# Patient Record
Sex: FEMALE | Race: BLACK OR AFRICAN AMERICAN | Employment: UNEMPLOYED | URBAN - METROPOLITAN AREA
[De-identification: names, ages, dates, MRNs, and addresses within clinical notes are randomized per-mention and may not be internally consistent; named-entity substitution may affect disease eponyms.]

---

## 2017-01-01 ENCOUNTER — HOSPITAL ENCOUNTER (OUTPATIENT)
Dept: OTHER | Age: 62
Discharge: HOME | End: 2017-01-31
Attending: OTOLARYNGOLOGY | Admitting: OTOLARYNGOLOGY

## 2017-01-12 ENCOUNTER — HOSPITAL ENCOUNTER (OUTPATIENT)
Dept: PHYSICAL THERAPY | Age: 62
Discharge: HOME | End: 2017-01-12
Admitting: OTOLARYNGOLOGY

## 2017-01-30 ENCOUNTER — HOSPITAL ENCOUNTER (OUTPATIENT)
Dept: PHYSICAL THERAPY | Age: 62
Discharge: HOME | End: 2017-01-30
Admitting: OTOLARYNGOLOGY

## 2017-02-01 ENCOUNTER — HOSPITAL ENCOUNTER (OUTPATIENT)
Dept: OTHER | Age: 62
Discharge: HOME | End: 2017-02-28
Attending: OTOLARYNGOLOGY | Admitting: OTOLARYNGOLOGY

## 2017-03-01 ENCOUNTER — HOSPITAL ENCOUNTER (OUTPATIENT)
Dept: OTHER | Age: 62
Discharge: HOME | End: 2017-03-03
Attending: OTOLARYNGOLOGY | Admitting: OTOLARYNGOLOGY

## 2017-06-27 DIAGNOSIS — I25.10 CORONARY ARTERY DISEASE INVOLVING NATIVE CORONARY ARTERY OF NATIVE HEART WITHOUT ANGINA PECTORIS: ICD-10-CM

## 2017-07-07 ENCOUNTER — OFFICE VISIT (OUTPATIENT)
Dept: CARDIOLOGY CLINIC | Age: 62
End: 2017-07-07

## 2017-07-07 VITALS
BODY MASS INDEX: 31.01 KG/M2 | HEIGHT: 67 IN | HEART RATE: 60 BPM | WEIGHT: 197.6 LBS | SYSTOLIC BLOOD PRESSURE: 110 MMHG | DIASTOLIC BLOOD PRESSURE: 68 MMHG

## 2017-07-07 DIAGNOSIS — I20.9 ISCHEMIC CHEST PAIN (HCC): Primary | ICD-10-CM

## 2017-07-07 PROCEDURE — 99214 OFFICE O/P EST MOD 30 MIN: CPT | Performed by: INTERNAL MEDICINE

## 2017-07-07 RX ORDER — METOPROLOL SUCCINATE 25 MG/1
25 TABLET, EXTENDED RELEASE ORAL DAILY
COMMUNITY
End: 2019-02-11 | Stop reason: SDUPTHER

## 2017-07-07 RX ORDER — LISINOPRIL 20 MG/1
20 TABLET ORAL DAILY
COMMUNITY
End: 2017-11-10 | Stop reason: SDUPTHER

## 2017-07-07 RX ORDER — HYDROMORPHONE HYDROCHLORIDE 2 MG/1
2 TABLET ORAL EVERY 6 HOURS PRN
COMMUNITY
End: 2018-02-13 | Stop reason: ALTCHOICE

## 2017-07-10 ENCOUNTER — TELEPHONE (OUTPATIENT)
Dept: CARDIOLOGY CLINIC | Age: 62
End: 2017-07-10

## 2017-07-11 ENCOUNTER — TELEPHONE (OUTPATIENT)
Dept: CARDIOLOGY CLINIC | Age: 62
End: 2017-07-11

## 2017-07-17 ENCOUNTER — PROCEDURE VISIT (OUTPATIENT)
Dept: CARDIOLOGY CLINIC | Age: 62
End: 2017-07-17

## 2017-07-17 DIAGNOSIS — I20.9 ISCHEMIC CHEST PAIN (HCC): Primary | ICD-10-CM

## 2017-07-17 LAB
LV EF: 55 %
LVEF MODALITY: NORMAL

## 2017-07-17 PROCEDURE — 93306 TTE W/DOPPLER COMPLETE: CPT | Performed by: INTERNAL MEDICINE

## 2017-07-20 ENCOUNTER — TELEPHONE (OUTPATIENT)
Dept: CARDIOLOGY CLINIC | Age: 62
End: 2017-07-20

## 2017-07-25 ENCOUNTER — TELEPHONE (OUTPATIENT)
Dept: CARDIOLOGY CLINIC | Age: 62
End: 2017-07-25

## 2017-10-06 ENCOUNTER — HOSPITAL ENCOUNTER (OUTPATIENT)
Dept: PHYSICAL THERAPY | Age: 62
Discharge: HOME | End: 2017-10-31
Attending: GENERAL PRACTICE | Admitting: GENERAL PRACTICE

## 2017-10-06 ASSESSMENT — PAIN DESCRIPTION - ORIENTATION: ORIENTATION: MID

## 2017-10-06 ASSESSMENT — PAIN DESCRIPTION - PAIN TYPE: TYPE: SURGICAL PAIN;CHRONIC PAIN

## 2017-10-06 ASSESSMENT — PAIN DESCRIPTION - ONSET: ONSET: ON-GOING

## 2017-10-06 ASSESSMENT — PAIN SCALES - GENERAL: PAINLEVEL_OUTOF10: 1

## 2017-10-06 ASSESSMENT — PAIN DESCRIPTION - PROGRESSION: CLINICAL_PROGRESSION: GRADUALLY IMPROVING

## 2017-10-06 ASSESSMENT — PAIN DESCRIPTION - DESCRIPTORS: DESCRIPTORS: ACHING

## 2017-10-06 ASSESSMENT — PAIN DESCRIPTION - FREQUENCY: FREQUENCY: INTERMITTENT

## 2017-10-06 ASSESSMENT — PAIN DESCRIPTION - LOCATION: LOCATION: ABDOMEN

## 2017-10-06 NOTE — FLOWSHEET NOTE
Outpatient Physical Therapy           [place]           [x] Phone: [phone]   Fax: [phone]  Tanesha Saldana           [] Phone: [phone]   Fax: [phone]     To: Referring Practitioner: Lynn Ford    From: Cyrus Noble, PT     Patient: Pérez Coffey       : 1955  Diagnosis: Diagnosis: Weak abdominal muscles   Treatment Diagnosis: Treatment Diagnosis: Decreased core strength   Date: 10/6/2017    Physical Therapy Certification/Re-Certification Form  Dear Dr. Ariana Magana  The following patient has been evaluated for physical therapy services and for therapy to continue, insurance requires physician review of the treatment plan initially and every 90 days. Please review the attached evaluation and/or summary of the patient's plan of care, and verify that you agree therapy should continue by signing the attached document and sending it back to our office. Assessment: Hudson Madera is a 60 y.o. female reportignt to OP PT with c/c of abdominal weaknss and pain following a surgery in March. Pt is noted to have redcued LE and impared ability to active core muscles. Pt can beenfit from PT working on core strengtheing to help improve pain and functional mobilty. Patient agrees with established plan of care and assisted in the development of their short term and long term goals. Patient had no adverse reaction with initial treatment and there are no barriers to learning. Demonstrates no mental or cognitive disorder. Plan of Care/Treatment to date:  [x] Therapeutic Exercise  [] Modalities:  [x] Therapeutic Activity     [] Ultrasound  [] Electrical Stimulation  [] Gait Training      [] Cervical Traction [] Lumbar Traction  [x] Neuromuscular Re-education    [] Cold/hotpack [] Iontophoresis   [x] Instruction in HEP      [] Vasopneumatic     [x] Manual Therapy               [] Aquatic Therapy       ? Frequency/Duration:  # Days per week: [] 1 day # Weeks: [] 1 week [] 5 weeks     [x] 2 days?    [] 2 weeks [x] 6 weeks     [] 3 days   [] 3 weeks [] 7 weeks     [] 4 days   [] 4 weeks [] 8 weeks         [] 9 weeks [] 10 weeks         [] 11 weeks [] 12 weeks    Rehab Potential/Progress: [] Excellent [x] Good [] Fair  [] Poor     Goals:      Long term goals  Time Frame for Long term goals : 6 Weeks  Long term goal 1: Pt will improve Le strength to 4+/5 throughout  Long term goal 2: Pt will report 50% improvement with ability to perform housework. Long term goal 3: Pt will improve Oswestry score to < 20%    G-Code Selection: (On Eval and every 10th visit or Discharge)  MEASURE  [x] Mobility: Walking and Moving Around     [x] Current ()   [x] Goal ()   [] DC ()  [] Changing/Maintaining Body Position     [] Current (6309)      [] Goal ()   [] DC ()  [] Carrying / Moving / Handling Objects     [] Current ()   [] Goal ()   [] DC ()  [] Self-Care     [] Current ()   [] Goal ()   [] DC ()  [] Other PT/OT primary DX     [] Current ()   [] Goal ()   [] DC ()    SEVERITY  CURRENT  GOAL  DISCHARGE   [] CH (0% Impaired, Indep.)  [] CI (1-19% Impaired, SBA-CGA)  [x] CJ (20-39% Impaired, MIN A)  [] CK  (40-59% Impairment, Mod A)  [] CL  (60-79% Impairment, Max A)  [] CM  (80-99% Impairment, Dep.)   [] CN  (100% Impairment, Tot Dep.) [] CH (0% Impaired, Indep.)  [x] CI (1-19% Impaired, SBA-CGA)  [] CJ (20-39% Impaired, MIN A)  [] CK  (40-59% Impairment, Mod A)  [] CL  (60-79% Impairment, Max A)  [] CM  (80-99% Impairment, Dep.)   [] CN  (100% Impairment, Tot Dep.)  [] CH (0% Impaired, Indep.)  [] CI (1-19% Impaired, SBA-CGA)  [] CJ (20-39% Impaired, MIN A)  [] CK  (40-59% Impairment, Mod A)  [] CL  (60-79% Impairment, Max A)  [] CM  (80-99% Impairment, Dep.)   [] CN  (100% Impairment, Tot Dep.)          Electronically signed by:  George Canela, PT, 10/6/2017, 9:46 AM        If you have any questions or concerns, please don't hesitate to call.   Thank you for your referral.      Physician Signature:________________________________Date:_________ TIME: _____  By signing above, therapists plan is approved by physician

## 2017-10-06 NOTE — FLOWSHEET NOTE
Outpatient Physical Therapy           [place]           [] Phone: [phone]   Fax: [phone]  [place]           [] Phone: [phone]   Fax: [phone]    Physical Therapy Daily Treatment Note  Date:  10/6/2017    Patient Name:  Ariana Zaldivar    :  1955  MRN: [id number]  Restrictions/Precautions: none, some anxiety about getting sore  Diagnosis: Weak abdominal muscles  Date of Surgery: March  Treatment Diagnosis:  Decreased core strength    Insurance/Certification information:  [facility]  Referring Physician:   Jose Luis Shahid  Next Doctor Visit:    Plan of care signed (Y/N):  n  Visit# / total visits:     Pain level: 0/10   Goals:          Long term goals  Time Frame for Long term goals : 6 Weeks  Long term goal 1: Pt will improve Le strength to 4+/5 throughout  Long term goal 2: Pt will report 50% improvement with ability to perform housework. Long term goal 3: Pt will improve Oswestry score to < 20%         Subjective: Any changes in Ambulatory Summary Sheet? Objective:          Exercises:  Exercise/Equipment Date Date Date Date            Nu-step 8', Lv 5               TABLE         PPT   X20, 3\"      TA   X10, 3\"      Danika Crea, 3\"      PPT+kegals X10, 3\"      GS         Bridges         HL CS   x15 GTB      SLR                     STANDING                                                                          Other Therapeutic Activities/Education: Patient received education on their current pathology and how their condition effects them with their functional activities. Patient understood discussion and questions were answered. Patient understands their activity limitations and understands rational for treatment progression.     Home Exercise Program:  HL CS, abdominal draw in, PPT, kegals, Rotations    Modality/intervention used:    [x] Therapeutic Exercise  [] Modalities:  [x] Therapeutic Activity     [] Ultrasound  [] Elec  Stim  [] Gait Training      [] Cervical

## 2017-10-11 ENCOUNTER — HOSPITAL ENCOUNTER (OUTPATIENT)
Dept: PHYSICAL THERAPY | Age: 62
Discharge: HOME | End: 2017-10-11
Admitting: GENERAL PRACTICE

## 2017-10-17 ENCOUNTER — HOSPITAL ENCOUNTER (OUTPATIENT)
Dept: PHYSICAL THERAPY | Age: 62
Discharge: HOME | End: 2017-10-17
Admitting: GENERAL PRACTICE

## 2017-10-17 NOTE — FLOWSHEET NOTE
Outpatient Physical Therapy           [place]           [x] Phone: [phone]   Fax: [phone]  Vanessa Wallace           [] Phone: [phone]   Fax: [phone]    Physical Therapy Daily Treatment Note  Date:  10/17/2017    Patient Name:  Cherie Avila    :  1955  MRN: [id number]  Restrictions/Precautions: none, some anxiety about getting sore  Diagnosis: Weak abdominal muscles  Date of Surgery: March  Treatment Diagnosis:  Decreased core strength    [facility]  Referring Physician:   Seng Coleman  Next Doctor Visit:    Plan of care signed (Y/N):  n  Visit# / total visits:   3/12  Pain level: 3/10   Goals:       Long term goals  Time Frame for Long term goals : 6 Weeks  Long term goal 1: Pt will improve Le strength to 4+/5 throughout  Long term goal 2: Pt will report 50% improvement with ability to perform housework. NOT MET  Long term goal 3: Pt will improve Oswestry score to < 20%         Subjective:  Pt states HEP is painful, left side feel weaker.        Any changes in Ambulatory Summary Sheet? no      Objective:      End session pain    Exercises:  Exercise/Equipment 10/17/17 Date            Nu-step      Rec bike   5'     TABLE        PPT   X10, 3\"     TA   --     PPT+kegals X10, 3\"     Bridges  W/ abd TB x15 GTB     HL CS   X15, GTB     SLR     --     Supine physioball roll    x30     Lumbar roll w/ physioball x20 B     Seated russian twist x15 B, 1kg     Hand heel rock x10     STANDING        Step ups   X15 B, 4\"     Lateral step ups   x15 B, 4\"     GS   X15, 3\"     Pull downs   X20, BTT     Resisted rotations   x20 B GTT                       Other Therapeutic Activities/Education: none    Home Exercise Program:  HL CS, abdominal draw in, PPT, kegals, Rotations    Modality/intervention used:    [x] Therapeutic Exercise  [] Modalities:  [x] Therapeutic Activity     [] Ultrasound  [] Elec  Stim  [] Gait Training      [] Cervical Traction [] Lumbar Traction  [x] Neuromuscular Re-education    [] Cold/hotpack [] Iontophoresis   [x] Instruction in HEP      [] Vasopneumatic     [x] Manual Therapy               [] Aquatic Therapy     Manual Treatments:  none    Modalities:  none    Communication with other providers:  poc sent to referring provider    Education provided to patient/caregiver:  Pt educated on poc, hep, pathology, if worsening symptoms to d/c that exercise. Adverse reactions to treatment:  none    Equipment provided:  GTB    Assessment:  Pt did well with today's session. Progressed several exercises which pt rossana well. Added pull down and resisted rotations to HEP. Pt verbalized and demonstrated understanding.      Time In / Time Out:  1303/1352                  Timed Code/Total Treatment Minutes: 49/49; 49 TE (3)    Patients Report of Tolerance:    [] Patient limited by fatigue        [] Patient limited by pain   [] Patient limited by other medical complications   [x] Other: rossana tx well    Prognosis:   [x] Good [] Fair  [] Poor    Plan:   [x] Continue per plan of care [] Alter current plan (see comments)  [] Plan of care initiated [] Hold pending MD visit [] Discharge    Plan for Next Session:    Core strengthening low intensity      Electronically signed by:  Adelfo Brizuela, PT 10/17/2017, 7:09 AM

## 2017-10-20 ENCOUNTER — HOSPITAL ENCOUNTER (OUTPATIENT)
Dept: PHYSICAL THERAPY | Age: 62
Discharge: HOME | End: 2017-10-20
Admitting: GENERAL PRACTICE

## 2017-10-20 NOTE — FLOWSHEET NOTE
Outpatient Physical Therapy           [place]           [x] Phone: [phone]   Fax: [phone]  Jayla Ponces           [] Phone: [phone]   Fax: [phone]    Physical Therapy Daily Treatment Note  Date:  10/20/2017    Patient Name:  Nikolas Funez    :  1955  MRN: [id number]  Restrictions/Precautions: none, some anxiety about getting sore  Diagnosis: Weak abdominal muscles  Date of Surgery: March  Treatment Diagnosis:  Decreased core strength    Insurance/Certification information:  [facility]  Referring Physician:   Rachel Singh Doctor Visit:    Plan of care signed (Y/N):  n  Visit# / total visits:   3/12  Pain level: 3/10   Goals:       Long term goals  Time Frame for Long term goals : 6 Weeks  Long term goal 1: Pt will improve Le strength to 4+/5 throughout  Long term goal 2: Pt will report 50% improvement with ability to perform housework.  NOT MET  Long term goal 3: Pt will improve Oswestry score to < 20%         Subjective:  Pt states was really sore Tuesday and wednesday      Any changes in Ambulatory Summary Sheet? no      Objective:      End session pain 4/10    Exercises:  Exercise/Equipment 10/17/17 10/20/17            Nu-step  Lv5, 6'    Rec bike   5' --    TABLE        TA   -- --    PPT+kegals X10, 3\" X20, 3\" W/ OH next   Bridges  W/ abd TB x15 GTB x15 GTB    HL CS   X15, GTB x15 GTB    SLR     -- --    Supine physioball roll    x30 x30    Lumbar roll w/ physioball x20 B x20 B    Seated russian twist x15 B, 1kg 10x2 6#    Hand heel rock x10 x10    STANDING        Step ups   X15 B, 4\" x15 B, 4\"    Lateral step ups   x15 B, 4\" x15 B, 4\"    GS   X15, 3\" X20, 3\"    Pull downs   X20, BTT x20 BTT    Resisted rotations   x20 B GTT x20 B GTT    Diagonal pull down     next         BALANCE        Wobble board   next                 Other Therapeutic Activities/Education: none    Home Exercise Program:  HL CS, abdominal draw in, PPT, kegals, Rotations    Modality/intervention used: [x] Therapeutic Exercise  [] Modalities:  [x] Therapeutic Activity     [] Ultrasound  [] Elec  Stim  [] Gait Training      [] Cervical Traction [] Lumbar Traction  [x] Neuromuscular Re-education    [] Cold/hotpack [] Iontophoresis   [x] Instruction in HEP      [] Vasopneumatic     [x] Manual Therapy               [] Aquatic Therapy     Manual Treatments:  none    Modalities:  none    Communication with other providers:  None    Education provided to patient/caregiver:  Pt educated on poc, hep, pathology, if worsening symptoms to d/c that exercise. Adverse reactions to treatment:  none    Equipment provided:  GTB    Assessment: Pt rossana session well. Mild increase in end session pain. Did not progress any exercises today due to increase pain following last session. Will try and progress exercises next visit.      Time In / Time Out:   1255/1338               Timed Code/Total Treatment Minutes: 43/43; 43 TE (3)    Patients Report of Tolerance:    [] Patient limited by fatigue        [] Patient limited by pain   [] Patient limited by other medical complications   [x] Other: rossana tx well    Prognosis:   [x] Good [] Fair  [] Poor    Plan:   [x] Continue per plan of care [] Alter current plan (see comments)  [] Plan of care initiated [] Hold pending MD visit [] Discharge    Plan for Next Session:    Core strengthening low intensity      Electronically signed by:  Adrian Diop PT 10/20/2017, 6:51 AM

## 2017-10-27 ENCOUNTER — HOSPITAL ENCOUNTER (OUTPATIENT)
Dept: PHYSICAL THERAPY | Age: 62
Discharge: HOME | End: 2017-10-27
Admitting: GENERAL PRACTICE

## 2017-10-27 NOTE — FLOWSHEET NOTE
Outpatient Physical Therapy           [place]           [x] Phone: [phone]   Fax: [phone]  [place]           [] Phone: [phone]   Fax: [phone]    Physical Therapy Daily Treatment Note  Date:  10/27/2017    Patient Name:  Krysten Alfaro    :  1955  MRN: [id number]  Restrictions/Precautions: none, some anxiety about getting sore  Diagnosis: Weak abdominal muscles  Date of Surgery: March  Treatment Diagnosis:  Decreased core strength    Insurance/Certification information:  [facility]  Referring Physician:   Yohana Chicas  Next Doctor Visit:    Plan of care signed (Y/N):  y  Visit# / total visits:     Pain level: 2/10   Goals:       Long term goals  Time Frame for Long term goals : 6 Weeks  Long term goal 1: Pt will improve Le strength to 4+/5 throughout  Long term goal 2: Pt will report 50% improvement with ability to perform housework. NOT MET  Long term goal 3: Pt will improve Oswestry score to < 20%         Subjective:  Pt stated that her pain was about 2/10 today. Pt stated that she started taking 2 different meds and that she is feeling much better now. Any changes in Ambulatory Summary Sheet? Yes, taking Protonix and Zofram now. Pt is unsure of dosage and will let us know next visit.        Objective:  Needed cues to keep shoulders down during resisted rotation    End session pain 4/10    Exercises:  Exercise/Equipment 10/17/17 10/20/17 10/27/17           Nu-step  Lv5, 6' L-5 x 5'    Rec bike   5' --    TABLE        TA   -- --    PPT+kegals X10, 3\" X20, 3\" 1 KG ball x 15 w/ PPT/ kegals   Bridges  W/ abd TB x15 GTB x15 GTB GTB  X 15   HL CS   X15, GTB x15 GTB GTB x 20 B   SLR     -- --    Supine physioball roll    x30 x30 RSB x 20   Lumbar roll w/ physioball x20 B x20 B RSB 20x B   Seated russian twist x15 B, 1kg 10x2 6# 6# CB x 20 B   Hand heel rock x10 x10    STANDING        Step ups   X15 B, 4\" x15 B, 4\" 6\" x15 B   Lateral step ups   x15 B, 4\" x15 B, 4\" 6\" x 15 B   GS X15, 3\" X20, 3\" 20 x 3\"    Pull downs   X20, BTT x20 BTT BTT x 20   Resisted rotations   x20 B GTT x20 B GTT GTT x 20 B   Diagonal pull down     GTT x 15 B   (1 handle)         BALANCE        Wobble board   30\" x 2 ea dir                 Other Therapeutic Activities/Education: none    Home Exercise Program:  HL CS, abdominal draw in, PPT, kegals, Rotations    Modality/intervention used:    [x] Therapeutic Exercise  [] Modalities:  [x] Therapeutic Activity     [] Ultrasound  [] Elec  Stim  [] Gait Training      [] Cervical Traction [] Lumbar Traction  [x] Neuromuscular Re-education    [] Cold/hotpack [] Iontophoresis   [x] Instruction in HEP      [] Vasopneumatic     [x] Manual Therapy               [] Aquatic Therapy     Manual Treatments:  none    Modalities:  none    Communication with other providers:  None    Education provided to patient/caregiver:  Pt educated on poc, hep, pathology, if worsening symptoms to d/c that exercise. Adverse reactions to treatment:  Some nausea during RSB rolls    Equipment provided:      Assessment: Pt tolerated treatment fairly well. Pt was able to advance activity in gym today. Pt rated pain at 4/10 after treatment.      Time In / Time Out:  1009/   1052            Timed Code/Total Treatment Minutes:  43'/43'  TE (37')     Patients Report of Tolerance:    [] Patient limited by fatigue        [] Patient limited by pain   [] Patient limited by other medical complications   [x] Other: rossana tx well    Prognosis:   [x] Good [] Fair  [] Poor    Plan:   [x] Continue per plan of care [] Alter current plan (see comments)  [] Plan of care initiated [] Hold pending MD visit [] Discharge    Plan for Next Session:    Core strengthening low intensity      Electronically signed by:  Aman Jarrell PTA 10/27/2017, 9:45 AM     10/27/2017,4:46 PM

## 2017-10-31 ENCOUNTER — HOSPITAL ENCOUNTER (OUTPATIENT)
Dept: PHYSICAL THERAPY | Age: 62
Discharge: HOME | End: 2017-10-31
Admitting: GENERAL PRACTICE

## 2017-10-31 NOTE — FLOWSHEET NOTE
Outpatient Physical Therapy           [place]           [x] Phone: [phone]   Fax: [phone]  Cedric Beltran           [] Phone: [phone]   Fax: [phone]    Physical Therapy Daily Treatment Note  Date:  10/31/2017    Patient Name:  Emili Arrieta    :  [birth year]  MRN: [id number]  Restrictions/Precautions: none, some anxiety about getting sore  Diagnosis: Weak abdominal muscles  Date of Surgery: 2017  Treatment Diagnosis:  Decreased core strength    [facility]Cert[facility]  Referring Physician:   Dora Costa  Next Doctor Visit:  January  Plan of care signed (Y/N):  y  Visit# / total visits:     Pain level: 3-410   Goals:       Long term goals  Time Frame for Long term goals : 6 Weeks  Long term goal 1: Pt will improve Le strength to 4+/5 throughout NOT MET  Long term goal 2: Pt will report 50% improvement with ability to perform housework. NOT MET  Long term goal 3: Pt will improve Oswestry score to < 20%         Subjective:  Pt states still having abdominal pain Still feel like left leg is really weak. Any changes in Ambulatory Summary Sheet? Yes, taking Protonix and Zofram now. Pt is unsure of dosage and will let us know next visit.        Objective:   Some shaking  left quad with squatting activities   End session pain 3-4/10    Exercises:  Exercise/Equipment 10/27/17 10/31/17            Nu-step L-5 x 5'  6', Lv 6    Rec bike    --    TABLE        TA    -- Have pt shcedule 2x w/k   PPT+kegals 1 KG ball x 15 w/ PPT/ kegals --    Bridges  W/ abd TB GTB  X 15 x15 GTB    SL CS   GTB x 20 B x10 B GTB    SLR      --    Supine physioball roll    RSB x 20 x15    Lumbar roll w/ physioball RSB 20x B x10 B    Seated russian twist 6# CB x 20 B x20 B, 6#    Hand heel rock  --    STANDING        Step ups   6\" x15 B --    Lateral step ups   6\" x 15 B --    GS   20 x 3\"  --    Pull downs   BTT x 20 x20 BTT    Diagonal pull down    x15 B BTT    Resisted rotations  FT GTT x 20 B X20, 1 plt    Resisted side steps FT  x7 B, 1 plt    Shuttle Squats  DL  SL    10x2, 3C  5x3, L    HS curl machine  x15 B, 15#    Squat with ball push out  10x2, 2 kg    BALANCE        Wobble board 30\" x 2 ea dir --                  Other Therapeutic Activities/Education: none    Home Exercise Program:  SL CS, abdominal draw in, PPT, kegals, Rotations    Modality/intervention used:    [x] Therapeutic Exercise  [] Modalities:  [x] Therapeutic Activity     [] Ultrasound  [] Elec  Stim  [] Gait Training      [] Cervical Traction [] Lumbar Traction  [x] Neuromuscular Re-education    [] Cold/hotpack [] Iontophoresis   [x] Instruction in HEP      [] Vasopneumatic     [x] Manual Therapy               [] Aquatic Therapy     Manual Treatments:  none    Modalities:  none    Communication with other providers:  None    Education provided to patient/caregiver:  Pt educated on poc, hep, pathology, if worsening symptoms to d/c that exercise. Adverse reactions to treatment:  Some nausea during RSB rolls    Equipment provided:      Assessment: Pt did well with emily's session. Increased resistance exercises for lower extremities as pt still feels weaker on left side. Shaking due to weakness and decreased motor control. Will continue to benefit working on progressing on core strength and LE strengthening.      Time In / Time Out:       1100/1157      Timed Code/Total Treatment Minutes:  37/10; 57 TE (4)    Patients Report of Tolerance:    [] Patient limited by fatigue        [] Patient limited by pain   [] Patient limited by other medical complications   [x] Other: rossana tx well    Prognosis:   [x] Good [] Fair  [] Poor    Plan:   [x] Continue per plan of care [] Alter current plan (see comments)  [] Plan of care initiated [] Hold pending MD visit [] Discharge    Plan for Next Session:    Core and LE strengthening low intensity      Electronically signed by:  Teofilo Acevedo PT 10/31/2017, 7:20 AM

## 2017-11-01 ENCOUNTER — HOSPITAL ENCOUNTER (OUTPATIENT)
Dept: OTHER | Age: 62
Discharge: HOME | End: 2017-11-30
Attending: GENERAL PRACTICE | Admitting: GENERAL PRACTICE

## 2017-11-08 ENCOUNTER — HOSPITAL ENCOUNTER (OUTPATIENT)
Dept: PHYSICAL THERAPY | Age: 62
Discharge: HOME | End: 2017-11-08
Admitting: GENERAL PRACTICE

## 2017-11-08 NOTE — FLOWSHEET NOTE
Outpatient Physical Therapy           [place]           [x] Phone: [phone]   Fax: [phone]  [place]           [] Phone: [phone]   Fax: [phone]    Physical Therapy Daily Treatment Note  Date:  2017    Patient Name:  Holly Velasquez    :    MRN: [id number]  Restrictions/Precautions: none, some anxiety about getting sore  Diagnosis: Weak abdominal muscles  Date of Surgery: 2017  Treatment Diagnosis:  Decreased core strength    Insurance/Certification information:  [facility]  Referring Physician:   Asif James  Next Doctor Visit:  January  Plan of care signed (Y/N):  y  Visit# / total visits:     Pain level: 0/10   Goals:       Long term goals  Time Frame for Long term goals : 6 Weeks  Long term goal 1: Pt will improve Le strength to 4+/5 throughout NOT MET  Long term goal 2: Pt will report 50% improvement with ability to perform housework. NOT MET  Long term goal 3: Pt will improve Oswestry score to < 20%         Subjective:   Pt stated that her pain was 0/10. Pt stated that stated that she has been sleeping the last few days because she caught a stomach bug. Pt stated that she feels weak, though. Pt stated that she has not done any of her exercises since last Friday.      Any changes in Ambulatory Summary Sheet? no    Objective: LOB w/ step ups leading with L LE, but able to recover I.   End session pain   5/10    Exercises:  Exercise/Equipment 10/27/17 10/31/17 11/3/17 11/8/17            Nu-step L-5 x 5'  6', Lv 6 L-6 x 5'  X 5'   Rec bike    --     TABLE         TA    --     PPT+kegals 1 KG ball x 15 w/ PPT/ kegals --     Bridges  W/ abd TB GTB  X 15 x15 GTB GTB 10x2 GTB 10x2   SL CS   GTB x 20 B x10 B GTB GTB 15 B GTB x 15 ea    SLR      --     Supine physioball roll    RSB x 20 x15  GSB X 15 GSB x 15   Lumbar roll w/ physioball RSB 20x B x10 B X 10 B  GSB 10x2 ea side   Seated russian twist 6# CB x 20 B x20 B, 6# 6# CB x 20 B 8# x 20   Hand heel rock  -- STANDING         Step ups   6\" x15 B -- 6\" x 15 B 6\" 15 B   Lateral step ups   6\" x 15 B -- 6\"  X 15 B 6\"  X 15 B   GS   20 x 3\"  --     Pull downs   BTT x 20 x20 BTT BTT x 20 BTT  X 20   Diagonal pull down    x15 B BTT BTT 20 x ea side --   Resisted rotations  FT GTT x 20 B X20, 1 plt 1 plt x 20 ea side --   Resisted side steps FT  x7 B, 1 plt 2 plts( belt) x 10 ea side --   Shuttle Squats  DL  SL    10x2, 3C  5x3, L   3C 10x2  2C 5x3 L only   3C 10x2  2C L/R x 15   HS curl machine  x15 B, 15# 15# U x 15 ea LE 15# U x 15 R  10# U x 15 L   Squat with ball push out  10x2, 2 kg 2 kg 10x2 --   BALANCE         Wobble board 30\" x 2 ea dir --                     Other Therapeutic Activities/Education: none    Home Exercise Program:  SL CS, abdominal draw in, PPT, kegals, Rotations    Modality/intervention used:    [x] Therapeutic Exercise  [] Modalities:  [x] Therapeutic Activity     [] Ultrasound  [] Elec  Stim  [] Gait Training      [] Cervical Traction [] Lumbar Traction  [x] Neuromuscular Re-education    [] Cold/hotpack [] Iontophoresis   [x] Instruction in HEP      [] Vasopneumatic     [x] Manual Therapy               [] Aquatic Therapy     Manual Treatments:  none    Modalities:  none    Communication with other providers:  None    Education provided to patient/caregiver:  Pt educated on poc, hep, pathology, if worsening symptoms to d/c that exercise. Adverse reactions to treatment:     Equipment provided:      Assessment: Pt tolerated treatment fair today. Treatment was modified due to patient being sick earlier in the week and also because pt is scheduled to return to therapy in the morning. Pt rated pain at 5/10 after treatment. Pt's reported that her pain steadily increased after each exercise today. Pt will continue to benefit from more core strengthening and LE especially L strengthening.      Time In / Time Out:      1030/1115    Timed Code/Total Treatment Minutes: 45'/45' 3 TE (39')     Patients Report

## 2017-11-09 ENCOUNTER — HOSPITAL ENCOUNTER (OUTPATIENT)
Dept: PHYSICAL THERAPY | Age: 62
Discharge: HOME | End: 2017-11-09
Admitting: GENERAL PRACTICE

## 2017-11-09 NOTE — FLOWSHEET NOTE
downs   x20 BTT BTT x 20 BTT  X 20 BTT x 20   Diagonal pull down   x15 B BTT BTT 20 x ea side -- BTT 20 x ea side   Resisted rotations  FT X20, 1 plt 1 plt x 20 ea side -- 2 plts x 20 ea side   Resisted side steps FT x7 B, 1 plt 2 plts( belt) x 10 ea side -- 2 plts( belt) x 10 ea side   Shuttle Squats  DL  SL   10x2, 3C  5x3, L   3C 10x2  2C 5x3 L only   3C 10x2  2C L/R x 15    HS curl machine x15 B, 15# 15# U x 15 ea LE 15# U x 15 R  10# U x 15 L 15# U x 15 R  10# U x 15 L   Squat with ball push out 10x2, 2 kg 2 kg 10x2 -- 2KG 10x2   BALANCE         Wobble board --                      Other Therapeutic Activities/Education: none    Home Exercise Program:  SL CS, abdominal draw in, PPT, kegals, Rotations    Modality/intervention used:    [x] Therapeutic Exercise  [] Modalities:  [x] Therapeutic Activity     [] Ultrasound  [] Elec  Stim  [] Gait Training      [] Cervical Traction [] Lumbar Traction  [x] Neuromuscular Re-education    [] Cold/hotpack [] Iontophoresis   [x] Instruction in HEP      [] Vasopneumatic     [x] Manual Therapy               [] Aquatic Therapy     Manual Treatments:  none    Modalities:  none    Communication with other providers:  None    Education provided to patient/caregiver:  Pt educated on poc, hep, pathology, if worsening symptoms to d/c that exercise. Adverse reactions to treatment:     Equipment provided:      Assessment: Pt tolerated treatment fair today. Treatment was modified due to patient having two appointments on consecutive days. Pt rated pain at 6/10 after treatment. Pt's reported that her pain steadily increased after each exercise today. Pt will continue to benefit from more core strengthening and LE especially L strengthening.      Time In / Time Out:   1026/1114      Timed Code/Total Treatment Minutes: 48'/48' 2 TE (45')1 ( 13')      Patients Report of Tolerance:    [] Patient limited by fatigue        [] Patient limited by pain   [] Patient limited by other medical

## 2017-11-10 RX ORDER — LISINOPRIL 20 MG/1
20 TABLET ORAL DAILY
Qty: 30 TABLET | Refills: 6 | Status: SHIPPED | OUTPATIENT
Start: 2017-11-10 | End: 2019-06-28 | Stop reason: SDUPTHER

## 2017-11-10 RX ORDER — SIMVASTATIN 20 MG
20 TABLET ORAL NIGHTLY
Qty: 30 TABLET | Refills: 6 | Status: SHIPPED | OUTPATIENT
Start: 2017-11-10 | End: 2021-02-22 | Stop reason: SDUPTHER

## 2017-12-01 ENCOUNTER — HOSPITAL ENCOUNTER (OUTPATIENT)
Dept: OTHER | Age: 62
Discharge: HOME | End: 2017-12-31
Attending: GENERAL PRACTICE | Admitting: GENERAL PRACTICE

## 2018-01-01 ENCOUNTER — HOSPITAL ENCOUNTER (OUTPATIENT)
Dept: OTHER | Age: 63
Discharge: HOME | End: 2018-01-22
Attending: GENERAL PRACTICE | Admitting: GENERAL PRACTICE

## 2018-03-19 ENCOUNTER — OFFICE VISIT (OUTPATIENT)
Dept: CARDIOLOGY CLINIC | Age: 63
End: 2018-03-19

## 2018-03-19 VITALS
SYSTOLIC BLOOD PRESSURE: 132 MMHG | HEART RATE: 70 BPM | HEIGHT: 67 IN | WEIGHT: 186.2 LBS | BODY MASS INDEX: 29.22 KG/M2 | DIASTOLIC BLOOD PRESSURE: 62 MMHG

## 2018-03-19 DIAGNOSIS — R07.9 CHEST PAIN, UNSPECIFIED TYPE: ICD-10-CM

## 2018-03-19 DIAGNOSIS — I25.10 CORONARY ARTERY DISEASE INVOLVING NATIVE CORONARY ARTERY OF NATIVE HEART WITHOUT ANGINA PECTORIS: Primary | ICD-10-CM

## 2018-03-19 PROCEDURE — G8419 CALC BMI OUT NRM PARAM NOF/U: HCPCS | Performed by: INTERNAL MEDICINE

## 2018-03-19 PROCEDURE — 3017F COLORECTAL CA SCREEN DOC REV: CPT | Performed by: INTERNAL MEDICINE

## 2018-03-19 PROCEDURE — 3014F SCREEN MAMMO DOC REV: CPT | Performed by: INTERNAL MEDICINE

## 2018-03-19 PROCEDURE — 93000 ELECTROCARDIOGRAM COMPLETE: CPT | Performed by: INTERNAL MEDICINE

## 2018-03-19 PROCEDURE — G8427 DOCREV CUR MEDS BY ELIG CLIN: HCPCS | Performed by: INTERNAL MEDICINE

## 2018-03-19 PROCEDURE — G8599 NO ASA/ANTIPLAT THER USE RNG: HCPCS | Performed by: INTERNAL MEDICINE

## 2018-03-19 PROCEDURE — 4004F PT TOBACCO SCREEN RCVD TLK: CPT | Performed by: INTERNAL MEDICINE

## 2018-03-19 PROCEDURE — G8484 FLU IMMUNIZE NO ADMIN: HCPCS | Performed by: INTERNAL MEDICINE

## 2018-03-19 PROCEDURE — 99214 OFFICE O/P EST MOD 30 MIN: CPT | Performed by: INTERNAL MEDICINE

## 2018-03-19 RX ORDER — TRAMADOL HYDROCHLORIDE 50 MG/1
50 TABLET ORAL EVERY 6 HOURS PRN
COMMUNITY
End: 2019-06-28

## 2018-03-19 NOTE — PROGRESS NOTES
metoprolol succinate (TOPROL XL) 25 MG extended release tablet Take 25 mg by mouth daily      senna-docusate (SENOKOT S) 8.6-50 MG per tablet Take 2 tablets by mouth daily 30 tablet 2    Acetaminophen 650 MG TABS Take 650 mg by mouth every 4 hours as needed 120 tablet 3    dicyclomine (BENTYL) 20 MG tablet Take 20 mg by mouth every 6 hours       ranitidine (ZANTAC) 300 MG tablet 300 mg daily as needed   5    nitroGLYCERIN (NITROSTAT) 0.4 MG SL tablet Place 1 tablet under the tongue every 5 minutes as needed for Chest pain 25 tablet 3    loratadine (CLARITIN) 10 MG tablet Take 10 mg by mouth daily.  aspirin EC 81 MG EC tablet Take 1 tablet by mouth daily. 30 tablet 3    triamterene-hydrochlorothiazide (MAXZIDE) 75-50 MG per tablet Take 1 tablet by mouth as needed       clonazePAM (KLONOPIN) 1 MG tablet Take 1 mg by mouth 3 times daily as needed. No current facility-administered medications for this visit. Allergies: Pregabalin; Hydromorphone hcl; Codeine; Aspirin; Gabapentin; Ibuprofen; and Iodine  Past Medical History:   Diagnosis Date    Anxiety     CAD (coronary artery disease)     Depression     GERD (gastroesophageal reflux disease)     H/O cardiovascular stress test     7/26/12-No scintigraphic evidence of inducible myocardial ischemia. Global Left ventricular sytolic function normal. Rest EF 64%. No ECG changes. Unremarkable pharmacological stress test. Normal Myocardial Perfusion Study. 3/30/11- EF70% Normal Myocardial Perfusion study. 2/18/2010 EF =>55%;1/2009 EF 65%, 1/2008, 1/2007, 5/2006, 2/2006 EF70%    H/O Doppler ultrasound 3/3/2008     Carotid Report- No Significant atherosclerotic plaque noted in the right internal carotid artery. The Doppler flow velocities w/in FREDERIC are within normal limits. There is intimal thinckening but no significant atherosclerotic plaque noted in LICA. The Dopple flow velocities w/in LICA are Within Normal Limits.     H/O echocardiogram 7/26/12- EF=>55%. Left ventricular systolic function is [id number] EF =>55%, 12/2010 EF55%;  2/14/2008    H/O echocardiogram 07/17/2017    EF >55%    H/O percutaneous left heart catheterization 3/11/14    EF 55% Left anterior descending artery has patent stent, proximal LAD has eccentric lesion of 30-40% unchanged from last time, The right coronary artery is a dominant vessel with abberant take off, has 40-50% mid RCA stenosis, proximal stent is patent. No evidence of hemodynamically significant coronary artery disease, I have tried to do FFR of RCA, however, because of  aberrant take off, could no    Hx of cardiovascular stress test 5/14/2015    cardiolite-normal,EF63%    Hx of echocardiogram 3/15/16    EF 55%, normal LV, trivial MR & TR, mild aortic insufficiency.  Hyperlipidemia     Orthostatic hypotension     Other activity(E029.9) 9/2/2008    48 HR Holter Monitor- Predominat rhythm is sinus rhythm. No significant ectopy noted.  Post PTCA 08/23/2013    stent LAD    Post PTCA 08/13/2012    RCA 99% reduced to 0 w/ PTCA and stent w. 2.75 X 23 Xience. Left main coronary artery has no signif. dis. LAD artery has mild disease. CX artery has no signif. disease. EF 55%     Past Surgical History:   Procedure Laterality Date    CARDIAC CATHETERIZATION      4/24/2006-Left heart cath-    CARDIAC CATHETERIZATION  03/11/2014    EF 55% Left anterior descending artery has patent stent, proximal LAD has eccentric lesion of 30-40% unchanged from last time, The right coronary artery is a dominant vessel with abberant take off, has 40-50% mid RCA stenosis, proximal stent is patent.  No evidence of hemodynamically significant coronary artery disease, I have tried to do FFR of RCA, however, because of  aberrant take off, could no    CORONARY ANGIOPLASTY WITH STENT PLACEMENT  8/23/13 8/23/13 LAD 7/2012; 4/25/2006- PTCA w/ stent-> RCA    EYE SURGERY      HERNIA REPAIR  03/08/2017    HYSTERECTOMY      TONSILLECTOMY AND ADENOIDECTOMY      ULNAR TUNNEL RELEASE  11/2006    Ulnar nerve tranfer Left elbow    WRIST SURGERY  7/2008    Left wrist     Family History   Problem Relation Age of Onset    Diabetes Sister     Heart Disease Sister     High Blood Pressure Sister     Diabetes Brother     Heart Disease Brother     High Blood Pressure Brother     Cancer Maternal Aunt     Cancer Paternal Aunt     Diabetes Brother     High Blood Pressure Brother      Social History   Substance Use Topics    Smoking status: Current Some Day Smoker     Packs/day: 0.25     Types: Cigarettes     Start date: 6/1/2015    Smokeless tobacco: Never Used    Alcohol use No      [unfilled]  Review of Systems:   · Constitutional: No Fever or Weight Loss   · Eyes: No Decreased Vision  · ENT: No Headaches, Hearing Loss or Vertigo  · Cardiovascular: No chest pain, dyspnea on exertion, palpitations or loss of consciousness  · Respiratory: No cough or wheezing    · Gastrointestinal: No abdominal pain, appetite loss, blood in stools, constipation, diarrhea or heartburn  · Genitourinary: No dysuria, trouble voiding, or hematuria  · Musculoskeletal:  No gait disturbance, weakness or joint complaints  · Integumentary: No rash or pruritis  · Neurological: No TIA or stroke symptoms  · Psychiatric: No anxiety or depression  · Endocrine: No malaise, fatigue or temperature intolerance  · Hematologic/Lymphatic: No bleeding problems, blood clots or swollen lymph nodes  · Allergic/Immunologic: No nasal congestion or hives  All systems negative except as marked. Objective:  /62   Pulse 70   Ht 5' 7\" (1.702 m)   Wt 186 lb 3.2 oz (84.5 kg)   BMI 29.16 kg/m²   Wt Readings from Last 3 Encounters:   03/19/18 186 lb 3.2 oz (84.5 kg)   03/13/18 188 lb 3.2 oz (85.4 kg)   02/13/18 191 lb (86.6 kg)     Body mass index is 29.16 kg/m².   GENERAL - Alert, oriented, pleasant, in no apparent distress,normal grooming  HEENT  pupils are reactive to light has a past medical history of Anxiety; CAD (coronary artery disease); Depression; GERD (gastroesophageal reflux disease); H/O cardiovascular stress test; H/O Doppler ultrasound; H/O echocardiogram; H/O echocardiogram; H/O percutaneous left heart catheterization; Hx of cardiovascular stress test; Hx of echocardiogram; Hyperlipidemia; Orthostatic hypotension; Other activity(E029.9); Post PTCA; and Post PTCA. and presents with     Plan:  1. CAD: Continue aspirin and change brilinita to 60 mg po bid,continue lopressor and zocor. 2. Vague chest discomfort: stress test ordered  3. Dyslipidemia: continue statins  4. HTN: stable, continue lopressor and lisinopril medicatons  1. Health maintenance: exerise and dietHealth maintenance: exerise and diet  All labs, medications and tests reviewed, continue all other medications of all above medical condition listed as is.     [unfilled]

## 2018-04-02 ENCOUNTER — TELEPHONE (OUTPATIENT)
Dept: CARDIOLOGY CLINIC | Age: 63
End: 2018-04-02

## 2018-04-06 ENCOUNTER — TELEPHONE (OUTPATIENT)
Dept: CARDIOLOGY CLINIC | Age: 63
End: 2018-04-06

## 2018-06-07 ENCOUNTER — TELEPHONE (OUTPATIENT)
Dept: CARDIOLOGY CLINIC | Age: 63
End: 2018-06-07

## 2018-06-21 DIAGNOSIS — R07.9 CHEST PAIN, UNSPECIFIED TYPE: Primary | ICD-10-CM

## 2018-06-22 ENCOUNTER — PROCEDURE VISIT (OUTPATIENT)
Dept: CARDIOLOGY CLINIC | Age: 63
End: 2018-06-22

## 2018-06-22 DIAGNOSIS — R07.9 CHEST PAIN, UNSPECIFIED TYPE: Primary | ICD-10-CM

## 2018-06-22 PROCEDURE — 93015 CV STRESS TEST SUPVJ I&R: CPT | Performed by: INTERNAL MEDICINE

## 2018-07-09 ENCOUNTER — TELEPHONE (OUTPATIENT)
Dept: CARDIOLOGY CLINIC | Age: 63
End: 2018-07-09

## 2018-08-06 ENCOUNTER — OFFICE VISIT (OUTPATIENT)
Dept: CARDIOLOGY CLINIC | Age: 63
End: 2018-08-06

## 2018-08-06 VITALS
DIASTOLIC BLOOD PRESSURE: 64 MMHG | BODY MASS INDEX: 29.7 KG/M2 | WEIGHT: 189.2 LBS | SYSTOLIC BLOOD PRESSURE: 136 MMHG | HEIGHT: 67 IN | HEART RATE: 60 BPM

## 2018-08-06 DIAGNOSIS — I10 ESSENTIAL HYPERTENSION: Primary | ICD-10-CM

## 2018-08-06 PROCEDURE — G8599 NO ASA/ANTIPLAT THER USE RNG: HCPCS | Performed by: INTERNAL MEDICINE

## 2018-08-06 PROCEDURE — G8419 CALC BMI OUT NRM PARAM NOF/U: HCPCS | Performed by: INTERNAL MEDICINE

## 2018-08-06 PROCEDURE — 4004F PT TOBACCO SCREEN RCVD TLK: CPT | Performed by: INTERNAL MEDICINE

## 2018-08-06 PROCEDURE — G8427 DOCREV CUR MEDS BY ELIG CLIN: HCPCS | Performed by: INTERNAL MEDICINE

## 2018-08-06 PROCEDURE — 99214 OFFICE O/P EST MOD 30 MIN: CPT | Performed by: INTERNAL MEDICINE

## 2018-08-06 PROCEDURE — 3017F COLORECTAL CA SCREEN DOC REV: CPT | Performed by: INTERNAL MEDICINE

## 2018-08-06 RX ORDER — NITROGLYCERIN 0.4 MG/1
0.4 TABLET SUBLINGUAL EVERY 5 MIN PRN
Qty: 25 TABLET | Refills: 3 | Status: SHIPPED | OUTPATIENT
Start: 2018-08-06

## 2018-08-06 RX ORDER — ONDANSETRON 4 MG/1
TABLET, ORALLY DISINTEGRATING ORAL
Refills: 2 | COMMUNITY
Start: 2018-05-03 | End: 2021-02-22

## 2018-08-06 NOTE — PROGRESS NOTES
Denny Santa MD        OFFICE  FOLLOWUP NOTE    Chief complaints: patient is here for management of CAD, HTN,vertigo, chest pain  Subjective: patient feels better, no chest pain, no shortness of breath, no dizziness, no palpitations    HPI Earl Talley is a 60 y. o.year old who  has a past medical history of Anxiety; CAD (coronary artery disease); Chest pain; Depression; GERD (gastroesophageal reflux disease); H/O cardiovascular stress test; H/O Doppler ultrasound; H/O echocardiogram; H/O echocardiogram; H/O exercise stress test; H/O percutaneous left heart catheterization; Hx of cardiovascular stress test; Hx of echocardiogram; Hyperlipidemia; Orthostatic hypotension; Other activity(E029.9); Post PTCA; and Post PTCA. and presents for management of CAD, HTN,vertigo, chest pain, which are well controlled      Current Outpatient Prescriptions   Medication Sig Dispense Refill    ondansetron (ZOFRAN-ODT) 4 MG disintegrating tablet TAKE 1 TABLET SUBLINGUALLY EVERY 4 TO 6 HOURS AS NEEDED.  2    traMADol (ULTRAM) 50 MG tablet Take 50 mg by mouth every 6 hours as needed for Pain.       meclizine (ANTIVERT) 12.5 MG tablet TAKE 1 TABLET BY MOUTH EVERY 6 HOURS AS NEEDED FOR DIZZINESS  2    VENTOLIN  (90 Base) MCG/ACT inhaler INHALE 2 PUFFS INTO THE LUNGS DAILY AS NEEDED  3    pantoprazole (PROTONIX) 40 MG tablet TAKE 1 TABLET BY MOUTH EVERY DAY  2    simvastatin (ZOCOR) 20 MG tablet Take 1 tablet by mouth nightly 30 tablet 6    ticagrelor (BRILINTA) 60 MG TABS tablet Take 1 tablet by mouth 2 times daily 60 tablet 6    lisinopril (PRINIVIL;ZESTRIL) 20 MG tablet Take 1 tablet by mouth daily 30 tablet 6    citalopram (CELEXA) 40 MG tablet       metoprolol succinate (TOPROL XL) 25 MG extended release tablet Take 25 mg by mouth daily      senna-docusate (SENOKOT S) 8.6-50 MG per tablet Take 2 tablets by mouth daily 30 tablet 2    dicyclomine (BENTYL) 20 MG tablet Take 20 mg by mouth every 6 hours bruits, no apical -carotid delay  Respiratory:  Normal breath sounds, No respiratory distress, No wheezing, No chest tenderness. ,no use of accessory muscles, diaphragm movement is normal  Cardiovascular: (PMI) apex non displaced,no lifts no thrills, no s3,no s4, Normal heart rate, Normal rhythm, No murmurs, No rubs, No gallops. Carotid arteries pulse and amplitude are normal no bruit, no abdominal bruit noted ( normal abdominal aorta ausculation), femoral arteries pulse and amplitude are normal no bruit, pedal pulses are normal  Femoral pulses have normal amplitude, no bruits   Extremities - No cyanosis, clubbing, or significant edema, no varicose veins    Abdomen  No masses, tenderness, or organomegaly, no hepato-splenomegally, no bruits  Musculoskeletal  No significant edema, no kyphosis or scoliosis, no deformity in any extremity noted, muscle strength and tone are normal  Skin: no ulcer,no scar,no stasis dermatitis   Neurologic  alert oriented times 3,Cranial nerves II through XII are grossly intact. There were no gross focal neurologic abnormalities. All sensory and motor nerves examined and were normal  Psychiatric: normal mood and affect    Lab Results   Component Value Date    TROPONINI <0.006 03/10/2014     BNP:    Lab Results   Component Value Date    BNP 33 03/09/2014     PT/INR:  No results found for: PTINR  No results found for: LABA1C  Lab Results   Component Value Date    CHOL 187 04/17/2013    TRIG 79 04/17/2013    HDL 79 (A) 04/17/2013    LDLCALC 92 04/17/2013     Lab Results   Component Value Date    ALT 8 (L) 06/17/2016    AST 13 (L) 06/17/2016     TSH:  No results found for: TSH    Impression:  Paulino Kat is a 60 y. o.year old who  has a past medical history of Anxiety; CAD (coronary artery disease);  Chest pain; Depression; GERD (gastroesophageal reflux disease); H/O cardiovascular stress test; H/O Doppler ultrasound; H/O echocardiogram; H/O echocardiogram; H/O exercise stress test; H/O

## 2018-10-17 PROBLEM — R10.84 GENERALIZED ABDOMINAL PAIN: Status: ACTIVE | Noted: 2018-10-17

## 2018-11-09 ENCOUNTER — OFFICE VISIT (OUTPATIENT)
Dept: CARDIOLOGY CLINIC | Age: 63
End: 2018-11-09
Payer: COMMERCIAL

## 2018-11-09 VITALS
HEIGHT: 67 IN | SYSTOLIC BLOOD PRESSURE: 136 MMHG | DIASTOLIC BLOOD PRESSURE: 82 MMHG | BODY MASS INDEX: 29.79 KG/M2 | WEIGHT: 189.8 LBS | HEART RATE: 62 BPM

## 2018-11-09 DIAGNOSIS — I25.10 CORONARY ARTERY DISEASE INVOLVING NATIVE CORONARY ARTERY OF NATIVE HEART WITHOUT ANGINA PECTORIS: Primary | ICD-10-CM

## 2018-11-09 PROCEDURE — 4004F PT TOBACCO SCREEN RCVD TLK: CPT | Performed by: INTERNAL MEDICINE

## 2018-11-09 PROCEDURE — G8484 FLU IMMUNIZE NO ADMIN: HCPCS | Performed by: INTERNAL MEDICINE

## 2018-11-09 PROCEDURE — 3017F COLORECTAL CA SCREEN DOC REV: CPT | Performed by: INTERNAL MEDICINE

## 2018-11-09 PROCEDURE — G8419 CALC BMI OUT NRM PARAM NOF/U: HCPCS | Performed by: INTERNAL MEDICINE

## 2018-11-09 PROCEDURE — 99214 OFFICE O/P EST MOD 30 MIN: CPT | Performed by: INTERNAL MEDICINE

## 2018-11-09 PROCEDURE — G8599 NO ASA/ANTIPLAT THER USE RNG: HCPCS | Performed by: INTERNAL MEDICINE

## 2018-11-09 PROCEDURE — G8427 DOCREV CUR MEDS BY ELIG CLIN: HCPCS | Performed by: INTERNAL MEDICINE

## 2018-11-09 NOTE — PROGRESS NOTES
normal, teeth, gum and palate are normal, oral mucosa is normal without any notation of pallor or cyanosis  Neck - Supple. No jugular venous distention noted. No carotid bruits, no apical -carotid delay  Respiratory:  Normal breath sounds, No respiratory distress, No wheezing, No chest tenderness. ,no use of accessory muscles, diaphragm movement is normal  Cardiovascular: (PMI) apex non displaced,no lifts no thrills, no s3,no s4, Normal heart rate, Normal rhythm, No murmurs, No rubs, No gallops. Carotid arteries pulse and amplitude are normal no bruit, no abdominal bruit noted ( normal abdominal aorta ausculation), femoral arteries pulse and amplitude are normal no bruit, pedal pulses are normal  Femoral pulses have normal amplitude, no bruits   Extremities - No cyanosis, clubbing, or significant edema, no varicose veins    Abdomen - No masses, tenderness, or organomegaly, no hepato-splenomegally, no bruits  Musculoskeletal - No significant edema, no kyphosis or scoliosis, no deformity in any extremity noted, muscle strength and tone are normal  Skin: no ulcer,no scar,no stasis dermatitis   Neurologic - alert oriented times 3,Cranial nerves II through XII are grossly intact. There were no gross focal neurologic abnormalities. All sensory and motor nerves examined and were normal  Psychiatric: normal mood and affect    Lab Results   Component Value Date    TROPONINI <0.006 03/10/2014     BNP:    Lab Results   Component Value Date    BNP 33 03/09/2014     PT/INR:  No results found for: PTINR  No results found for: LABA1C  Lab Results   Component Value Date    CHOL 187 04/17/2013    TRIG 79 04/17/2013    HDL 79 (A) 04/17/2013    LDLCALC 92 04/17/2013     Lab Results   Component Value Date    ALT 8 (L) 06/17/2016    AST 13 (L) 06/17/2016     TSH:  No results found for: TSH    Impression:  Sukhdev Magana is a 58 y. o.year old who  has a past medical history of Anxiety; CAD (coronary artery disease);  Chest pain; Depression; GERD (gastroesophageal reflux disease); H/O cardiovascular stress test; H/O Doppler ultrasound; H/O echocardiogram; H/O echocardiogram; H/O exercise stress test; H/O percutaneous left heart catheterization; Hx of cardiovascular stress test; Hx of echocardiogram; Hyperlipidemia; Orthostatic hypotension; Other activity(E029.9); Post PTCA; and Post PTCA. and presents with     Plan:  1. CAD: Continue aspirin and change brilinita to 60 mg po bid,continue lopressor and zocor. 2. Vague chest discomfort: stress test is normal  3. Dyslipidemia: continue statins  4. HTN: stable, continue lopressor and lisinopril medications  5. Umbilical hernia s/p ? Infection, will recommend to see general SX.  6. Health maintenance: exerise and diet  All labs, medications and tests reviewed, continue all other medications of all above medical condition listed as is.

## 2018-11-12 PROBLEM — N28.89 LEFT KIDNEY MASS: Status: ACTIVE | Noted: 2018-11-12

## 2018-11-16 ENCOUNTER — HOSPITAL ENCOUNTER (OUTPATIENT)
Dept: CT IMAGING | Age: 63
Discharge: HOME | End: 2018-11-16
Payer: COMMERCIAL

## 2018-11-16 DIAGNOSIS — R10.84 GENERALIZED ABDOMINAL PAIN: ICD-10-CM

## 2018-11-16 DIAGNOSIS — N28.89 LEFT KIDNEY MASS: ICD-10-CM

## 2018-11-19 ENCOUNTER — HOSPITAL ENCOUNTER (OUTPATIENT)
Dept: CT IMAGING | Age: 63
Discharge: HOME | End: 2018-11-19
Payer: COMMERCIAL

## 2018-11-19 PROCEDURE — 74177 CT ABD & PELVIS W/CONTRAST: CPT

## 2018-11-19 PROCEDURE — 2580000003 HC RX 258: Performed by: INTERNAL MEDICINE

## 2018-11-19 PROCEDURE — 6360000004 HC RX CONTRAST MEDICATION: Performed by: INTERNAL MEDICINE

## 2018-11-19 RX ORDER — 0.9 % SODIUM CHLORIDE 0.9 %
10 VIAL (ML) INJECTION PRN
Status: DISCONTINUED | OUTPATIENT
Start: 2018-11-19 | End: 2018-11-20 | Stop reason: HOSPADM

## 2018-11-19 RX ADMIN — IOPAMIDOL 80 ML: 755 INJECTION, SOLUTION INTRAVENOUS at 10:44

## 2018-11-19 RX ADMIN — SODIUM CHLORIDE, PRESERVATIVE FREE 10 ML: 5 INJECTION INTRAVENOUS at 10:44

## 2019-02-06 ENCOUNTER — HOSPITAL ENCOUNTER (OUTPATIENT)
Dept: WOMENS IMAGING | Age: 64
Discharge: HOME | End: 2019-02-06
Payer: COMMERCIAL

## 2019-02-06 DIAGNOSIS — Z12.31 SCREENING MAMMOGRAM, ENCOUNTER FOR: ICD-10-CM

## 2019-02-06 PROCEDURE — 77067 SCR MAMMO BI INCL CAD: CPT

## 2019-02-11 ENCOUNTER — HOSPITAL ENCOUNTER (OUTPATIENT)
Dept: ULTRASOUND IMAGING | Age: 64
End: 2019-02-11
Payer: COMMERCIAL

## 2019-02-11 ENCOUNTER — HOSPITAL ENCOUNTER (OUTPATIENT)
Dept: WOMENS IMAGING | Age: 64
Discharge: HOME | End: 2019-02-11
Payer: COMMERCIAL

## 2019-02-11 DIAGNOSIS — N64.89 BREAST ASYMMETRY: ICD-10-CM

## 2019-02-11 PROCEDURE — 77065 DX MAMMO INCL CAD UNI: CPT

## 2019-02-11 RX ORDER — METOPROLOL SUCCINATE 25 MG/1
25 TABLET, EXTENDED RELEASE ORAL DAILY
Qty: 30 TABLET | Refills: 5 | Status: SHIPPED | OUTPATIENT
Start: 2019-02-11 | End: 2019-06-28 | Stop reason: SDUPTHER

## 2019-06-28 ENCOUNTER — OFFICE VISIT (OUTPATIENT)
Dept: CARDIOLOGY CLINIC | Age: 64
End: 2019-06-28
Payer: COMMERCIAL

## 2019-06-28 VITALS
SYSTOLIC BLOOD PRESSURE: 138 MMHG | BODY MASS INDEX: 30.38 KG/M2 | WEIGHT: 194 LBS | DIASTOLIC BLOOD PRESSURE: 82 MMHG | HEART RATE: 68 BPM | RESPIRATION RATE: 18 BRPM

## 2019-06-28 DIAGNOSIS — I25.10 CORONARY ARTERY DISEASE INVOLVING NATIVE CORONARY ARTERY OF NATIVE HEART WITHOUT ANGINA PECTORIS: Primary | ICD-10-CM

## 2019-06-28 PROCEDURE — G8599 NO ASA/ANTIPLAT THER USE RNG: HCPCS | Performed by: INTERNAL MEDICINE

## 2019-06-28 PROCEDURE — 99213 OFFICE O/P EST LOW 20 MIN: CPT | Performed by: INTERNAL MEDICINE

## 2019-06-28 PROCEDURE — G8417 CALC BMI ABV UP PARAM F/U: HCPCS | Performed by: INTERNAL MEDICINE

## 2019-06-28 PROCEDURE — 4004F PT TOBACCO SCREEN RCVD TLK: CPT | Performed by: INTERNAL MEDICINE

## 2019-06-28 PROCEDURE — 3017F COLORECTAL CA SCREEN DOC REV: CPT | Performed by: INTERNAL MEDICINE

## 2019-06-28 PROCEDURE — G8427 DOCREV CUR MEDS BY ELIG CLIN: HCPCS | Performed by: INTERNAL MEDICINE

## 2019-06-28 RX ORDER — MIRTAZAPINE 15 MG/1
TABLET, FILM COATED ORAL PRN
Refills: 5 | COMMUNITY
Start: 2019-06-06 | End: 2021-02-22

## 2019-06-28 RX ORDER — LISINOPRIL 20 MG/1
20 TABLET ORAL DAILY
Qty: 30 TABLET | Refills: 6 | Status: SHIPPED | OUTPATIENT
Start: 2019-06-28 | End: 2020-02-03

## 2019-06-28 RX ORDER — METOPROLOL SUCCINATE 25 MG/1
25 TABLET, EXTENDED RELEASE ORAL DAILY
Qty: 30 TABLET | Refills: 5 | Status: SHIPPED | OUTPATIENT
Start: 2019-06-28 | End: 2020-02-18 | Stop reason: ALTCHOICE

## 2019-07-15 ENCOUNTER — TELEPHONE (OUTPATIENT)
Dept: CARDIOLOGY CLINIC | Age: 64
End: 2019-07-15

## 2019-07-15 NOTE — TELEPHONE ENCOUNTER
Patient has had problems with her bp all weekend. She is feeling weak and not good.   bp 104/61 lowest   118/54 is highest  Please call patient

## 2019-08-28 ENCOUNTER — TELEPHONE (OUTPATIENT)
Dept: CARDIOLOGY CLINIC | Age: 64
End: 2019-08-28

## 2020-01-06 ENCOUNTER — OFFICE VISIT (OUTPATIENT)
Dept: CARDIOLOGY CLINIC | Age: 65
End: 2020-01-06
Payer: MEDICARE

## 2020-01-06 VITALS
HEIGHT: 67 IN | HEART RATE: 64 BPM | BODY MASS INDEX: 29.98 KG/M2 | SYSTOLIC BLOOD PRESSURE: 130 MMHG | WEIGHT: 191 LBS | DIASTOLIC BLOOD PRESSURE: 80 MMHG

## 2020-01-06 PROCEDURE — 99214 OFFICE O/P EST MOD 30 MIN: CPT | Performed by: INTERNAL MEDICINE

## 2020-01-06 RX ORDER — FAMOTIDINE 10 MG
10 TABLET ORAL DAILY
COMMUNITY

## 2020-01-06 NOTE — PROGRESS NOTES
Hermelinda Kat MD        OFFICE  FOLLOWUP NOTE    Chief complaints: patient is here for management of CAD, HTN,vertigo, chest pain    Subjective: patient feels better, no chest pain, no shortness of breath, no dizziness, no palpitations    HPI Martin Ceja is a 71 y. o.year old who  has a past medical history of Anxiety, CAD (coronary artery disease), Chest pain, Depression, GERD (gastroesophageal reflux disease), H/O cardiovascular stress test, H/O Doppler ultrasound, H/O echocardiogram, H/O echocardiogram, H/O exercise stress test, H/O percutaneous left heart catheterization, Hx of cardiovascular stress test, Hx of echocardiogram, Hyperlipidemia, Orthostatic hypotension, Other activity(E029.9), Post PTCA, and Post PTCA. and presents for management of CAD, HTN,vertigo, chest pain, which are well controlled      Current Outpatient Medications   Medication Sig Dispense Refill    famotidine (PEPCID) 10 MG tablet Take 10 mg by mouth 2 times daily      ticagrelor (BRILINTA) 60 MG TABS tablet Take 1 tablet by mouth 2 times daily 60 tablet 5    mirtazapine (REMERON) 15 MG tablet as needed  5    metoprolol succinate (TOPROL XL) 25 MG extended release tablet Take 1 tablet by mouth daily 30 tablet 5    lisinopril (PRINIVIL;ZESTRIL) 20 MG tablet Take 1 tablet by mouth daily 30 tablet 6    lidocaine-prilocaine (EMLA) 2.5-2.5 % cream Apply topically as needed. 1 Tube 3    diphenhydrAMINE (BENADRYL) 50 MG capsule 50 mg 1 hour prior to procedure (Patient taking differently: No sig reported) 2 capsule 0    acetaminophen (TYLENOL) 325 MG tablet Take 2 tablets by mouth every 6 hours as needed for Pain 120 tablet 3    ondansetron (ZOFRAN-ODT) 4 MG disintegrating tablet TAKE 1 TABLET SUBLINGUALLY EVERY 4 TO 6 HOURS AS NEEDED.   2    nitroGLYCERIN (NITROSTAT) 0.4 MG SL tablet Place 1 tablet under the tongue every 5 minutes as needed for Chest pain 25 tablet 3    meclizine (ANTIVERT) 12.5 MG tablet TAKE 1 TABLET BY MOUTH EVERY 6 HOURS AS NEEDED FOR DIZZINESS  2    VENTOLIN  (90 Base) MCG/ACT inhaler INHALE 2 PUFFS INTO THE LUNGS DAILY AS NEEDED  3    pantoprazole (PROTONIX) 40 MG tablet TAKE 1 TABLET BY MOUTH EVERY DAY  2    simvastatin (ZOCOR) 20 MG tablet Take 1 tablet by mouth nightly 30 tablet 6    citalopram (CELEXA) 20 MG tablet       dicyclomine (BENTYL) 20 MG tablet Take 20 mg by mouth every 6 hours       loratadine (CLARITIN) 10 MG tablet Take 10 mg by mouth daily.  aspirin EC 81 MG EC tablet Take 1 tablet by mouth daily. 30 tablet 3    triamterene-hydrochlorothiazide (MAXZIDE) 75-50 MG per tablet Take 1 tablet by mouth as needed       clonazePAM (KLONOPIN) 1 MG tablet Take 1 mg by mouth 3 times daily as needed. No current facility-administered medications for this visit. Allergies: Pregabalin; Hydromorphone hcl; Codeine; Aspirin; Diatrizoate; Gabapentin; Ibuprofen; and Iodine  Past Medical History:   Diagnosis Date    Anxiety     CAD (coronary artery disease)     Chest pain     Depression     GERD (gastroesophageal reflux disease)     H/O cardiovascular stress test     7/26/12-No scintigraphic evidence of inducible myocardial ischemia. Global Left ventricular sytolic function normal. Rest EF 64%. No ECG changes. Unremarkable pharmacological stress test. Normal Myocardial Perfusion Study. 3/30/11- EF70% Normal Myocardial Perfusion study. 2/18/2010 EF =>55%;1/2009 EF 65%, 1/2008, 1/2007, 5/2006, 2/2006 EF70%    H/O Doppler ultrasound 3/3/2008     Carotid Report- No Significant atherosclerotic plaque noted in the right internal carotid artery. The Doppler flow velocities w/in FREDERIC are within normal limits. There is intimal thinckening but no significant atherosclerotic plaque noted in LICA. The Dopple flow velocities w/in LICA are Within Normal Limits.  H/O echocardiogram     7/26/12- EF=>55%.  Left ventricular systolic function is [id number] EF =>55%, 12/2010 EF55%; RELEASE  11/2006    Ulnar nerve tranfer Left elbow    WRIST SURGERY  7/2008    Left wrist     Family History   Problem Relation Age of Onset    Diabetes Sister     Heart Disease Sister     High Blood Pressure Sister     Diabetes Brother     Heart Disease Brother     High Blood Pressure Brother     Cancer Maternal Aunt     Cancer Paternal Aunt     Diabetes Brother     High Blood Pressure Brother      Social History     Tobacco Use    Smoking status: Current Some Day Smoker     Packs/day: 0.25     Types: Cigarettes     Start date: 6/1/2015    Smokeless tobacco: Never Used    Tobacco comment: is qutting and says will be stopped within the next week   Substance Use Topics    Alcohol use: Yes     Comment: occ      [unfilled]  Review of Systems:   · Constitutional: No Fever or Weight Loss   · Eyes: No Decreased Vision  · ENT: No Headaches, Hearing Loss or Vertigo  · Cardiovascular: No chest pain, dyspnea on exertion,+ palpitations or loss of consciousness  · Respiratory: No cough or wheezing    · Gastrointestinal: No abdominal pain, appetite loss, blood in stools, constipation, diarrhea or heartburn  · Genitourinary: No dysuria, trouble voiding, or hematuria  · Musculoskeletal:  No gait disturbance, weakness or joint complaints  · Integumentary: No rash or pruritis  · Neurological: No TIA or stroke symptoms  · Psychiatric: No anxiety or depression  · Endocrine: No malaise, fatigue or temperature intolerance  · Hematologic/Lymphatic: No bleeding problems, blood clots or swollen lymph nodes  · Allergic/Immunologic: No nasal congestion or hives  All systems negative except as marked. Objective:  /80   Pulse 64   Ht 5' 7\" (1.702 m)   Wt 191 lb (86.6 kg)   BMI 29.91 kg/m²   Wt Readings from Last 3 Encounters:   01/06/20 191 lb (86.6 kg)   06/28/19 194 lb (88 kg)   12/17/18 197 lb 12.8 oz (89.7 kg)     Body mass index is 29.91 kg/m².   GENERAL - Alert, oriented, pleasant, in no apparent distress,normal grooming  HEENT - pupils are reactive to light and accomodation, cornea intact, conjunctive normal color, ears are normal in exam,throat exam in normal, teeth, gum and palate are normal, oral mucosa is normal without any notation of pallor or cyanosis  Neck - Supple. No jugular venous distention noted. No carotid bruits, no apical -carotid delay  Respiratory:  Normal breath sounds, No respiratory distress, No wheezing, No chest tenderness. ,no use of accessory muscles, diaphragm movement is normal  Cardiovascular: (PMI) apex non displaced,no lifts no thrills, no s3,no s4, Normal heart rate, Normal rhythm, No murmurs, No rubs, No gallops. Carotid arteries pulse and amplitude are normal no bruit, no abdominal bruit noted ( normal abdominal aorta ausculation), femoral arteries pulse and amplitude are normal no bruit, pedal pulses are normal  Femoral pulses have normal amplitude, no bruits   Extremities - No cyanosis, clubbing, or significant edema, no varicose veins    Abdomen - No masses, tenderness, or organomegaly, no hepato-splenomegally, no bruits  Musculoskeletal - No significant edema, no kyphosis or scoliosis, no deformity in any extremity noted, muscle strength and tone are normal  Skin: no ulcer,no scar,no stasis dermatitis   Neurologic - alert oriented times 3,Cranial nerves II through XII are grossly intact. There were no gross focal neurologic abnormalities.  All sensory and motor nerves examined and were normal  Psychiatric: normal mood and affect    Lab Results   Component Value Date    TROPONINI <0.006 03/10/2014     BNP:    Lab Results   Component Value Date    BNP 33 03/09/2014     PT/INR:  No results found for: PTINR  No results found for: LABA1C  Lab Results   Component Value Date    CHOL 187 04/17/2013    TRIG 79 04/17/2013    HDL 79 (A) 04/17/2013    LDLCALC 92 04/17/2013     Lab Results   Component Value Date    ALT 8 (L) 06/17/2016    AST 13 (L) 06/17/2016     TSH:  No results found for: TSH    Impression:  Kodak Holden is a 71 y. o.year old who  has a past medical history of Anxiety, CAD (coronary artery disease), Chest pain, Depression, GERD (gastroesophageal reflux disease), H/O cardiovascular stress test, H/O Doppler ultrasound, H/O echocardiogram, H/O echocardiogram, H/O exercise stress test, H/O percutaneous left heart catheterization, Hx of cardiovascular stress test, Hx of echocardiogram, Hyperlipidemia, Orthostatic hypotension, Other activity(E029.9), Post PTCA, and Post PTCA. and presents with     Plan:  1. CAD: Continue aspirin and ok to stop brilinita to 60 mg ,continue lopressor and zocor. 2. Palpitations: patient is relutctant to get 24 hrs holter monitor  3. Dyslipidemia: continue statins  4. HTN: stable, continue lopressor and lisinopril medications  5. Umbilical hernia s/p sx. Health maintenance: exerise and diet  All labs, medications and tests reviewed, continue all other medications of all above medical condition listed as is.

## 2020-02-03 ENCOUNTER — OFFICE VISIT (OUTPATIENT)
Dept: CARDIOLOGY CLINIC | Age: 65
End: 2020-02-03
Payer: MEDICARE

## 2020-02-03 VITALS
SYSTOLIC BLOOD PRESSURE: 138 MMHG | WEIGHT: 187.8 LBS | BODY MASS INDEX: 29.47 KG/M2 | DIASTOLIC BLOOD PRESSURE: 88 MMHG | HEART RATE: 62 BPM | HEIGHT: 67 IN

## 2020-02-03 PROCEDURE — 99214 OFFICE O/P EST MOD 30 MIN: CPT | Performed by: INTERNAL MEDICINE

## 2020-02-03 NOTE — PROGRESS NOTES
13 (L) 06/17/2016     TSH:  No results found for: TSH    Impression:  Ruby De La Rosa is a 71 y. o.year old who  has a past medical history of Anxiety, CAD (coronary artery disease), Chest pain, Depression, GERD (gastroesophageal reflux disease), H/O cardiovascular stress test, H/O Doppler ultrasound, H/O echocardiogram, H/O echocardiogram, H/O exercise stress test, H/O percutaneous left heart catheterization, Hx of cardiovascular stress test, Hx of echocardiogram, Hyperlipidemia, Orthostatic hypotension, Other activity(E029.9), Post PTCA, and Post PTCA. and presents with     Plan:  1. Skin rash; her rash is not typical of lisinopril, she can hold it for few weeks and see if her rash gets better or not, if lisinopril is not the cause, may stop lopressor in few months  2. CAD: Continue aspirin,continue lopressor and zocor. 3. Palpitations: stable  4. Dyslipidemia: continue statins  5. HTN: stable, continue lopressor and lisinopril medications  Umbilical hernia s/p sx. Health maintenance: exerise and dietAll labs, medications and tests reviewed, continue all other medications of all above medical condition listed as is.     [unfilled]

## 2020-02-03 NOTE — LETTER
CLINICAL STAFF DOCUMENTATION    Mert Elder  1955  [id number]    Have you had any Chest Pain - No      Have you had any Shortness of Breath - No    Have you had any dizziness - No      Have you had any palpitations - No      Do you have any edema - swelling in legs    If Yes - CHECK TO SEE IF THE EDEMA IS PITTING  How long have they been having edema - Day's  If Yes - Have they worn compression stockings No    Do you have a surgery or procedure scheduled in the near future - No    Ask patient if they want to [facility] if they are not already signed [facility]    Check to see if we have an E-MAIL on file for the patient    Check medication list thoroughly!!!  BE SURE TO ASK PATIENT IF THEY NEED MEDICATION REFILLS

## 2020-02-18 ENCOUNTER — TELEPHONE (OUTPATIENT)
Dept: CARDIOLOGY CLINIC | Age: 65
End: 2020-02-18

## 2020-02-18 RX ORDER — LISINOPRIL 20 MG/1
20 TABLET ORAL 2 TIMES DAILY
COMMUNITY
End: 2021-02-22 | Stop reason: SDUPTHER

## 2020-02-18 RX ORDER — AMLODIPINE BESYLATE 5 MG/1
5 TABLET ORAL DAILY
Qty: 30 TABLET | Refills: 5 | Status: SHIPPED | OUTPATIENT
Start: 2020-02-18 | End: 2020-07-28

## 2020-02-18 NOTE — TELEPHONE ENCOUNTER
Pt calling about the lisinipril. wayne took her off of lisinipril. Pt went back on lisinipril due  To BP @ 205-80 over a week ago. BP is back to normal... 160/70. Please call pt if she needs to keep the appt in March.

## 2020-02-18 NOTE — TELEPHONE ENCOUNTER
Patient is taking Lisinopril 0mg BID and Metroprolol 25 mg BID per PCP. Today  B/P 179/69, HR 60.  Per Dr Nora Reyes start Norvasc 5mg daily

## 2020-03-05 ENCOUNTER — TELEPHONE (OUTPATIENT)
Dept: CARDIOLOGY CLINIC | Age: 65
End: 2020-03-05

## 2020-07-28 RX ORDER — AMLODIPINE BESYLATE 5 MG/1
TABLET ORAL
Qty: 90 TABLET | Refills: 1 | Status: SHIPPED | OUTPATIENT
Start: 2020-07-28 | End: 2021-01-15

## 2021-01-15 RX ORDER — AMLODIPINE BESYLATE 5 MG/1
5 TABLET ORAL DAILY
Qty: 90 TABLET | Refills: 3 | Status: SHIPPED | OUTPATIENT
Start: 2021-01-15 | End: 2021-05-24

## 2021-02-22 ENCOUNTER — OFFICE VISIT (OUTPATIENT)
Dept: CARDIOLOGY CLINIC | Age: 66
End: 2021-02-22
Payer: COMMERCIAL

## 2021-02-22 VITALS
DIASTOLIC BLOOD PRESSURE: 60 MMHG | WEIGHT: 213 LBS | SYSTOLIC BLOOD PRESSURE: 130 MMHG | BODY MASS INDEX: 33.43 KG/M2 | HEIGHT: 67 IN

## 2021-02-22 DIAGNOSIS — I20.8 STABLE ANGINA PECTORIS (HCC): ICD-10-CM

## 2021-02-22 DIAGNOSIS — I49.9 IRREGULAR HEART BEAT: Primary | ICD-10-CM

## 2021-02-22 PROCEDURE — 99214 OFFICE O/P EST MOD 30 MIN: CPT | Performed by: INTERNAL MEDICINE

## 2021-02-22 PROCEDURE — 4040F PNEUMOC VAC/ADMIN/RCVD: CPT | Performed by: INTERNAL MEDICINE

## 2021-02-22 PROCEDURE — 1123F ACP DISCUSS/DSCN MKR DOCD: CPT | Performed by: INTERNAL MEDICINE

## 2021-02-22 PROCEDURE — 3017F COLORECTAL CA SCREEN DOC REV: CPT | Performed by: INTERNAL MEDICINE

## 2021-02-22 PROCEDURE — G8427 DOCREV CUR MEDS BY ELIG CLIN: HCPCS | Performed by: INTERNAL MEDICINE

## 2021-02-22 PROCEDURE — G8417 CALC BMI ABV UP PARAM F/U: HCPCS | Performed by: INTERNAL MEDICINE

## 2021-02-22 PROCEDURE — 1090F PRES/ABSN URINE INCON ASSESS: CPT | Performed by: INTERNAL MEDICINE

## 2021-02-22 PROCEDURE — 93000 ELECTROCARDIOGRAM COMPLETE: CPT | Performed by: INTERNAL MEDICINE

## 2021-02-22 PROCEDURE — 1036F TOBACCO NON-USER: CPT | Performed by: INTERNAL MEDICINE

## 2021-02-22 PROCEDURE — G8400 PT W/DXA NO RESULTS DOC: HCPCS | Performed by: INTERNAL MEDICINE

## 2021-02-22 PROCEDURE — G8484 FLU IMMUNIZE NO ADMIN: HCPCS | Performed by: INTERNAL MEDICINE

## 2021-02-22 RX ORDER — PROMETHAZINE HYDROCHLORIDE 25 MG/1
TABLET ORAL
COMMUNITY
End: 2021-11-11

## 2021-02-22 RX ORDER — SIMVASTATIN 20 MG
20 TABLET ORAL NIGHTLY
Qty: 90 TABLET | Refills: 3 | Status: SHIPPED | OUTPATIENT
Start: 2021-02-22 | End: 2022-08-29 | Stop reason: SDUPTHER

## 2021-02-22 RX ORDER — LISINOPRIL 20 MG/1
20 TABLET ORAL 2 TIMES DAILY
Qty: 90 TABLET | Refills: 3 | Status: SHIPPED | OUTPATIENT
Start: 2021-02-22 | End: 2021-05-24

## 2021-02-22 NOTE — PROGRESS NOTES
Luis Alberto Bowman MD        OFFICE  FOLLOWUP NOTE    Chief complaints: patient is here for management of CAD, HTN,vertigo, chest pain. ? lichen planus    Subjective: patient feels better, + chest pain, no shortness of breath, no dizziness, no palpitations    HPI Raoul Solomon is a 76 y. o.year old who  has a past medical history of Anxiety, CAD (coronary artery disease), Chest pain, Depression, GERD (gastroesophageal reflux disease), H/O cardiovascular stress test, H/O Doppler ultrasound, H/O echocardiogram, H/O echocardiogram, H/O exercise stress test, H/O percutaneous left heart catheterization, Hx of cardiovascular stress test, Hx of echocardiogram, Hyperlipidemia, Orthostatic hypotension, Other activity(E029.9), Post PTCA, and Post PTCA. and presents for management of CAD, HTN,vertigo, chest pain. ?  lichen planus which are well controlled    She has occasional chest pain   Current Outpatient Medications   Medication Sig Dispense Refill    promethazine (PHENERGAN) 25 MG tablet promethazine 25 mg tablet   TAKE 1 TABLET BY MOUTH TWICE A DAY AS NEEDED for dizziness      amLODIPine (NORVASC) 5 MG tablet Take 1 tablet by mouth daily 90 tablet 3    metoprolol tartrate (LOPRESSOR) 25 MG tablet Take 25 mg by mouth 2 times daily      lisinopril (PRINIVIL;ZESTRIL) 20 MG tablet Take 20 mg by mouth 2 times daily      famotidine (PEPCID) 10 MG tablet Take 10 mg by mouth 2 times daily      diphenhydrAMINE (BENADRYL) 50 MG capsule 50 mg 1 hour prior to procedure (Patient taking differently: No sig reported) 2 capsule 0    nitroGLYCERIN (NITROSTAT) 0.4 MG SL tablet Place 1 tablet under the tongue every 5 minutes as needed for Chest pain 25 tablet 3    VENTOLIN  (90 Base) MCG/ACT inhaler INHALE 2 PUFFS INTO THE LUNGS DAILY AS NEEDED  3    pantoprazole (PROTONIX) 40 MG tablet TAKE 1 TABLET BY MOUTH EVERY DAY  2    simvastatin (ZOCOR) 20 MG tablet Take 1 tablet by mouth nightly 30 tablet 6    citalopram (CELEXA) 20 MG tablet       dicyclomine (BENTYL) 20 MG tablet Take 20 mg by mouth every 6 hours       loratadine (CLARITIN) 10 MG tablet Take 10 mg by mouth daily.  aspirin EC 81 MG EC tablet Take 1 tablet by mouth daily. 30 tablet 3    triamterene-hydrochlorothiazide (MAXZIDE) 75-50 MG per tablet Take 1 tablet by mouth as needed       lidocaine-prilocaine (EMLA) 2.5-2.5 % cream Apply topically as needed. 1 Tube 3    meclizine (ANTIVERT) 12.5 MG tablet TAKE 1 TABLET BY MOUTH EVERY 6 HOURS AS NEEDED FOR DIZZINESS  2     No current facility-administered medications for this visit. Allergies: Pregabalin, Hydromorphone hcl, Codeine, Aspirin, Diatrizoate, Gabapentin, Ibuprofen, and Iodine  Past Medical History:   Diagnosis Date    Anxiety     CAD (coronary artery disease)     Chest pain     Depression     GERD (gastroesophageal reflux disease)     H/O cardiovascular stress test     7/26/12-No scintigraphic evidence of inducible myocardial ischemia. Global Left ventricular sytolic function normal. Rest EF 64%. No ECG changes. Unremarkable pharmacological stress test. Normal Myocardial Perfusion Study. 3/30/11- EF70% Normal Myocardial Perfusion study. 2/18/2010 EF =>55%;1/2009 EF 65%, 1/2008, 1/2007, 5/2006, 2/2006 EF70%    H/O Doppler ultrasound 3/3/2008     Carotid Report- No Significant atherosclerotic plaque noted in the right internal carotid artery. The Doppler flow velocities w/in FREDERIC are within normal limits. There is intimal thinckening but no significant atherosclerotic plaque noted in LICA. The Dopple flow velocities w/in LICA are Within Normal Limits.  H/O echocardiogram     7/26/12- EF=>55%.  Left ventricular systolic function is [id number] EF =>55%, 12/2010 EF55%;  2/14/2008    H/O echocardiogram 07/17/2017    EF >55%    H/O exercise stress test 06/22/2018    treadmill    H/O percutaneous left heart catheterization 3/11/14    EF 55% Left anterior descending artery has Pressure Sister     Diabetes Brother     Heart Disease Brother     High Blood Pressure Brother     Cancer Maternal Aunt     Cancer Paternal Aunt     Diabetes Brother     High Blood Pressure Brother      Social History     Tobacco Use    Smoking status: Former Smoker     Packs/day: 0.25     Types: Cigarettes     Start date: 6/1/2015    Smokeless tobacco: Never Used    Tobacco comment: is qutting and says will be stopped within the next week   Substance Use Topics    Alcohol use: Yes     Comment: occ      [unfilled]  Review of Systems:   · Constitutional: No Fever or Weight Loss   · Eyes: No Decreased Vision  · ENT: No Headaches, Hearing Loss or Vertigo  · Cardiovascular: No chest pain, dyspnea on exertion, palpitations or loss of consciousness  · Respiratory: No cough or wheezing    · Gastrointestinal: No abdominal pain, appetite loss, blood in stools, constipation, diarrhea or heartburn  · Genitourinary: No dysuria, trouble voiding, or hematuria  · Musculoskeletal:  No gait disturbance, weakness or joint complaints  · Integumentary: No rash or pruritis  · Neurological: No TIA or stroke symptoms  · Psychiatric: No anxiety or depression  · Endocrine: No malaise, fatigue or temperature intolerance  · Hematologic/Lymphatic: No bleeding problems, blood clots or swollen lymph nodes  · Allergic/Immunologic: No nasal congestion or hives  All systems negative except as marked. Objective:  /60   Ht 5' 7\" (1.702 m)   Wt 213 lb (96.6 kg)   BMI 33.36 kg/m²   Wt Readings from Last 3 Encounters:   02/22/21 213 lb (96.6 kg)   02/03/20 187 lb 12.8 oz (85.2 kg)   01/06/20 191 lb (86.6 kg)     Body mass index is 33.36 kg/m². GENERAL - Alert, oriented, pleasant, in no apparent distress,normal grooming  HEENT - pupils are intact, cornea intact, conjunctive normal color, ears are normal in exam,  Neck - Supple. No jugular venous distention noted.  No carotid bruits, no apical -carotid delay  Respiratory:  Normal breath sounds, No respiratory distress, No wheezing, No chest tenderness. ,no use of accessory muscles, diaphragm movement is normal  Cardiovascular: (PMI) apex non displaced,no lifts no thrills, no s3,no s4, Normal heart rate, Normal rhythm, No murmurs, No rubs, No gallops. Carotid arteries pulse and amplitude are normal no bruit, no abdominal bruit noted ( normal abdominal aorta ausculation),   Extremities - No cyanosis, clubbing, or significant edema, no varicose veins    Abdomen - No masses, tenderness, or organomegaly, no hepato-splenomegally, no bruits  Musculoskeletal - No significant edema, no kyphosis or scoliosis, no deformity in any extremity noted, muscle strength and tone are normal  Skin: no ulcer,no scar,no stasis dermatitis   Neurologic - alert oriented times 3,Cranial nerves II through XII are grossly intact. There were no gross focal neurologic abnormalities. Psychiatric: normal mood and affect    Lab Results   Component Value Date    TROPONINI <0.006 03/10/2014     BNP:    Lab Results   Component Value Date    BNP 33 03/09/2014     PT/INR:  No results found for: PTINR  No results found for: LABA1C  Lab Results   Component Value Date    CHOL 187 04/17/2013    TRIG 79 04/17/2013    HDL 79 (A) 04/17/2013    LDLCALC 92 04/17/2013     Lab Results   Component Value Date    ALT 8 (L) 06/17/2016    AST 13 (L) 06/17/2016     TSH:  No results found for: TSH  EKG::nsr, T wave inversion and ST depression in lateral leads  Impression:  Mónica Alvarez is a 76 y. o.year old who  has a past medical history of Anxiety, CAD (coronary artery disease), Chest pain, Depression, GERD (gastroesophageal reflux disease), H/O cardiovascular stress test, H/O Doppler ultrasound, H/O echocardiogram, H/O echocardiogram, H/O exercise stress test, H/O percutaneous left heart catheterization, Hx of cardiovascular stress test, Hx of echocardiogram, Hyperlipidemia, Orthostatic hypotension, Other activity(E029.9), Post PTCA, and Post PTCA. and presents with     Plan:  1. Occasional chest pain and Abnormal EKG: there are new st depression and t wave inversion lateral precordial leads, will get stress test and echo  2. kin rash; her rash is not typical of lisinopril, she can hold it for few weeks and see if her rash gets better or not, if lisinopril is not the cause, may stop lopressor in few months  3. CAD: Continue aspirin,continue lopressor and zocor. 4. ? Anemia: will get labs from PMD office  5. Palpitations: stable  6. Dyslipidemia: continue statins  7. HTN: stable, continue lopressor and lisinopril and norvasc and maxide medication  8. Umbilical hernia s/p sx. Health maintenance: exerise and di  All labs, medications and tests reviewed, continue all other medications of all above medical condition listed as is.     [unfilled]

## 2021-02-22 NOTE — LETTER
Brenda Hart Round  1955  [id number]    Have you had any Chest Pain that is not new? - No      Have you had any Shortness of Breath - No      Have you had any dizziness - Yes  If Yes DO ORTHOSTATIC BP - when do you feel dizzy pt has vertgo      Have you had any palpitations that are not new? - Yes  If Yes DO EKG - Do you feel your heart fluttering  How long does it last - .30  seconds   Few times a week last one as Friday or sat     Is the patient on any of the following medications -     Do you have any edema - swelling in legs    If Yes - CHECK TO SEE IF THE EDEMA IS PITTING  How long have they been having edema - Years  If Yes - Have they worn compression stockings No    Do you have a surgery or procedure scheduled in the near future - Yes  If Yes- DO EKG  If Yes - Who is the surgery with? Phone number of physician   Fax number of physician   Type of surgery eyes   GIVE THIS INFORMATION TO KAREEM WILCOX    ? [facility] not [facility]    ? Check to see if we have an E-MAIL on file for the patient    ? Check medication list thoroughly!!! AND RECONCILE OUTSIDE MEDICATIONS  If dose has changed change the entire order not just the MG  BE SURE TO ASK PATIENT IF THEY NEED MEDICATION REFILLS    ?  At check out add to every patient's \"wrap up\" the following dot phrase AFTERHOURSEDUCATION and ensure we explain this to our patients

## 2021-02-22 NOTE — PATIENT INSTRUCTIONS
Please be informed that if you contact our office outside of normal business hours the physician on call cannot help with any scheduling or rescheduling issues, procedure instruction questions or any type of medication issue. We advise you for any urgent/emergency that you go to the nearest emergency room! PLEASE CALL OUR OFFICE DURING NORMAL BUSINESS HOURS    Monday - Friday   8 am to 5 pm    [place]: [phone]    [place]:  [phone]  **It is YOUR responsibilty to bring medication bottles and/or updated medication list to [address] [place] [address].  This will allow us to better serve you and all your healthcare needs**

## 2021-02-24 ENCOUNTER — TELEPHONE (OUTPATIENT)
Dept: CARDIOLOGY CLINIC | Age: 66
End: 2021-02-24

## 2021-03-10 ENCOUNTER — PROCEDURE VISIT (OUTPATIENT)
Dept: CARDIOLOGY CLINIC | Age: 66
End: 2021-03-10
Payer: COMMERCIAL

## 2021-03-10 DIAGNOSIS — I20.8 STABLE ANGINA PECTORIS (HCC): ICD-10-CM

## 2021-03-10 LAB
LV EF: 73 %
LVEF MODALITY: NORMAL

## 2021-03-10 PROCEDURE — 93017 CV STRESS TEST TRACING ONLY: CPT | Performed by: INTERNAL MEDICINE

## 2021-03-10 PROCEDURE — A9500 TC99M SESTAMIBI: HCPCS | Performed by: INTERNAL MEDICINE

## 2021-03-10 PROCEDURE — 93016 CV STRESS TEST SUPVJ ONLY: CPT | Performed by: INTERNAL MEDICINE

## 2021-03-10 PROCEDURE — 93018 CV STRESS TEST I&R ONLY: CPT | Performed by: INTERNAL MEDICINE

## 2021-03-10 PROCEDURE — 78452 HT MUSCLE IMAGE SPECT MULT: CPT | Performed by: INTERNAL MEDICINE

## 2021-03-17 ENCOUNTER — TELEPHONE (OUTPATIENT)
Dept: CARDIOLOGY CLINIC | Age: 66
End: 2021-03-17

## 2021-03-17 NOTE — TELEPHONE ENCOUNTER
[address] .    Normal LV function.   Angelina Isiah is normal isotope uptake following exercise and at rest. There is no    evidence of exercise induced ischemia.    This is a normal study. Spoke to patient regarding results of stress test. Pt voiced understanding.

## 2021-03-18 ENCOUNTER — PROCEDURE VISIT (OUTPATIENT)
Dept: CARDIOLOGY CLINIC | Age: 66
End: 2021-03-18
Payer: COMMERCIAL

## 2021-03-18 DIAGNOSIS — R07.9 CHEST PAIN, UNSPECIFIED TYPE: Primary | ICD-10-CM

## 2021-03-18 DIAGNOSIS — I20.8 STABLE ANGINA PECTORIS (HCC): ICD-10-CM

## 2021-03-18 DIAGNOSIS — I49.9 IRREGULAR HEART BEAT: ICD-10-CM

## 2021-03-18 LAB
LV EF: 58 %
LVEF MODALITY: NORMAL

## 2021-03-18 PROCEDURE — 93306 TTE W/DOPPLER COMPLETE: CPT | Performed by: INTERNAL MEDICINE

## 2021-04-02 PROBLEM — I10 ESSENTIAL HYPERTENSION: Status: ACTIVE | Noted: 2021-04-02

## 2021-04-02 PROBLEM — E78.2 MIXED HYPERLIPIDEMIA: Status: ACTIVE | Noted: 2021-04-02

## 2021-04-06 ENCOUNTER — TELEMEDICINE (OUTPATIENT)
Dept: CARDIOLOGY CLINIC | Age: 66
End: 2021-04-06
Payer: COMMERCIAL

## 2021-04-06 DIAGNOSIS — I25.10 CORONARY ARTERY DISEASE INVOLVING NATIVE CORONARY ARTERY OF NATIVE HEART WITHOUT ANGINA PECTORIS: Primary | ICD-10-CM

## 2021-04-06 DIAGNOSIS — E78.2 MIXED HYPERLIPIDEMIA: ICD-10-CM

## 2021-04-06 DIAGNOSIS — I10 ESSENTIAL HYPERTENSION: ICD-10-CM

## 2021-04-06 PROCEDURE — 1036F TOBACCO NON-USER: CPT | Performed by: NURSE PRACTITIONER

## 2021-04-06 PROCEDURE — 99214 OFFICE O/P EST MOD 30 MIN: CPT | Performed by: NURSE PRACTITIONER

## 2021-04-06 PROCEDURE — G8400 PT W/DXA NO RESULTS DOC: HCPCS | Performed by: NURSE PRACTITIONER

## 2021-04-06 PROCEDURE — G8427 DOCREV CUR MEDS BY ELIG CLIN: HCPCS | Performed by: NURSE PRACTITIONER

## 2021-04-06 PROCEDURE — 1090F PRES/ABSN URINE INCON ASSESS: CPT | Performed by: NURSE PRACTITIONER

## 2021-04-06 PROCEDURE — 3017F COLORECTAL CA SCREEN DOC REV: CPT | Performed by: NURSE PRACTITIONER

## 2021-04-06 PROCEDURE — 1123F ACP DISCUSS/DSCN MKR DOCD: CPT | Performed by: NURSE PRACTITIONER

## 2021-04-06 PROCEDURE — G8417 CALC BMI ABV UP PARAM F/U: HCPCS | Performed by: NURSE PRACTITIONER

## 2021-04-06 PROCEDURE — 4040F PNEUMOC VAC/ADMIN/RCVD: CPT | Performed by: NURSE PRACTITIONER

## 2021-04-06 NOTE — PROGRESS NOTES
[facility]                    2021    TELEHEALTH EVALUATION -- Audio/Visual (During EIO-96 public health emergency)    HPI:    Francoise Linton (:  1955) has requested an audio/video evaluation for : CAD, HTN, and HLD. Review of Systems   All other systems reviewed and are negative. Prior to Visit Medications    Medication Sig Taking? Authorizing Provider   promethazine (PHENERGAN) 25 MG tablet promethazine 25 mg tablet   TAKE 1 TABLET BY MOUTH TWICE A DAY AS NEEDED for dizziness Yes Historical Provider, MD   simvastatin (ZOCOR) 20 MG tablet Take 1 tablet by mouth nightly Yes Annalise March MD   metoprolol tartrate (LOPRESSOR) 25 MG tablet Take 1 tablet by mouth 2 times daily Yes Annalise March MD   lisinopril (PRINIVIL;ZESTRIL) 20 MG tablet Take 1 tablet by mouth 2 times daily Yes Annailse March MD   amLODIPine (NORVASC) 5 MG tablet Take 1 tablet by mouth daily Yes DENVER Hull - CNP   famotidine (PEPCID) 10 MG tablet Take 10 mg by mouth 2 times daily Yes Historical Provider, MD   lidocaine-prilocaine (EMLA) 2.5-2.5 % cream Apply topically as needed. Yes Lopez Fan MD   diphenhydrAMINE (BENADRYL) 50 MG capsule 50 mg 1 hour prior to procedure  Patient taking differently: No sig reported Yes Lopez Fan MD   VENTOLIN  (90 Base) MCG/ACT inhaler INHALE 2 PUFFS INTO THE LUNGS DAILY AS NEEDED Yes Historical Provider, MD   pantoprazole (PROTONIX) 40 MG tablet TAKE 1 TABLET BY MOUTH EVERY DAY Yes Historical Provider, MD   citalopram (CELEXA) 20 MG tablet  Yes Historical Provider, MD   dicyclomine (BENTYL) 20 MG tablet Take 20 mg by mouth every 6 hours  Yes Historical Provider, MD   loratadine (CLARITIN) 10 MG tablet Take 10 mg by mouth daily. Yes Historical Provider, MD   aspirin EC 81 MG EC tablet Take 1 tablet by mouth daily.  Yes Toña Nicholas MD   triamterene-hydrochlorothiazide (MAXZIDE) 75-50 MG per tablet colonoscopy  02/22/2027    Hepatitis A vaccine  Aged Out    Hepatitis B vaccine  Aged Out    Hib vaccine  Aged Out    Meningococcal (ACWY) vaccine  Aged Out       PHYSICAL EXAMINATION:  [ INSTRUCTIONS:  \"[x]\" Indicates a positive item  \"[]\" Indicates a negative item  -- DELETE ALL ITEMS NOT EXAMINED]  Vital Signs: (As obtained by patient/caregiver or practitioner observation)    Constitutional: [x] Appears well-developed and well-nourished [] No apparent distress      [] Abnormal-   Mental status  [x] Alert and awake  [x] Oriented to person/place/time []Able to follow commands      Eyes:  EOM    [x]  Normal  [] Abnormal-  Sclera  [x]  Normal  [] Abnormal -         Discharge []  None visible  [] Abnormal -    HENT:   [x] Normocephalic, atraumatic. [] Abnormal   [] Mouth/Throat: Mucous membranes are moist.     External Ears [x] Normal  [] Abnormal-     Neck: [x] No visualized mass     Pulmonary/Chest: [x] Respiratory effort normal.  [] No visualized signs of difficulty breathing or respiratory distress        [] Abnormal-      Musculoskeletal:   [x] Normal gait with no signs of ataxia         [x] Normal range of motion of neck        [] Abnormal-       Neurological:        [x] No Facial Asymmetry (Cranial nerve 7 motor function) (limited exam to video visit)          [x] No gaze palsy        [] Abnormal-         Skin:        [x] No significant exanthematous lesions or discoloration noted on facial skin         [] Abnormal-            Psychiatric:       [x] Normal Affect [x] No Hallucinations        [] Abnormal-     Other pertinent observable physical exam findings-     Due to this being a TeleHealth encounter, evaluation of the following organ systems is limited: Vitals/Constitutional/EENT/Resp/CV/GI//MS/Neuro/Skin/Heme-Lymph-Imm. Echo:3/18/21  Left ventricular systolic function is normal.   Ejection fraction is visually estimated at 55-60%. Moderate aortic regurgitation; PHT: 382 msec.    Sclerotic, but non-stenotic aortic valve. No evidence of any pericardial effusion. Stress test: 3/10/21  No ischemia         All current labs and testing reviewed       ASSESSMENT/PLAN:  1. Coronary artery disease involving native coronary artery of native heart without angina pectoris  -PCI of LAD (2012) and RCA (2013). Recent normal stress test and echo. Primary and secondary prevention discussed with patient:   -Low sodium cardiac diet   -exercise 30 min a day three days a week  Continue current medications Norvasc, lisinopril, lopressor, zocor, asa.      2. Essential hypertension  -Stable, continue with current medications. 3. Mixed hyperlipidemia  -At or near goal No    -No recent labs available- will request copy from PCP-   -She is to continue current medications (Zocor) Hepatic function panel WNL. No abdominal pain. No myalgias.     -The nature of cardiac risk has been fully discussed with this patient. I have made her aware of her LDL target goal given her cardiovascular risk analysis. I have discussed the appropriate diet. The need for lifelong compliance in order to reduce risk is stressed. A regular exercise program is recommended to help achieve and maintain normal body weight, fitness and improve lipid balance. A written copy of a low fat, low cholesterol diet has been given to the patient. Return in about 6 months (around 10/6/2021). An  electronic signature was used to authenticate this note. --DENVER García - CNP on 4/6/2021 at 3:47 PM        I confirm that this visit was completed in a telehealth setting ,using synchronous audiovisual technology for real time patient interaction . The patient identity with name and date of birth was confirmed . This evaluation of patient was done by telehealth in the setting of ZFNQJ-88 Public health emergency , which precluded assurance of safe in person visit at the time of service. The patient consented to and accepts potential risks associated with telemedical evaluation and care was taken to assess yaz presence of any medical issues that would be more  appropriate for expedited in -person care. Pursuant to the emergency declaration under the [address] [place] [address] waiver authority and the [employer] Act, this Virtual  Visit was conducted, with patient's consent, to reduce the patient's risk of exposure to COVID-19 and provide continuity of care for an established patient. Services were provided through a video synchronous discussion virtually to substitute for in-person clinic visit. I Affirm this is a Patient Initiated Episode with an Established Patient who has not had a related appointment within my department in the past 7 days or scheduled within the next 24 hours.     Total Time: minutes: 21-30 minutes

## 2021-04-09 ENCOUNTER — TELEPHONE (OUTPATIENT)
Dept: CARDIOLOGY CLINIC | Age: 66
End: 2021-04-09

## 2021-04-09 NOTE — TELEPHONE ENCOUNTER
Patient states last night when she changed position she got very dizzy. Today she has had 2 episodes of dizzy, palpitations, nausea, weakness. Patient didn't mention at V Visit as she thought it was sinus related but now happening very frequently. Patient does not have way to check vitals.  Please advise

## 2021-04-09 NOTE — TELEPHONE ENCOUNTER
Patient was asked to call if she experienced  Palpitations .  Today   She is light headed , and having palpitations  she was told she has a leaky valve  She has had 2 episodes today

## 2021-04-12 ENCOUNTER — NURSE ONLY (OUTPATIENT)
Dept: CARDIOLOGY CLINIC | Age: 66
End: 2021-04-12
Payer: COMMERCIAL

## 2021-04-12 VITALS
SYSTOLIC BLOOD PRESSURE: 118 MMHG | BODY MASS INDEX: 32.99 KG/M2 | WEIGHT: 210.2 LBS | HEIGHT: 67 IN | HEART RATE: 80 BPM | DIASTOLIC BLOOD PRESSURE: 72 MMHG

## 2021-04-12 DIAGNOSIS — R42 DIZZINESS: Primary | ICD-10-CM

## 2021-04-12 DIAGNOSIS — R11.0 NAUSEA: ICD-10-CM

## 2021-04-12 DIAGNOSIS — R53.1 WEAKNESS: ICD-10-CM

## 2021-04-12 DIAGNOSIS — R00.2 PALPITATIONS: ICD-10-CM

## 2021-04-12 PROCEDURE — 93228 REMOTE 30 DAY ECG REV/REPORT: CPT | Performed by: INTERNAL MEDICINE

## 2021-04-12 PROCEDURE — 99212 OFFICE O/P EST SF 10 MIN: CPT | Performed by: NURSE PRACTITIONER

## 2021-04-12 NOTE — PROGRESS NOTES
[address] [place],[address]  Phone: [phone]    Fax [phone]                  Eugenia No MD, Wali Simpson MD, Babak Fernandez MD, Leatrice Deist, MD Cyril Lutes, MD Emma Apgar, MD Debbora Offer, DENVER Gordon, DENVER Cox, DENVER Campbell    BP and Weight Check Visit    Pt is here for a BP check and monitor placement. Patient reports having dizziness and fatigue has increased over the last couple weeks. Vitals:    21 1436 21 1443 21 1444   BP: 122/78 130/74 118/72   Site: Left Upper Arm Left Upper Arm Left Upper Arm   Position: Sitting Supine Standing   Cuff Size: Medium Adult Medium Adult Medium Adult   Pulse: 74 70 80   Weight: 210 lb 3.2 oz (95.3 kg)     Height: 5' 7\" (1.702 m)         Wt Readings from Last 3 Encounters:   21 210 lb 3.2 oz (95.3 kg)   21 213 lb (96.6 kg)   20 187 lb 12.8 oz (85.2 kg)        Plan for pt is to continue current medications. Will reevaluate symptoms after 2-week monitor results. Follow up in 1 month. Pt is to report any dizziness or syncope to the office        Electronically signed by Saranya Jacobo.  DENVER Carvalho - CNP on 2021 at 3:04 PM

## 2021-04-12 NOTE — PROGRESS NOTES
14 days e-cardio monitor placed.  # [id number]. Instructed patient on how to record the event and to call monitoring center at [phone] if any problems arise. Instructed patient to disconnect the lead wires from the electrodes before bathing or showering and reattach them afterwards. Instructed patient that the electrodes should be changed every 3 days or if they no longer adhere to the skin. Patient to mail package after the monitor has ended. Patient verbalized understanding.

## 2021-04-14 ENCOUNTER — TELEPHONE (OUTPATIENT)
Dept: CARDIOLOGY CLINIC | Age: 66
End: 2021-04-14

## 2021-04-14 NOTE — TELEPHONE ENCOUNTER
Patient having second cataract surgery, unsure what to do with her event monitor. Advised that she could wear it or leave at home and put back on afterward.

## 2021-05-24 ENCOUNTER — TELEPHONE (OUTPATIENT)
Dept: CARDIOLOGY CLINIC | Age: 66
End: 2021-05-24

## 2021-05-24 ENCOUNTER — OFFICE VISIT (OUTPATIENT)
Dept: CARDIOLOGY CLINIC | Age: 66
End: 2021-05-24
Payer: COMMERCIAL

## 2021-05-24 VITALS
HEART RATE: 64 BPM | HEIGHT: 67 IN | SYSTOLIC BLOOD PRESSURE: 134 MMHG | BODY MASS INDEX: 34.53 KG/M2 | DIASTOLIC BLOOD PRESSURE: 74 MMHG | WEIGHT: 220 LBS

## 2021-05-24 DIAGNOSIS — R42 DIZZINESS: ICD-10-CM

## 2021-05-24 DIAGNOSIS — E78.2 MIXED HYPERLIPIDEMIA: ICD-10-CM

## 2021-05-24 DIAGNOSIS — I25.10 CORONARY ARTERY DISEASE INVOLVING NATIVE CORONARY ARTERY OF NATIVE HEART WITHOUT ANGINA PECTORIS: Primary | ICD-10-CM

## 2021-05-24 DIAGNOSIS — I10 ESSENTIAL HYPERTENSION: ICD-10-CM

## 2021-05-24 PROCEDURE — 99214 OFFICE O/P EST MOD 30 MIN: CPT | Performed by: NURSE PRACTITIONER

## 2021-05-24 PROCEDURE — 1123F ACP DISCUSS/DSCN MKR DOCD: CPT | Performed by: NURSE PRACTITIONER

## 2021-05-24 PROCEDURE — G8427 DOCREV CUR MEDS BY ELIG CLIN: HCPCS | Performed by: NURSE PRACTITIONER

## 2021-05-24 PROCEDURE — 3017F COLORECTAL CA SCREEN DOC REV: CPT | Performed by: NURSE PRACTITIONER

## 2021-05-24 PROCEDURE — 1090F PRES/ABSN URINE INCON ASSESS: CPT | Performed by: NURSE PRACTITIONER

## 2021-05-24 PROCEDURE — 1036F TOBACCO NON-USER: CPT | Performed by: NURSE PRACTITIONER

## 2021-05-24 PROCEDURE — 4040F PNEUMOC VAC/ADMIN/RCVD: CPT | Performed by: NURSE PRACTITIONER

## 2021-05-24 PROCEDURE — G8400 PT W/DXA NO RESULTS DOC: HCPCS | Performed by: NURSE PRACTITIONER

## 2021-05-24 PROCEDURE — G8417 CALC BMI ABV UP PARAM F/U: HCPCS | Performed by: NURSE PRACTITIONER

## 2021-05-24 RX ORDER — METOPROLOL SUCCINATE 25 MG/1
25 TABLET, EXTENDED RELEASE ORAL DAILY
Qty: 30 TABLET | Refills: 3 | Status: SHIPPED | OUTPATIENT
Start: 2021-05-24 | End: 2022-01-10 | Stop reason: SDUPTHER

## 2021-05-24 RX ORDER — LISINOPRIL 10 MG/1
10 TABLET ORAL DAILY
Qty: 30 TABLET | Refills: 3 | Status: SHIPPED | OUTPATIENT
Start: 2021-05-24 | End: 2021-09-13

## 2021-05-24 RX ORDER — BUSPIRONE HYDROCHLORIDE 10 MG/1
15 TABLET ORAL 2 TIMES DAILY
COMMUNITY
Start: 2021-04-30 | End: 2021-09-13

## 2021-05-24 NOTE — ASSESSMENT & PLAN NOTE
-Patient recently wore monitor to evaluate occasional palpitations and dizziness. Norvasc was also stopped due to mild hypotension. Monitor reviewed and no significant arrhythmias seen. Symptoms usually correlated with sinus bradycardia rate of 50-60. Will change Lopressor to Toprol XL 25 mg daily.

## 2021-05-24 NOTE — ASSESSMENT & PLAN NOTE
-PCI of LAD and 2012 and RCA in 2013 recent stress test and echo were negative for ischemia or any significant structural abnormalities.     -Continue with lisinopril 10 mg daily, Toprol XL 25 mg  daily, Zocor 20 mg daily, and Maxide 75-50 mg daily as needed.

## 2021-05-24 NOTE — TELEPHONE ENCOUNTER
Patient calling to see if she need to keep today's appointment.   Wanted to make sure Rey received all the information that she needed to go over in the appointment

## 2021-05-24 NOTE — ASSESSMENT & PLAN NOTE
-Stable, recently had Norvasc discontinued due to symptomatic hypotension. Symptoms have somewhat resolved. Noted to have some mild sinus bradycardia on monitor which was associated with symptoms. Will change to Toprol XL 25 mg daily.

## 2021-05-24 NOTE — PROGRESS NOTES
[facility]  [address]  Phone: [phone]    Fax [phone]                  Tay Laurent MD, Jose Arreola MD, Hector Landry MD, MD Izabel Poole MD Kathrynn Leyland, MD Rollene Grizzle, APRARTIS Bryanub, APRN  Luann Mckenna, APRN     Goldie Stephenson, APRN        Cardiology Progress Note      5/24/2021    RE: Lo Trevino  ([birth year])                             Primary cardiologist: Dr. Izabel Cordova       Subjective:  CC:   1. Coronary artery disease involving native coronary artery of native heart without angina pectoris    2. Essential hypertension    3. Dizziness    4. Mixed hyperlipidemia        HPI: Lo Trevino, who is a  76y.o. year old female with a past medical history as listed below. Patient presents to the office for follow up on CAD, HTN, dizziness, and hyperlipidemia. Recently complained of worsening dizziness. Patient more Holter monitor which did not show any significant abnormalities but noted to have symptoms associated with sinus bradycardia. Recent stress test and echo normal. Patient is  an active female who walks regularly. Patient is  compliant with medications. Patient denies any chest pain, shortness of breath, syncope, or palpitations. Past Medical History:   Diagnosis Date    Anxiety     CAD (coronary artery disease)     Chest pain     Depression     Essential hypertension 4/2/2021    GERD (gastroesophageal reflux disease)     H/O cardiovascular stress test     7/26/12-No scintigraphic evidence of inducible myocardial ischemia. Global Left ventricular sytolic function normal. Rest EF 64%. No ECG changes. Unremarkable pharmacological stress test. Normal Myocardial Perfusion Study. 3/30/11- EF70% Normal Myocardial Perfusion study.  2/18/2010 EF =>55%;1/2009 EF 65%, 1/2008, 1/2007, 5/2006, 2/2006 EF70%    H/O Doppler ultrasound 03/03/2008     Carotid promethazine (PHENERGAN) 25 MG tablet promethazine 25 mg tablet   TAKE 1 TABLET BY MOUTH TWICE A DAY AS NEEDED for dizziness      simvastatin (ZOCOR) 20 MG tablet Take 1 tablet by mouth nightly 90 tablet 3    famotidine (PEPCID) 10 MG tablet Take 10 mg by mouth 2 times daily      lidocaine-prilocaine (EMLA) 2.5-2.5 % cream Apply topically as needed. 1 Tube 3    diphenhydrAMINE (BENADRYL) 50 MG capsule 50 mg 1 hour prior to procedure (Patient taking differently: No sig reported) 2 capsule 0    nitroGLYCERIN (NITROSTAT) 0.4 MG SL tablet Place 1 tablet under the tongue every 5 minutes as needed for Chest pain 25 tablet 3    VENTOLIN  (90 Base) MCG/ACT inhaler INHALE 2 PUFFS INTO THE LUNGS DAILY AS NEEDED  3    pantoprazole (PROTONIX) 40 MG tablet TAKE 1 TABLET BY MOUTH EVERY DAY  2    citalopram (CELEXA) 20 MG tablet       dicyclomine (BENTYL) 20 MG tablet Take 20 mg by mouth every 6 hours       loratadine (CLARITIN) 10 MG tablet Take 10 mg by mouth daily.  aspirin EC 81 MG EC tablet Take 1 tablet by mouth daily. 30 tablet 3    triamterene-hydrochlorothiazide (MAXZIDE) 75-50 MG per tablet Take 1 tablet by mouth as needed       busPIRone (BUSPAR) 7.5 MG tablet Take 7.5 mg by mouth daily      meclizine (ANTIVERT) 12.5 MG tablet TAKE 1 TABLET BY MOUTH EVERY 6 HOURS AS NEEDED FOR DIZZINESS (Patient not taking: Reported on 5/24/2021)  2     No current facility-administered medications for this visit. Review of Systems:  Review of Systems   Neurological: Positive for dizziness. All other systems reviewed and are negative. Objective:      Physical Exam:  /74   Pulse 64   Ht 5' 7\" (1.702 m)   Wt 220 lb (99.8 kg)   BMI 34.46 kg/m²   Wt Readings from Last 3 Encounters:   05/24/21 220 lb (99.8 kg)   04/12/21 210 lb 3.2 oz (95.3 kg)   02/22/21 213 lb (96.6 kg)     Body mass index is 34.46 kg/m².     Physical exam:  Physical Exam  Constitutional:       Appearance: She is mg daily as needed. Dizziness  -Patient recently wore monitor to evaluate occasional palpitations and dizziness. Norvasc was also stopped due to mild hypotension. Monitor reviewed and no significant arrhythmias seen. Symptoms usually correlated with sinus bradycardia rate of 50-60. Will change Lopressor to Toprol XL 25 mg daily. Essential hypertension  -Stable, recently had Norvasc discontinued due to symptomatic hypotension. Symptoms have somewhat resolved. Noted to have some mild sinus bradycardia on monitor which was associated with symptoms. Will change to Toprol XL 25 mg daily. Mixed hyperlipidemia  -At or near goal NA    -No recent labs available-lab slip given   -She is to continue current medications (Zocor) Hepatic function panel WNL. No abdominal pain. No myalgias.     -The nature of cardiac risk has been fully discussed with this patient. I have made her aware of her LDL target goal given her cardiovascular risk analysis. I have discussed the appropriate diet. The need for lifelong compliance in order to reduce risk is stressed. A regular exercise program is recommended to help achieve and maintain normal body weight, fitness and improve lipid balance. A written copy of a low fat, low cholesterol diet has been given to the patient. Patient seen, interviewed and examined. Testing was reviewed. Patient is encouraged to exercise even a brisk walk for 30 minutes at least 3 to 4 times a week. Lifestyle and risk factor modificatons discussed. Various goals are discussed and questions answered. Continue current medications. Appropriate prescriptions are addressed. Questions answered and patient verbalizes understanding. Call for any problems, questions, or concerns. Pt is to follow up in 3 months for Cardiac management    Electronically signed by Amanda Estrella.  DENVER Mejia CNP on 5/24/2021 at 11:49 AM

## 2021-05-24 NOTE — ASSESSMENT & PLAN NOTE
-At or near goal NA    -No recent labs available-lab slip given   -She is to continue current medications (Zocor) Hepatic function panel WNL. No abdominal pain. No myalgias.     -The nature of cardiac risk has been fully discussed with this patient. I have made her aware of her LDL target goal given her cardiovascular risk analysis. I have discussed the appropriate diet. The need for lifelong compliance in order to reduce risk is stressed. A regular exercise program is recommended to help achieve and maintain normal body weight, fitness and improve lipid balance. A written copy of a low fat, low cholesterol diet has been given to the patient.

## 2021-05-27 ENCOUNTER — TELEPHONE (OUTPATIENT)
Dept: CARDIOLOGY CLINIC | Age: 66
End: 2021-05-27

## 2021-08-09 ENCOUNTER — OFFICE VISIT (OUTPATIENT)
Dept: CARDIOLOGY CLINIC | Age: 66
End: 2021-08-09
Payer: COMMERCIAL

## 2021-08-09 VITALS
WEIGHT: 234.2 LBS | HEIGHT: 67 IN | BODY MASS INDEX: 36.76 KG/M2 | SYSTOLIC BLOOD PRESSURE: 130 MMHG | HEART RATE: 66 BPM | DIASTOLIC BLOOD PRESSURE: 72 MMHG

## 2021-08-09 DIAGNOSIS — R94.09 ABNORMAL TILT TABLE TEST: Primary | ICD-10-CM

## 2021-08-09 PROCEDURE — 4040F PNEUMOC VAC/ADMIN/RCVD: CPT | Performed by: INTERNAL MEDICINE

## 2021-08-09 PROCEDURE — G8427 DOCREV CUR MEDS BY ELIG CLIN: HCPCS | Performed by: INTERNAL MEDICINE

## 2021-08-09 PROCEDURE — G8400 PT W/DXA NO RESULTS DOC: HCPCS | Performed by: INTERNAL MEDICINE

## 2021-08-09 PROCEDURE — 1090F PRES/ABSN URINE INCON ASSESS: CPT | Performed by: INTERNAL MEDICINE

## 2021-08-09 PROCEDURE — 1123F ACP DISCUSS/DSCN MKR DOCD: CPT | Performed by: INTERNAL MEDICINE

## 2021-08-09 PROCEDURE — 1036F TOBACCO NON-USER: CPT | Performed by: INTERNAL MEDICINE

## 2021-08-09 PROCEDURE — 99214 OFFICE O/P EST MOD 30 MIN: CPT | Performed by: INTERNAL MEDICINE

## 2021-08-09 PROCEDURE — 3017F COLORECTAL CA SCREEN DOC REV: CPT | Performed by: INTERNAL MEDICINE

## 2021-08-09 PROCEDURE — G8417 CALC BMI ABV UP PARAM F/U: HCPCS | Performed by: INTERNAL MEDICINE

## 2021-08-09 NOTE — LETTER
[facility]     Dr. Apoorva Kat     Tilt Table Procedure     Patient Name: Margie Schmidt   : 89   MRN# [id number]     Date of Procedure: 21 Time: 9 Arrival Time: [address]: [facility])     Call to [place] at [phone] 2 days before your procedure    X Please have blood work and chest-x-ray done 2 to 3 days before procedure at [facility]. X Please have COVID test done     X Please do not have anything by mouth after midnight prior to or 8 hours before  the procedure. X You will need to arrive at the hospital 1 hour prior to the procedure. When you arrive  at the hospital please go to the registration desk. X You will need to arrange for someone to drive you home after the procedure. X Please wear comfortable clothing for the procedure. X If you are taking HCTZ (Hydrochlorothiazide) MAXZIDE please do not take  it the morning before your procedure. Patient Signature:____________________________ Staff Signature: Jalen Tijerina RN                           [place] ([facility]     Dr. Apoorva Kat      Tilt Table Test   A tilt table test is used to check people who have fainted or who often feel lightheaded. The results help your doctor know the cause of your fainting or feeling lightheaded. The test uses a special table that slowly tilts you to an upright position. It checks how your body responds when you change positions. The test will take about an hour. It may take longer if you get medicine to speed up your heart during the test.  Why is this test done? This test checks what causes your symptoms by monitoring them while changing your position. Your doctor can see if you faint or feel lightheaded because of problems with your heart rate or blood pressure. When people move from a lying position to an upright one, their blood pressure normally drops.  But the body adjusts to this. Your nervous system senses changes in body position and controls your heart rate and blood pressure. If the nervous system doesn't work properly, you might feel lightheaded or faint. This can happen if your blood pressure stays too low. Your heart rate also may slow down or speed up. You feel lightheaded because your brain is not getting a normal amount of blood for a short time. This problem is called syncope. Syncope might happen during the test when you change to an upright position. During the Test: The test is usually done in a hospital. You will have small patches or pads attached to your skin. These are sensors that monitor your heart. You will also have a blood pressure cuff on your arm. And you may have an IV. You will lie flat on a table that can tilt you up to almost a standing position. You will be strapped securely to the table. Your heart rate and blood pressure are checked regularly as the table is tilted up. You will be asked if you feel any symptoms like nausea, sweating, dizziness, or an abnormal heartbeat. If you don't have any symptoms, you may be given medicine to speed up your heart rate. Then you will be checked for symptoms again. If you faint during the test, the table will be returned to a flat position. You will be checked closely and taken care of right away by your medical team. Most people wake up right away. Results of the test: The test result is normal if your blood pressure stays stable during the test and you do not feel lightheaded or faint. The test result is not normal if your blood pressure drops and you feel lightheaded or faint. These symptoms might happen because of a slow heart rate. After the Test: Your heart rate and blood pressure will be checked before you go home. You may need to have someone drive you home after the test. You can probably go back to your usual activities right away.  But some people feel a little tired or nauseated  Follow-up care is a key part of your treatment and safety. Be sure to make and go to all appointments, and call your doctor if you are having problems. It's also a good idea to keep a list of the medicines you take. Ask your doctor when you can expect to have your test results. Karen Taylor              Patient Name: Seema Jim   : 1955  MRN# [id number]      DATE OF PROCEDURE: 21      DX: Z01.810             Tilt Table Procedure           X BMP        X MAGNESIUM       ? PLEASE CALL ABNORMAL RESULTS TO THE  PHYSICIAN? ATTENTION PATIENT: Pretesting is to be done before the cath. You do not have to fast for the lab work. You must go to the [facility]   behind the [facility] at [address] [place]. to have this lab work done. Phone: [phone] Hours: 7:00 am to 5:00 pm           PHYSICIANS SIGNATURE________________________DATE/TIME___________________                                             *This consent is applicable for 30 days following patient signature*    PROCEDURAL INFORMED [address] / PROCEDURE    I (We), Seema Jim authorize, Dr. Taylor Taylor    and such assistants as may be selected by him/her, to perform the following operation/procedures:    Tilt Table Procedure    Note: If unable to obtain consent prior to an emergent procedure, document the emergent reason in the medical record. This procedure has been explained to my (our) satisfaction and included in the explanation was:   A) the intended benefit, nature, and extent of the procedure to be performed;   B) the significant risks involved and the probability of success;   C) alternative procedures and methods of treatment;   D) the dangers and probable consequences of such alternatives (including no procedure or treatment);    E) the expected consequences of the procedure on my future health;   F) whether other qualified individuals would be performing important surgical tasks and / or whether  would be present to advise or support the procedure. I (we) understand that there are other risks of infection and other serious complications in the pre-operative/procedural and postoperative/procedural stages of my (our) care. I (we) have asked all of the questions which I (we) thought were important in deciding whether or not to undergo treatment or diagnosis. These questions have been answered to my (our) satisfaction. I (we) understand that no assurance can be given that the procedure will be a success, and no guarantee or warranty of success has been given to me (us). 2. It has been explained to me (us) that during the course of the operation/procedure, unforeseen conditions may be revealed that necessitate extension of the original procedure(s) or different procedure(s) than those set forth in Paragraph 1. I (we) authorize and request that the above-named physician, his/her assistants or his/her designees, perform procedures as necessary and desirable if deemed to be in my (our) best interest.     3. I acknowledge that other health care personnel may be observing this procedure for the purpose of medical education or other specified purposes as may be necessary as requested and/or approved by my (our) physician. 4. I (we) consent to the disposal by the hospital Pathologist of the removed tissue, parts or organs in accordance with hospital policy. 5. I do_____ do not______ consent to the use of a local infiltration pain blocking agent that will be used by my provider/surgical provider to help alleviate pain during my procedure. 6. I do_____ do not_____ consent to an emergent blood transfusion in the case of a life-threatening situation that requires blood components to be administered. This consent is valid for 24 hours from the beginning of the procedure.      7. This patient does _____ or does not

## 2021-08-09 NOTE — PROGRESS NOTES
Patient here in office & educated on SUNI for Dx: DIZZINESS, scheduled for 8/18/21 @ 9, w/arrival @ 8, @ [facility]. Risks explained; & consents signed. Pre-admission orders given to pt for labs which are due  Rebeca Miranda @ [facility]. Instructions given to pt to: amada LAINEZ after midnight the night before procedure. Patient to call hospital @ [phone] to pre-register. May take morning meds the morning of procedure. Patient was notified that procedure could be delayed due to an emergency. Patient voiced understanding. Copies of consent, pre-testing orders, & info. sheet scanned into media.

## 2021-08-09 NOTE — PROGRESS NOTES
Cary Newby MD        OFFICE  FOLLOWUP NOTE    Chief complaints: patient is here for management of CAD, HTN,vertigo, chest pain. ? lichen planus    Subjective: patient thinks she has vasovagal epiodes    HPI Bridgette Heath is a 76 y. o.year old who  has a past medical history of Anxiety, CAD (coronary artery disease), Chest pain, Depression, Essential hypertension, GERD (gastroesophageal reflux disease), H/O cardiovascular stress test, H/O Doppler ultrasound, H/O echocardiogram, H/O echocardiogram, H/O exercise stress test, H/O percutaneous left heart catheterization, Hx of cardiovascular stress test, Hx of cardiovascular stress test, Hx of echocardiogram, Hyperlipidemia, Orthostatic hypotension, Other activity(E029.9), Post PTCA, and Post PTCA. and presents for management of CAD, HTN,vertigo, chest pain. ? lichen planus which are well controlled  She feels she does not remember clearly her thoughts, she thinks blood cirulation to brain is not good, she was told to have vasovagal episodes, he is sitting here an]d putting fan on her face . She has been sweating./    Current Outpatient Medications   Medication Sig Dispense Refill    lisinopril (PRINIVIL;ZESTRIL) 10 MG tablet Take 1 tablet by mouth daily 10mg daily 30 tablet 3    metoprolol succinate (TOPROL XL) 25 MG extended release tablet Take 1 tablet by mouth daily 30 tablet 3    busPIRone (BUSPAR) 10 MG tablet Take 15 mg by mouth 2 times daily       promethazine (PHENERGAN) 25 MG tablet promethazine 25 mg tablet   TAKE 1 TABLET BY MOUTH TWICE A DAY AS NEEDED for dizziness      simvastatin (ZOCOR) 20 MG tablet Take 1 tablet by mouth nightly 90 tablet 3    famotidine (PEPCID) 10 MG tablet Take 10 mg by mouth 2 times daily      lidocaine-prilocaine (EMLA) 2.5-2.5 % cream Apply topically as needed.  1 Tube 3    diphenhydrAMINE (BENADRYL) 50 MG capsule 50 mg 1 hour prior to procedure (Patient taking differently: No sig reported) 2 capsule 0    nitroGLYCERIN (NITROSTAT) 0.4 MG SL tablet Place 1 tablet under the tongue every 5 minutes as needed for Chest pain 25 tablet 3    VENTOLIN  (90 Base) MCG/ACT inhaler INHALE 2 PUFFS INTO THE LUNGS DAILY AS NEEDED  3    pantoprazole (PROTONIX) 40 MG tablet TAKE 1 TABLET BY MOUTH EVERY DAY  2    citalopram (CELEXA) 20 MG tablet       dicyclomine (BENTYL) 20 MG tablet Take 20 mg by mouth every 6 hours       loratadine (CLARITIN) 10 MG tablet Take 10 mg by mouth daily.  aspirin EC 81 MG EC tablet Take 1 tablet by mouth daily. 30 tablet 3    triamterene-hydrochlorothiazide (MAXZIDE) 75-50 MG per tablet Take 1 tablet by mouth as needed       meclizine (ANTIVERT) 12.5 MG tablet TAKE 1 TABLET BY MOUTH EVERY 6 HOURS AS NEEDED FOR DIZZINESS (Patient not taking: Reported on 5/24/2021)  2     No current facility-administered medications for this visit. Allergies: Pregabalin, Hydromorphone hcl, Codeine, Aspirin, Diatrizoate, Gabapentin, Ibuprofen, and Iodine  Past Medical History:   Diagnosis Date    Anxiety     CAD (coronary artery disease)     Chest pain     Depression     Essential hypertension 4/2/2021    GERD (gastroesophageal reflux disease)     H/O cardiovascular stress test     7/26/12-No scintigraphic evidence of inducible myocardial ischemia. Global Left ventricular sytolic function normal. Rest EF 64%. No ECG changes. Unremarkable pharmacological stress test. Normal Myocardial Perfusion Study. 3/30/11- EF70% Normal Myocardial Perfusion study. 2/18/2010 EF =>55%;1/2009 EF 65%, 1/2008, 1/2007, 5/2006, 2/2006 EF70%    H/O Doppler ultrasound 03/03/2008     Carotid Report- No Significant atherosclerotic plaque noted in the right internal carotid artery. The Doppler flow velocities w/in FREDERIC are within normal limits. There is intimal thinckening but no significant atherosclerotic plaque noted in LICA. The Dopple flow velocities w/in LICA are Within Normal Limits.     H/O echocardiogram 7/26/12- EF=>55%. Left ventricular systolic function is [id number].8/[id number]/4013 EF =>55%, 12/2010 EF55%;  2/14/2008    H/O echocardiogram 07/17/2017    EF >55%    H/O exercise stress test 06/22/2018    treadmill    H/O percutaneous left heart catheterization 03/11/2014    EF 55% Left anterior descending artery has patent stent, proximal LAD has eccentric lesion of 30-40% unchanged from last time, The right coronary artery is a dominant vessel with abberant take off, has 40-50% mid RCA stenosis, proximal stent is patent. No evidence of hemodynamically significant coronary artery disease, I have tried to do FFR of RCA, however, because of  aberrant take off, could no    Hx of cardiovascular stress test 05/14/2015    cardiolite-normal,EF63%    Hx of cardiovascular stress test 03/10/2020    Normal study    Hx of echocardiogram 03/15/2016    EF 55%, normal LV, trivial MR & TR, mild aortic insufficiency.  Hyperlipidemia     Orthostatic hypotension     Other activity(E029.9) 09/02/2008    48 HR Holter Monitor- Predominat rhythm is sinus rhythm. No significant ectopy noted.  Post PTCA 08/23/2013    stent LAD    Post PTCA 08/13/2012    RCA 99% reduced to 0 w/ PTCA and stent w. 2.75 X 23 Xience. Left main coronary artery has no signif. dis. LAD artery has mild disease. CX artery has no signif. disease. EF 55%     Past Surgical History:   Procedure Laterality Date    CARDIAC CATHETERIZATION      4/24/2006-Left heart cath-    CARDIAC CATHETERIZATION  03/11/2014    EF 55% Left anterior descending artery has patent stent, proximal LAD has eccentric lesion of 30-40% unchanged from last time, The right coronary artery is a dominant vessel with abberant take off, has 40-50% mid RCA stenosis, proximal stent is patent.  No evidence of hemodynamically significant coronary artery disease, I have tried to do FFR of RCA, however, because of  aberrant take off, could no    CORONARY ANGIOPLASTY WITH STENT PLACEMENT  8/23/13 8/23/13 LAD 7/2012; 4/25/2006- PTCA w/ stent-> RCA    EYE SURGERY      HERNIA REPAIR  03/08/2017    HYSTERECTOMY      TONSILLECTOMY AND ADENOIDECTOMY      ULNAR TUNNEL RELEASE  11/2006    Ulnar nerve tranfer Left elbow    WRIST SURGERY  7/2008    Left wrist     Family History   Problem Relation Age of Onset    Diabetes Sister     Heart Disease Sister     High Blood Pressure Sister     Diabetes Brother     Heart Disease Brother     High Blood Pressure Brother     Cancer Maternal Aunt     Cancer Paternal Aunt     Diabetes Brother     High Blood Pressure Brother      Social History     Tobacco Use    Smoking status: Former Smoker     Packs/day: 0.25     Types: Cigarettes     Start date: 6/1/2015    Smokeless tobacco: Never Used   Substance Use Topics    Alcohol use: Yes     Comment: occ 5 x year      [unfilled]  Review of Systems:   · Constitutional: No Fever or Weight Loss   · Eyes: No Decreased Vision  · ENT: No Headaches, Hearing Loss or Vertigo  · Cardiovascular: No chest pain, dyspnea on exertion, palpitations or loss of consciousness  · Respiratory: No cough or wheezing    · Gastrointestinal: No abdominal pain, appetite loss, blood in stools, constipation, diarrhea or heartburn  · Genitourinary: No dysuria, trouble voiding, or hematuria  · Musculoskeletal:  No gait disturbance, weakness or joint complaints  · Integumentary: No rash or pruritis  · Neurological: No TIA or stroke symptoms  · Psychiatric: No anxiety or depression  · Endocrine: No malaise, fatigue or temperature intolerance  · Hematologic/Lymphatic: No bleeding problems, blood clots or swollen lymph nodes  · Allergic/Immunologic: No nasal congestion or hives  All systems negative except as marked.    Objective:  /72   Pulse 66   Ht 5' 7\" (1.702 m)   Wt 234 lb 3.2 oz (106.2 kg)   BMI 36.68 kg/m²   Wt Readings from Last 3 Encounters:   08/09/21 234 lb 3.2 oz (106.2 kg)   05/24/21 220 lb (99.8 kg)   04/12/21 210 lb 3.2 oz (95.3 kg)     Body mass index is 36.68 kg/m². GENERAL - Alert, oriented, pleasant, in no apparent distress,normal grooming  HEENT - pupils are intact, cornea intact, conjunctive normal color, ears are normal in exam,  Neck - Supple. No jugular venous distention noted. No carotid bruits, no apical -carotid delay  Respiratory:  Normal breath sounds, No respiratory distress, No wheezing, No chest tenderness. ,no use of accessory muscles, diaphragm movement is normal  Cardiovascular: (PMI) apex non displaced,no lifts no thrills, no s3,no s4, Normal heart rate, Normal rhythm, No murmurs, No rubs, No gallops. Carotid arteries pulse and amplitude are normal no bruit, no abdominal bruit noted ( normal abdominal aorta ausculation),   Extremities - No cyanosis, clubbing, or significant edema, no varicose veins    Abdomen - No masses, tenderness, or organomegaly, no hepato-splenomegally, no bruits  Musculoskeletal - No significant edema, no kyphosis or scoliosis, no deformity in any extremity noted, muscle strength and tone are normal  Skin: no ulcer,no scar,no stasis dermatitis   Neurologic - alert oriented times 3,Cranial nerves II through XII are grossly intact. There were no gross focal neurologic abnormalities. Psychiatric: normal mood and affect    Lab Results   Component Value Date    TROPONINI <0.006 03/10/2014     BNP:    Lab Results   Component Value Date    BNP 33 03/09/2014     PT/INR:  No results found for: PTINR  No results found for: LABA1C  Lab Results   Component Value Date    CHOL 187 04/17/2013    TRIG 79 04/17/2013    HDL 79 (A) 04/17/2013    LDLCALC 92 04/17/2013     Lab Results   Component Value Date    ALT 8 (L) 06/17/2016    AST 13 (L) 06/17/2016     TSH:  No results found for: TSH    Impression:  Mahnaz Miller is a 76 y. o.year old who  has a past medical history of Anxiety, CAD (coronary artery disease), Chest pain, Depression, Essential hypertension, GERD (gastroesophageal reflux disease), H/O cardiovascular

## 2021-08-16 ENCOUNTER — HOSPITAL ENCOUNTER (OUTPATIENT)
Age: 66
Discharge: HOME | End: 2021-08-16
Payer: COMMERCIAL

## 2021-08-16 DIAGNOSIS — Z01.810 PRE-OPERATIVE CARDIOVASCULAR EXAMINATION: Primary | ICD-10-CM

## 2021-08-16 LAB
ANION GAP SERPL CALCULATED.3IONS-SCNC: 9 MMOL/L (ref 4–16)
BUN BLDV-MCNC: 12 MG/DL (ref 6–23)
CALCIUM SERPL-MCNC: 8.8 MG/DL (ref 8.3–10.6)
CHLORIDE BLD-SCNC: 109 MMOL/L (ref 99–110)
CO2: 26 MMOL/L (ref 21–32)
CREAT SERPL-MCNC: 0.6 MG/DL (ref 0.6–1.1)
GFR AFRICAN AMERICAN: >60 ML/MIN/1.73M2
GFR NON-AFRICAN AMERICAN: >60 ML/MIN/1.73M2
GLUCOSE BLD-MCNC: 97 MG/DL (ref 70–99)
MAGNESIUM: 2.1 MG/DL (ref 1.8–2.4)
POTASSIUM SERPL-SCNC: 4.4 MMOL/L (ref 3.5–5.1)
SODIUM BLD-SCNC: 144 MMOL/L (ref 135–145)

## 2021-08-16 PROCEDURE — 83735 ASSAY OF MAGNESIUM: CPT

## 2021-08-16 PROCEDURE — 80048 BASIC METABOLIC PNL TOTAL CA: CPT

## 2021-08-16 PROCEDURE — 36415 COLL VENOUS BLD VENIPUNCTURE: CPT

## 2021-08-17 ENCOUNTER — TELEPHONE (OUTPATIENT)
Dept: CARDIOLOGY CLINIC | Age: 66
End: 2021-08-17

## 2021-08-18 ENCOUNTER — HOSPITAL ENCOUNTER (OUTPATIENT)
Dept: NON INVASIVE DIAGNOSTICS | Age: 66
Discharge: HOME | End: 2021-08-18
Payer: COMMERCIAL

## 2021-08-18 ENCOUNTER — HOSPITAL ENCOUNTER (OUTPATIENT)
Age: 66
Discharge: HOME | End: 2021-08-18
Payer: COMMERCIAL

## 2021-08-18 LAB
SARS-COV-2, NAAT: NOT DETECTED
SOURCE: NORMAL

## 2021-08-18 PROCEDURE — 93660 TILT TABLE EVALUATION: CPT

## 2021-08-18 PROCEDURE — 87635 SARS-COV-2 COVID-19 AMP PRB: CPT

## 2021-08-18 PROCEDURE — 7100000000 HC PACU RECOVERY - FIRST 15 MIN

## 2021-08-18 PROCEDURE — 93660 TILT TABLE EVALUATION: CPT | Performed by: INTERNAL MEDICINE

## 2021-08-18 PROCEDURE — 7100000001 HC PACU RECOVERY - ADDTL 15 MIN

## 2021-08-18 NOTE — PROCEDURES
Tilt table test       INDICATION:   SYNCOPE        After obtaining the informed consent pt brought to lab.     (please see the hemodynamic sheet for BP, HR and timings)  STAGE 1; (70 degree tilt)   Normal hemodynamic response. STAGE 2:   CSM bilaterally done, appropriate change in hemodynamics. STAGE 3:   NTG given S/L. No change in hemodynamics. TIME BP HR RR SPO2 COMMENTS   0911 189/83 55 10 100 Pt alert, oriented. Skin warm, dry. No complaints   0924 196/77 55 12 100 Table up denies dizziness   0926 200/76 53 16 99 No changes   0928 199/69 56 16 100 Denies dizziness   0930 193/77 53 18 98 No changes   0932 191/74 56 16 97 No complaints   0934 181/68 55 11 96 No complaints   0936 189/92 53 11 97 No complaints   0938 189/87 87 15 99 No complaints   0940 202/79 60 20 98 No complaints   0941   55 22 98 Left carotid massage   0942 186/82 56 22 98 Right carotid massage   0943 183/69 54 16 98 Nitro spray 1sl   0944 159/76 58 24 97 No complaints   0945 156/71 67 26 97 No complaints   0947 162/81 67 18 96 No complaints   0948 142/74 68 28 96 No complaints   0949 137/71 69 18 96 No complaints   0951 155/73 69 24 94 No complaints   0952 161/77 69 22 97 No complaints   0953 156/74 67 12 95 No complaints, tilt table down   0954 166/67 65 15 97 No change in assessment   0957 158/72 52 13 98 Complaining of slight H/A   1002 140/79 56 16 97 Up in chair, feeling better   1011 165/68 54 18 96 IV discontinued, discharge instructions given with understanding voiced       Impression:   Normal response. Plan:   Nonvascular workup.

## 2021-08-18 NOTE — PROGRESS NOTES
HEAD UP TILT TABLE TEST     Name: Micheline Cunningham  Date: 21  : 1955    MRN: [id number]  Physician: No att. providers found     Allergies: Allergies   Allergen Reactions    Pregabalin Shortness Of Breath    Hydromorphone Hcl Itching    Codeine Itching    Aspirin Rash     Needs to enteric coating      Diatrizoate Rash    Gabapentin Nausea And Vomiting    Ibuprofen Rash    Iodine Rash        Medications:   Prior to Visit Medications    Medication Sig Taking? Authorizing Provider   lisinopril (PRINIVIL;ZESTRIL) 10 MG tablet Take 1 tablet by mouth daily 10mg daily  DENVER Ledesma - CNP   metoprolol succinate (TOPROL XL) 25 MG extended release tablet Take 1 tablet by mouth daily  DENVER Ledesma CNP   busPIRone (BUSPAR) 10 MG tablet Take 15 mg by mouth 2 times daily   Historical Provider, MD   promethazine (PHENERGAN) 25 MG tablet promethazine 25 mg tablet   TAKE 1 TABLET BY MOUTH TWICE A DAY AS NEEDED for dizziness  Historical Provider, MD   simvastatin (ZOCOR) 20 MG tablet Take 1 tablet by mouth nightly  Jean Florian MD   famotidine (PEPCID) 10 MG tablet Take 10 mg by mouth 2 times daily  Historical Provider, MD   lidocaine-prilocaine (EMLA) 2.5-2.5 % cream Apply topically as needed.   Lenora Hollis MD   diphenhydrAMINE (BENADRYL) 50 MG capsule 50 mg 1 hour prior to procedure  Patient taking differently: No sig reported  Lenora Hollis MD   nitroGLYCERIN (NITROSTAT) 0.4 MG SL tablet Place 1 tablet under the tongue every 5 minutes as needed for Chest pain  Jean Florian MD   meclizine (ANTIVERT) 12.5 MG tablet TAKE 1 TABLET BY MOUTH EVERY 6 HOURS AS NEEDED FOR DIZZINESS  Patient not taking: Reported on 2021  Historical Provider, MD   VENTOLIN  (90 Base) MCG/ACT inhaler INHALE 2 PUFFS INTO THE LUNGS DAILY AS NEEDED  Historical Provider, MD   pantoprazole (PROTONIX) 40 MG tablet TAKE 1 TABLET BY MOUTH EVERY DAY  Historical Provider, MD citalopram (CELEXA) 20 MG tablet   Historical Provider, MD   dicyclomine (BENTYL) 20 MG tablet Take 20 mg by mouth every 6 hours   Historical Provider, MD   loratadine (CLARITIN) 10 MG tablet Take 10 mg by mouth daily. Historical Provider, MD   aspirin EC 81 MG EC tablet Take 1 tablet by mouth daily. Tristen Mariano MD   triamterene-hydrochlorothiazide (MAXZIDE) 75-50 MG per tablet Take 1 tablet by mouth as needed   Historical Provider, MD         Past Medical History:   Diagnosis Date    Anxiety     CAD (coronary artery disease)     Chest pain     Depression     Essential hypertension 4/2/2021    GERD (gastroesophageal reflux disease)     H/O cardiovascular stress test     7/26/12-No scintigraphic evidence of inducible myocardial ischemia. Global Left ventricular sytolic function normal. Rest EF 64%. No ECG changes. Unremarkable pharmacological stress test. Normal Myocardial Perfusion Study. 3/30/11- EF70% Normal Myocardial Perfusion study. 2/18/2010 EF =>55%;1/2009 EF 65%, 1/2008, 1/2007, 5/2006, 2/2006 EF70%    H/O Doppler ultrasound 03/03/2008     Carotid Report- No Significant atherosclerotic plaque noted in the right internal carotid artery. The Doppler flow velocities w/in FREDERIC are within normal limits. There is intimal thinckening but no significant atherosclerotic plaque noted in LICA. The Dopple flow velocities w/in LICA are Within Normal Limits.  H/O echocardiogram     7/26/12- EF=>55%. Left ventricular systolic function is TOCAAH.5/4/2436 EF =>55%, 12/2010 EF55%;  2/14/2008    H/O echocardiogram 07/17/2017    EF >55%    H/O exercise stress test 06/22/2018    treadmill    H/O percutaneous left heart catheterization 03/11/2014    EF 55% Left anterior descending artery has patent stent, proximal LAD has eccentric lesion of 30-40% unchanged from last time, The right coronary artery is a dominant vessel with abberant take off, has 40-50% mid RCA stenosis, proximal stent is patent.  No evidence of hemodynamically significant coronary artery disease, I have tried to do FFR of RCA, however, because of  aberrant take off, could no    Hx of cardiovascular stress test 05/14/2015    cardiolite-normal,EF63%    Hx of cardiovascular stress test 03/10/2020    Normal study    Hx of echocardiogram 03/15/2016    EF 55%, normal LV, trivial MR & TR, mild aortic insufficiency.  Hyperlipidemia     Orthostatic hypotension     Other activity(E029.9) 09/02/2008    48 HR Holter Monitor- Predominat rhythm is sinus rhythm. No significant ectopy noted.  Post PTCA 08/23/2013    stent LAD    Post PTCA 08/13/2012    RCA 99% reduced to 0 w/ PTCA and stent w. 2.75 X 23 Xience. Left main coronary artery has no signif. dis. LAD artery has mild disease. CX artery has no signif. disease. EF 55%       Past Surgical History:   Procedure Laterality Date    CARDIAC CATHETERIZATION      4/24/2006-Left heart cath-    CARDIAC CATHETERIZATION  03/11/2014    EF 55% Left anterior descending artery has patent stent, proximal LAD has eccentric lesion of 30-40% unchanged from last time, The right coronary artery is a dominant vessel with abberant take off, has 40-50% mid RCA stenosis, proximal stent is patent.  No evidence of hemodynamically significant coronary artery disease, I have tried to do FFR of RCA, however, because of  aberrant take off, could no    CORONARY ANGIOPLASTY WITH STENT PLACEMENT  8/23/13 8/23/13 LAD 7/2012; 4/25/2006- PTCA w/ stent-> RCA    EYE SURGERY      HERNIA REPAIR  03/08/2017    HYSTERECTOMY      TONSILLECTOMY AND ADENOIDECTOMY      ULNAR TUNNEL RELEASE  11/2006    Ulnar nerve tranfer Left elbow    WRIST SURGERY  7/2008    Left wrist       [x] Patient verbalizes understanding of procedure    [x]  Consent obtained  [x]  NPO X13 hours  [x]  IV established with # 22 Site right antecubital,  [x]  12 Lead EKG obtained   [x]  Electrolytes Na [x]  K [x]  CL [x]  CO2 [x]   Time: PDCJ:8-53-16    Nursing Notes/ Vital Signs/ LOC/ Skin Color  Alert and oriented x3 color good    TIME BP HR RR SPO2 COMMENTS   0911 189/83 55 10 100 Pt alert, oriented. Skin warm, dry.  No complaints   0924 196/77 55 12 100 Table up denies dizziness   0926 200/76 53 16 99 No changes   0928 199/69 56 16 100 Denies dizziness   0930 193/77 53 18 98 No changes   0932 191/74 56 16 97 No complaints   0934 181/68 55 11 96 No complaints   0936 189/92 53 11 97 No complaints   0938 189/87 87 15 99 No complaints   0940 202/79 60 20 98 No complaints   0941  55 22 98 Left carotid massage   0942 186/82 56 22 98 Right carotid massage   0943 183/69 54 16 98 Nitro spray 1sl   0944 159/76 58 24 97 No complaints   0945 156/71 67 26 97 No complaints   0947 162/81 67 18 96 No complaints   0948 142/74 68 28 96 No complaints   0949 137/71 69 18 96 No complaints   0951 155/73 69 24 94 No complaints   0952 161/77 69 22 97 No complaints   0953 156/74 67 12 95 No complaints, tilt table down   0954 166/67 65 15 97 No change in assessment   0957 158/72 52 13 98 Complaining of slight H/A   1002 140/79 56 16 97 Up in chair, feeling better   1011 165/68 54 18 96 IV discontinued, discharge instructions given with understanding voiced

## 2021-09-13 ENCOUNTER — OFFICE VISIT (OUTPATIENT)
Dept: CARDIOLOGY CLINIC | Age: 66
End: 2021-09-13
Payer: COMMERCIAL

## 2021-09-13 VITALS
BODY MASS INDEX: 36.66 KG/M2 | HEART RATE: 60 BPM | SYSTOLIC BLOOD PRESSURE: 144 MMHG | WEIGHT: 233.6 LBS | HEIGHT: 67 IN | DIASTOLIC BLOOD PRESSURE: 68 MMHG

## 2021-09-13 DIAGNOSIS — M79.89 LEG SWELLING: Primary | ICD-10-CM

## 2021-09-13 PROCEDURE — 3017F COLORECTAL CA SCREEN DOC REV: CPT | Performed by: INTERNAL MEDICINE

## 2021-09-13 PROCEDURE — G8427 DOCREV CUR MEDS BY ELIG CLIN: HCPCS | Performed by: INTERNAL MEDICINE

## 2021-09-13 PROCEDURE — 99214 OFFICE O/P EST MOD 30 MIN: CPT | Performed by: INTERNAL MEDICINE

## 2021-09-13 PROCEDURE — 4040F PNEUMOC VAC/ADMIN/RCVD: CPT | Performed by: INTERNAL MEDICINE

## 2021-09-13 PROCEDURE — 1090F PRES/ABSN URINE INCON ASSESS: CPT | Performed by: INTERNAL MEDICINE

## 2021-09-13 PROCEDURE — 1123F ACP DISCUSS/DSCN MKR DOCD: CPT | Performed by: INTERNAL MEDICINE

## 2021-09-13 PROCEDURE — G8417 CALC BMI ABV UP PARAM F/U: HCPCS | Performed by: INTERNAL MEDICINE

## 2021-09-13 PROCEDURE — 1036F TOBACCO NON-USER: CPT | Performed by: INTERNAL MEDICINE

## 2021-09-13 PROCEDURE — G8400 PT W/DXA NO RESULTS DOC: HCPCS | Performed by: INTERNAL MEDICINE

## 2021-09-13 RX ORDER — LISINOPRIL 20 MG/1
20 TABLET ORAL DAILY
Qty: 90 TABLET | Refills: 3 | Status: SHIPPED | OUTPATIENT
Start: 2021-09-13 | End: 2022-08-16 | Stop reason: SDUPTHER

## 2021-09-13 NOTE — PROGRESS NOTES
Yulissa Levy, MD        OFFICE  FOLLOWUP NOTE    Chief complaints: patient is here for management of CAD, HTN,vertigo, chest pain. ? lichen planus    F/u on tilt table test: NORMAL RESPONSE    Subjective: patient feels better, no chest pain, no shortness of breath, no dizziness, no palpitations    HPI Tim Howard is a 78 y. o.year old who  has a past medical history of Anxiety, CAD (coronary artery disease), Chest pain, Depression, Essential hypertension, GERD (gastroesophageal reflux disease), H/O cardiovascular stress test, H/O Doppler ultrasound, H/O echocardiogram, H/O echocardiogram, H/O exercise stress test, H/O percutaneous left heart catheterization, H/O tilt table evaluation, Hx of cardiovascular stress test, Hx of cardiovascular stress test, Hx of echocardiogram, Hyperlipidemia, Orthostatic hypotension, Other activity(E029.9), Post PTCA, and Post PTCA. and presents for management of CAD, HTN,vertigo, chest pain. ? lichen planus, which are well controlled      Current Outpatient Medications   Medication Sig Dispense Refill    lisinopril (PRINIVIL;ZESTRIL) 10 MG tablet Take 1 tablet by mouth daily 10mg daily 30 tablet 3    metoprolol succinate (TOPROL XL) 25 MG extended release tablet Take 1 tablet by mouth daily 30 tablet 3    promethazine (PHENERGAN) 25 MG tablet promethazine 25 mg tablet   TAKE 1 TABLET BY MOUTH TWICE A DAY AS NEEDED for dizziness      simvastatin (ZOCOR) 20 MG tablet Take 1 tablet by mouth nightly 90 tablet 3    famotidine (PEPCID) 10 MG tablet Take 10 mg by mouth 2 times daily      lidocaine-prilocaine (EMLA) 2.5-2.5 % cream Apply topically as needed.  1 Tube 3    diphenhydrAMINE (BENADRYL) 50 MG capsule 50 mg 1 hour prior to procedure (Patient taking differently: No sig reported) 2 capsule 0    nitroGLYCERIN (NITROSTAT) 0.4 MG SL tablet Place 1 tablet under the tongue every 5 minutes as needed for Chest pain 25 tablet 3    meclizine (ANTIVERT) 12.5 MG tablet TAKE 1 percutaneous left heart catheterization 03/11/2014    EF 55% Left anterior descending artery has patent stent, proximal LAD has eccentric lesion of 30-40% unchanged from last time, The right coronary artery is a dominant vessel with abberant take off, has 40-50% mid RCA stenosis, proximal stent is patent. No evidence of hemodynamically significant coronary artery disease, I have tried to do FFR of RCA, however, because of  aberrant take off, could no    H/O tilt table evaluation 08/18/2021    Normal response.  Hx of cardiovascular stress test 05/14/2015    cardiolite-normal,EF63%    Hx of cardiovascular stress test 03/10/2020    Normal study    Hx of echocardiogram 03/15/2016    EF 55%, normal LV, trivial MR & TR, mild aortic insufficiency.  Hyperlipidemia     Orthostatic hypotension     Other activity(E029.9) 09/02/2008    48 HR Holter Monitor- Predominat rhythm is sinus rhythm. No significant ectopy noted.  Post PTCA 08/23/2013    stent LAD    Post PTCA 08/13/2012    RCA 99% reduced to 0 w/ PTCA and stent w. 2.75 X 23 Xience. Left main coronary artery has no signif. dis. LAD artery has mild disease. CX artery has no signif. disease. EF 55%     Past Surgical History:   Procedure Laterality Date    CARDIAC CATHETERIZATION      4/24/2006-Left heart cath-    CARDIAC CATHETERIZATION  03/11/2014    EF 55% Left anterior descending artery has patent stent, proximal LAD has eccentric lesion of 30-40% unchanged from last time, The right coronary artery is a dominant vessel with abberant take off, has 40-50% mid RCA stenosis, proximal stent is patent.  No evidence of hemodynamically significant coronary artery disease, I have tried to do FFR of RCA, however, because of  aberrant take off, could no    CORONARY ANGIOPLASTY WITH STENT PLACEMENT  8/23/13 8/23/13 LAD 7/2012; 4/25/2006- PTCA w/ stent-> RCA    EYE SURGERY      HERNIA REPAIR  03/08/2017    HYSTERECTOMY      TONSILLECTOMY AND ADENOIDECTOMY  ULNAR TUNNEL RELEASE  11/2006    Ulnar nerve tranfer Left elbow    WRIST SURGERY  7/2008    Left wrist     Family History   Problem Relation Age of Onset    Diabetes Sister     Heart Disease Sister     High Blood Pressure Sister     Diabetes Brother     Heart Disease Brother     High Blood Pressure Brother     Cancer Maternal Aunt     Cancer Paternal Aunt     Diabetes Brother     High Blood Pressure Brother      Social History     Tobacco Use    Smoking status: Former Smoker     Packs/day: 0.25     Types: Cigarettes     Start date: 6/1/2015    Smokeless tobacco: Never Used   Substance Use Topics    Alcohol use: Yes     Comment: occ 5 x year      [unfilled]  Review of Systems:   · Constitutional: No Fever or Weight Loss   · Eyes: No Decreased Vision  · ENT: No Headaches, Hearing Loss or Vertigo  · Cardiovascular: No chest pain, dyspnea on exertion, palpitations or loss of consciousness  · Respiratory: No cough or wheezing    · Gastrointestinal: No abdominal pain, appetite loss, blood in stools, constipation, diarrhea or heartburn  · Genitourinary: No dysuria, trouble voiding, or hematuria  · Musculoskeletal:  No gait disturbance, weakness or joint complaints  · Integumentary: No rash or pruritis  · Neurological: No TIA or stroke symptoms  · Psychiatric: No anxiety or depression  · Endocrine: No malaise, fatigue or temperature intolerance  · Hematologic/Lymphatic: No bleeding problems, blood clots or swollen lymph nodes  · Allergic/Immunologic: No nasal congestion or hives  All systems negative except as marked. Objective:  BP (!) 144/68   Pulse 60   Ht 5' 7\" (1.702 m)   Wt 233 lb 9.6 oz (106 kg)   BMI 36.59 kg/m²   Wt Readings from Last 3 Encounters:   09/13/21 233 lb 9.6 oz (106 kg)   08/09/21 234 lb 3.2 oz (106.2 kg)   05/24/21 220 lb (99.8 kg)     Body mass index is 36.59 kg/m².   GENERAL - Alert, oriented, pleasant, in no apparent distress,normal grooming  HEENT - pupils are intact, cornea intact, conjunctive normal color, ears are normal in exam,  Neck - Supple. No jugular venous distention noted. No carotid bruits, no apical -carotid delay  Respiratory:  Normal breath sounds, No respiratory distress, No wheezing, No chest tenderness. ,no use of accessory muscles, diaphragm movement is normal  Cardiovascular: (PMI) apex non displaced,no lifts no thrills, no s3,no s4, Normal heart rate, Normal rhythm, No murmurs, No rubs, No gallops. Carotid arteries pulse and amplitude are normal no bruit, no abdominal bruit noted ( normal abdominal aorta ausculation),   Extremities - No cyanosis, clubbing, or significant edema, no varicose veins    Abdomen - No masses, tenderness, or organomegaly, no hepato-splenomegally, no bruits  Musculoskeletal + edema, no kyphosis or scoliosis, no deformity in any extremity noted, muscle strength and tone are normal  Skin: no ulcer,no scar,no stasis dermatitis   Neurologic - alert oriented times 3,Cranial nerves II through XII are grossly intact. There were no gross focal neurologic abnormalities. Psychiatric: normal mood and affect    Lab Results   Component Value Date    TROPONINI <0.006 03/10/2014     BNP:    Lab Results   Component Value Date    BNP 33 03/09/2014     PT/INR:  No results found for: PTINR  No results found for: LABA1C  Lab Results   Component Value Date    CHOL 187 04/17/2013    TRIG 79 04/17/2013    HDL 79 (A) 04/17/2013    LDLCALC 92 04/17/2013     Lab Results   Component Value Date    ALT 8 (L) 06/17/2016    AST 13 (L) 06/17/2016     TSH:  No results found for: TSH    Impression:  Matt Harris is a 78 y. o.year old who  has a past medical history of Anxiety, CAD (coronary artery disease), Chest pain, Depression, Essential hypertension, GERD (gastroesophageal reflux disease), H/O cardiovascular stress test, H/O Doppler ultrasound, H/O echocardiogram, H/O echocardiogram, H/O exercise stress test, H/O percutaneous left heart catheterization, H/O tilt table evaluation, Hx of cardiovascular stress test, Hx of cardiovascular stress test, Hx of echocardiogram, Hyperlipidemia, Orthostatic hypotension, Other activity(E029.9), Post PTCA, and Post PTCA. and presents with     Plan:  1. Vasovagal attacks: tilt table test was normal, she had HTN she has sweatings, she keep a fan and cold water bottle, will get records of her labs from  PMD office  2. Memory loss: recommend to see neurologist, she has problem keeping tract of her thoughts, she is on celexa and buspar  3. Leg swelling\": venous doppler ordered, recommend compression, she takes maxide as needed. 4. CAD: Continue aspirin,continue lopressor and zocor. 5. ? Anemia: will get labs from PMD office  6. Palpitations: stable  7. Dyslipidemia: continue statins  8. HTN: Relatively not controlled, increase isinopril to 20mg daily,continue lopressor  9. Umbilical hernia s/p sx. Health maintenance: exerise and   All labs, medications and tests reviewed, continue all other medications of all above medical condition listed as is.     [unfilled]

## 2021-10-19 ENCOUNTER — TELEPHONE (OUTPATIENT)
Dept: CARDIOLOGY CLINIC | Age: 66
End: 2021-10-19

## 2021-10-19 NOTE — TELEPHONE ENCOUNTER
Patient called stating that she is having chest discomfort to the point that she took a nitro. Please call her back at ph# [phone].

## 2021-10-25 ENCOUNTER — OFFICE VISIT (OUTPATIENT)
Dept: CARDIOLOGY CLINIC | Age: 66
End: 2021-10-25
Payer: MEDICARE

## 2021-10-25 VITALS
HEIGHT: 67 IN | DIASTOLIC BLOOD PRESSURE: 64 MMHG | SYSTOLIC BLOOD PRESSURE: 132 MMHG | BODY MASS INDEX: 34.62 KG/M2 | HEART RATE: 74 BPM | WEIGHT: 220.6 LBS

## 2021-10-25 DIAGNOSIS — I25.10 CORONARY ARTERY DISEASE INVOLVING NATIVE CORONARY ARTERY OF NATIVE HEART WITHOUT ANGINA PECTORIS: Primary | ICD-10-CM

## 2021-10-25 DIAGNOSIS — R06.02 SHORTNESS OF BREATH: ICD-10-CM

## 2021-10-25 DIAGNOSIS — F41.9 ANXIETY: ICD-10-CM

## 2021-10-25 DIAGNOSIS — R55 VASOVAGAL ATTACK: ICD-10-CM

## 2021-10-25 DIAGNOSIS — E78.2 MIXED HYPERLIPIDEMIA: ICD-10-CM

## 2021-10-25 DIAGNOSIS — I10 ESSENTIAL HYPERTENSION: ICD-10-CM

## 2021-10-25 DIAGNOSIS — R07.9 CHEST PAIN, UNSPECIFIED TYPE: ICD-10-CM

## 2021-10-25 PROCEDURE — 93000 ELECTROCARDIOGRAM COMPLETE: CPT | Performed by: NURSE PRACTITIONER

## 2021-10-25 PROCEDURE — 99214 OFFICE O/P EST MOD 30 MIN: CPT | Performed by: NURSE PRACTITIONER

## 2021-10-25 NOTE — ASSESSMENT & PLAN NOTE
EKG obtained no significant change. Stress test in March did not show any ischemia. Reports taking nitro during episode of chest pain which was relieved.

## 2021-10-25 NOTE — ASSESSMENT & PLAN NOTE
-PCI of LAD in 2012 and RCA in 2013. She had a stress test and echo at the beginning of the year. Stress test was negative for ischemia. Echocardiogram showed moderate aortic regurgitation.

## 2021-10-25 NOTE — PATIENT INSTRUCTIONS
**It is YOUR responsibilty to bring medication bottles and/or updated medication list to [address] [place] [address]. This will allow us to better serve you and all your healthcare needs**    Please be informed that if you contact our office outside of normal business hours the physician on call cannot help with any scheduling or rescheduling issues, procedure instruction questions or any type of medication issue. We advise you for any urgent/emergency that you go to the nearest emergency room!     PLEASE CALL OUR OFFICE DURING NORMAL BUSINESS HOURS    Monday - Friday   8 am to 5 pm    [address] [address]: [phone]    [place]:  [phone]

## 2021-10-25 NOTE — PROGRESS NOTES
[facility]  Sushil Hoskins[address]  Phone: [phone]    Fax [phone]                  Tonya Hudson MD, Leah Diaz MD, Rolando Ovalle MD, MD Leyla Boggs MD Bernita Hillock, MD Buck Blandon, APRN      Kira Valero, APRN  Meseret Esteban, APRN     Sunny Phan, APRN        Cardiology Progress Note      10/25/2021    RE: Lilliana Dinh  ([birth year])                             Primary cardiologist: Dr. Leyla Baum       Subjective:  CC:   1. Coronary artery disease involving native coronary artery of native heart without angina pectoris    2. Essential hypertension    3. Mixed hyperlipidemia    4. Chest pain, unspecified type        HPI: Lilliana Dinh, who is a  78y.o. year old female with a past medical history as listed below. Patient presents to the office for follow up on CAD, HTN, chest pain, and hyperlipidemia. Patient more Holter monitor which did not show any significant abnormalities but noted to have symptoms associated with sinus bradycardia. Patient has vasovagal attacks. Stress test, tilt table, and echo normal.  Patient is very anxious and reports episode of chest pain and shortness of breath. patient is  compliant with medications. Patient denies any chest pain, shortness of breath, syncope, or palpitations. Past Medical History:   Diagnosis Date    Anxiety     CAD (coronary artery disease)     Chest pain     Depression     Essential hypertension 04/02/2021    GERD (gastroesophageal reflux disease)     H/O cardiovascular stress test     7/26/12-No scintigraphic evidence of inducible myocardial ischemia. Global Left ventricular sytolic function normal. Rest EF 64%. No ECG changes. Unremarkable pharmacological stress test. Normal Myocardial Perfusion Study. 3/30/11- EF70% Normal Myocardial Perfusion study.  2/18/2010 EF =>55%;1/2009 EF 65%, 1/2008, 1/2007, 5/2006, 2/2006 EF70%  H/O Doppler ultrasound 03/03/2008     Carotid Report- No Significant atherosclerotic plaque noted in the right internal carotid artery. The Doppler flow velocities w/in FREDERIC are within normal limits. There is intimal thinckening but no significant atherosclerotic plaque noted in LICA. The Dopple flow velocities w/in LICA are Within Normal Limits.  H/O echocardiogram     7/26/12- EF=>55%. Left ventricular systolic function is GKDXKA.0/9/1995 EF =>55%, 12/2010 EF55%;  2/14/2008    H/O echocardiogram 07/17/2017    EF >55%    H/O exercise stress test 06/22/2018    treadmill    H/O percutaneous left heart catheterization 03/11/2014    EF 55% Left anterior descending artery has patent stent, proximal LAD has eccentric lesion of 30-40% unchanged from last time, The right coronary artery is a dominant vessel with abberant take off, has 40-50% mid RCA stenosis, proximal stent is patent. No evidence of hemodynamically significant coronary artery disease, I have tried to do FFR of RCA, however, because of  aberrant take off, could no    H/O tilt table evaluation 08/18/2021    Normal response.  Hx of cardiovascular stress test 05/14/2015    cardiolite-normal,EF63%    Hx of cardiovascular stress test 03/10/2020    Normal study    Hx of echocardiogram 03/15/2016    EF 55%, normal LV, trivial MR & TR, mild aortic insufficiency.  Hyperlipidemia     Orthostatic hypotension     Other activity(E029.9) 09/02/2008    48 HR Holter Monitor- Predominat rhythm is sinus rhythm. No significant ectopy noted.  Post PTCA 08/23/2013    stent LAD    Post PTCA 08/13/2012    RCA 99% reduced to 0 w/ PTCA and stent w. 2.75 X 23 Xience. Left main coronary artery has no signif. dis. LAD artery has mild disease. CX artery has no signif. disease.  EF 55%       Current Outpatient Medications   Medication Sig Dispense Refill    lisinopril (PRINIVIL;ZESTRIL) 20 MG tablet Take 1 tablet by mouth daily 10mg daily 90 tablet 3    metoprolol succinate (TOPROL XL) 25 MG extended release tablet Take 1 tablet by mouth daily 30 tablet 3    simvastatin (ZOCOR) 20 MG tablet Take 1 tablet by mouth nightly 90 tablet 3    famotidine (PEPCID) 10 MG tablet Take 10 mg by mouth daily       lidocaine-prilocaine (EMLA) 2.5-2.5 % cream Apply topically as needed. 1 Tube 3    diphenhydrAMINE (BENADRYL) 50 MG capsule 50 mg 1 hour prior to procedure (Patient taking differently: No sig reported) 2 capsule 0    nitroGLYCERIN (NITROSTAT) 0.4 MG SL tablet Place 1 tablet under the tongue every 5 minutes as needed for Chest pain 25 tablet 3    VENTOLIN  (90 Base) MCG/ACT inhaler INHALE 2 PUFFS INTO THE LUNGS DAILY AS NEEDED  3    pantoprazole (PROTONIX) 40 MG tablet TAKE 1 TABLET BY MOUTH EVERY DAY  2    citalopram (CELEXA) 20 MG tablet       dicyclomine (BENTYL) 20 MG tablet Take 20 mg by mouth every 6 hours       loratadine (CLARITIN) 10 MG tablet Take 10 mg by mouth daily.  aspirin EC 81 MG EC tablet Take 1 tablet by mouth daily. 30 tablet 3    triamterene-hydrochlorothiazide (MAXZIDE) 75-50 MG per tablet Take 1 tablet by mouth as needed       promethazine (PHENERGAN) 25 MG tablet promethazine 25 mg tablet   TAKE 1 TABLET BY MOUTH TWICE A DAY AS NEEDED for dizziness (Patient not taking: Reported on 10/25/2021)      meclizine (ANTIVERT) 12.5 MG tablet TAKE 1 TABLET BY MOUTH EVERY 6 HOURS AS NEEDED FOR DIZZINESS (Patient not taking: Reported on 10/25/2021)  2     No current facility-administered medications for this visit. Review of Systems:  Review of Systems   Neurological: Positive for dizziness. All other systems reviewed and are negative.          Objective:      Physical Exam:  /64 (Site: Left Upper Arm, Position: Sitting, Cuff Size: Large Adult)   Pulse 74   Ht 5' 7\" (1.702 m)   Wt 220 lb 9.6 oz (100.1 kg)   BMI 34.55 kg/m²   Wt Readings from Last 3 Encounters:   10/25/21 220 lb 9.6 oz (100.1 kg)   09/13/21 233 lb 9.6 oz (106 kg)   08/09/21 234 lb 3.2 oz (106.2 kg)     Body mass index is 34.55 kg/m². Physical exam:  Physical Exam  Constitutional:       Appearance: She is well-developed. Cardiovascular:      Rate and Rhythm: Normal rate and regular rhythm. Pulses: Intact distal pulses. Dorsalis pedis pulses are 2+ on the right side and 2+ on the left side. Posterior tibial pulses are 2+ on the right side and 2+ on the left side. Heart sounds: Normal heart sounds, S1 normal and S2 normal.   Pulmonary:      Effort: Pulmonary effort is normal.      Breath sounds: Normal breath sounds. Musculoskeletal:         General: Normal range of motion. Skin:     General: Skin is warm and dry. Neurological:      Mental Status: She is alert and oriented to person, place, and time. DATA:  Lab Results   Component Value Date    TROPONINI <0.006 03/10/2014     BNP:    Lab Results   Component Value Date    BNP 33 03/09/2014     PT/INR:  No results found for: PTINR  No results found for: LABA1C  Lab Results   Component Value Date    CHOL 187 04/17/2013    TRIG 79 04/17/2013    HDL 79 (A) 04/17/2013    LDLCALC 92 04/17/2013     Lab Results   Component Value Date    ALT 8 (L) 06/17/2016    AST 13 (L) 06/17/2016     TSH:  No results found for: TSH    Vitals:    10/25/21 1042   BP: 132/64   Pulse: 74       Echo:3/1/21   Left ventricular systolic function is normal.   Ejection fraction is visually estimated at 55-60%. Moderate aortic regurgitation; PHT: 382 msec. Sclerotic, but non-stenotic aortic valve. No evidence of any pericardial effusion.     Stress Test:3/10/21  Negative for ischemia    The ASCVD Risk score (Ron Norman. et al., 2013) failed to calculate for the following reasons:    Cannot find a previous HDL lab    Cannot find a previous total cholesterol lab      Assessment/ Plan:     Essential hypertension   -Stable, continue with lisinopril 20 mg daily, Toprol-XL 25 mg daily, and Maxide 75-50 mg daily    Mixed hyperlipidemia   -No labs completed. Get BMP, LFT's, and lipid panel. CAD (coronary artery disease)   -PCI of LAD in 2012 and RCA in 2013. She had a stress test and echo at the beginning of the year. Stress test was negative for ischemia. Echocardiogram showed moderate aortic regurgitation. Chest pain   -EKG obtained no significant change. Stress test in March did not show any ischemia. Reports taking nitro during episode of chest pain which was relieved. Will discuss [facility] with Dr. Elizabeth Sherwood. Late entry: will get stress test.     Shortness of breath  -Patient having frequent episodes of chest pain and shortness of breath along with diaphoresis. She reports having recent labs from PCP office will obtain records. Anxiety  -Patient having episodes of shortness of breath, chest pain, an diaphoresis. Episodes have been ongoing over the last several months but chest pain has increased. Patient is very anxious recommend follow-up with mental health for better titration of current medications. Vasovagal attack  -Normal tilt table test.  Patient has not lost consciousness. She reports having cold sweats, chest pain and shortness of breath. Recommend deep breathing exercises. Patient seen, interviewed and examined. Testing was reviewed. Patient is encouraged to exercise even a brisk walk for 30 minutes at least 3 to 4 times a week. Lifestyle and risk factor modificatons discussed. Various goals are discussed and questions answered. Continue current medications. Appropriate prescriptions are addressed. Questions answered and patient verbalizes understanding. Call for any problems, questions, or concerns. Pt is to follow up in 3 months for Cardiac management    Electronically signed by Rudolph Blanco.  DENVER Rodriguez CNP on 10/25/2021 at 11:08 AM

## 2021-10-28 ENCOUNTER — TELEPHONE (OUTPATIENT)
Dept: CARDIOLOGY CLINIC | Age: 66
End: 2021-10-28

## 2021-10-28 NOTE — TELEPHONE ENCOUNTER
Called patient to schedule stress test, per Riky Fajardo. Patient at [address] [place] [address]. Unable to talk at this time. Says she will call back.

## 2021-10-28 NOTE — TELEPHONE ENCOUNTER
Scheduled nuclear medicine stress test Therman Fleet for patient. Moved follow-up appointment from 11/8 to 11/12 to see Yadira MOTT. Patient confirmed NM instructions and upcoming appointments.

## 2021-11-09 ENCOUNTER — TELEPHONE (OUTPATIENT)
Dept: NEUROLOGY | Age: 66
End: 2021-11-09

## 2021-11-11 ENCOUNTER — OFFICE VISIT (OUTPATIENT)
Dept: SURGERY | Age: 66
End: 2021-11-11
Payer: MEDICARE

## 2021-11-11 VITALS
HEIGHT: 67 IN | DIASTOLIC BLOOD PRESSURE: 70 MMHG | WEIGHT: 219.6 LBS | SYSTOLIC BLOOD PRESSURE: 132 MMHG | BODY MASS INDEX: 34.47 KG/M2 | HEART RATE: 85 BPM

## 2021-11-11 DIAGNOSIS — R10.33 UMBILICAL PAIN: Primary | ICD-10-CM

## 2021-11-11 PROCEDURE — 99204 OFFICE O/P NEW MOD 45 MIN: CPT | Performed by: SURGERY

## 2021-11-11 ASSESSMENT — PATIENT HEALTH QUESTIONNAIRE - PHQ9
SUM OF ALL RESPONSES TO PHQ QUESTIONS 1-9: 2
SUM OF ALL RESPONSES TO PHQ9 QUESTIONS 1 & 2: 2
2. FEELING DOWN, DEPRESSED OR HOPELESS: 1
1. LITTLE INTEREST OR PLEASURE IN DOING THINGS: 1

## 2021-11-12 ASSESSMENT — ENCOUNTER SYMPTOMS
PHOTOPHOBIA: 0
EYE REDNESS: 0
COLOR CHANGE: 0
CHOKING: 0
APNEA: 0
BACK PAIN: 0
EYE ITCHING: 0
ANAL BLEEDING: 0
RECTAL PAIN: 0
STRIDOR: 0
SORE THROAT: 0
CONSTIPATION: 0

## 2021-11-13 NOTE — PROGRESS NOTES
SUBJECTIVE:  HPI: Patient presents for evaluation of asoft tissue mass. Mass is located on the at the umbilicus. Patient has had previous umbilical hernia repair several years ago with complication of wound infection. Patient recalls having suture granuloma which was treated surgically. Unsure if there is any remaining suture. There is some itching noted at the scar site which could be secondary to keloid formation. The area is sensitive to even clothing touch. Patient denies any fever chills. There is no bulge in this area. I have reviewed the patient's(pertinent information to this visit) medical history, familyhistory(scanned in  the Media tab under \"patient questioner\"), social history and review of systems with the patient today in the office. Past Surgical History:   Procedure Laterality Date    CARDIAC CATHETERIZATION      4/24/2006-Left heart cath-    CARDIAC CATHETERIZATION  03/11/2014    EF 55% Left anterior descending artery has patent stent, proximal LAD has eccentric lesion of 30-40% unchanged from last time, The right coronary artery is a dominant vessel with abberant take off, has 40-50% mid RCA stenosis, proximal stent is patent.  No evidence of hemodynamically significant coronary artery disease, I have tried to do FFR of RCA, however, because of  aberrant take off, could no    CORONARY ANGIOPLASTY WITH STENT PLACEMENT  8/23/13 8/23/13 LAD 7/2012; 4/25/2006- PTCA w/ stent-> RCA    EYE SURGERY      HERNIA REPAIR  03/08/2017    HYSTERECTOMY      TONSILLECTOMY AND ADENOIDECTOMY      ULNAR TUNNEL RELEASE  11/2006    Ulnar nerve tranfer Left elbow    WRIST SURGERY  7/2008    Left wrist     Past Medical History:   Diagnosis Date    Anxiety     CAD (coronary artery disease)     Chest pain     Depression     Essential hypertension 04/02/2021    GERD (gastroesophageal reflux disease)     H/O cardiovascular stress test     7/26/12-No scintigraphic evidence of inducible myocardial ischemia. Global Left ventricular sytolic function normal. Rest EF 64%. No ECG changes. Unremarkable pharmacological stress test. Normal Myocardial Perfusion Study. 3/30/11- EF70% Normal Myocardial Perfusion study. 2/18/2010 EF =>55%;1/2009 EF 65%, 1/2008, 1/2007, 5/2006, 2/2006 EF70%    H/O Doppler ultrasound 03/03/2008     Carotid Report- No Significant atherosclerotic plaque noted in the right internal carotid artery. The Doppler flow velocities w/in FREDERIC are within normal limits. There is intimal thinckening but no significant atherosclerotic plaque noted in LICA. The Dopple flow velocities w/in LICA are Within Normal Limits.  H/O echocardiogram     7/26/12- EF=>55%. Left ventricular systolic function is [id number]/4202 EF =>55%, 12/2010 EF55%;  2/14/2008    H/O echocardiogram 07/17/2017    EF >55%    H/O exercise stress test 06/22/2018    treadmill    H/O percutaneous left heart catheterization 03/11/2014    EF 55% Left anterior descending artery has patent stent, proximal LAD has eccentric lesion of 30-40% unchanged from last time, The right coronary artery is a dominant vessel with abberant take off, has 40-50% mid RCA stenosis, proximal stent is patent. No evidence of hemodynamically significant coronary artery disease, I have tried to do FFR of RCA, however, because of  aberrant take off, could no    H/O tilt table evaluation 08/18/2021    Normal response.  Hx of cardiovascular stress test 05/14/2015    cardiolite-normal,EF63%    Hx of cardiovascular stress test 03/10/2020    Normal study    Hx of echocardiogram 03/15/2016    EF 55%, normal LV, trivial MR & TR, mild aortic insufficiency.  Hyperlipidemia     Orthostatic hypotension     Other activity(E029.9) 09/02/2008    48 HR Holter Monitor- Predominat rhythm is sinus rhythm. No significant ectopy noted.     Post PTCA 08/23/2013    stent LAD    Post PTCA 08/13/2012    RCA 99% reduced to 0 w/ PTCA and stent w. 2.75 X 23 Xience. Left main coronary artery has no signif. dis. LAD artery has mild disease. CX artery has no signif. disease. EF 55%     Family History   Problem Relation Age of Onset    Diabetes Sister     Heart Disease Sister     High Blood Pressure Sister     Diabetes Brother     Heart Disease Brother     High Blood Pressure Brother     Cancer Maternal Aunt     Cancer Paternal Aunt     Diabetes Brother     High Blood Pressure Brother      Social History     Socioeconomic History    Marital status: Single     Spouse name: Not on file    Number of children: Not on file    Years of education: Not on file    Highest education level: Not on file   Occupational History    Not on file   Tobacco Use    Smoking status: Former Smoker     Packs/day: 0.25     Types: Cigarettes     Start date: 3/1/2020    Smokeless tobacco: Never Used   Substance and Sexual Activity    Alcohol use: Yes     Comment: 5 x a year. Caffeine: pepsi (sometimes), decaf tea     Drug use: Yes     Types: Marijuana [place])     Comment: 2 times per month -medical canabis, edibles    Sexual activity: Not Currently   Other Topics Concern    Not on file   Social History Narrative    Not on file     Social Determinants of Health     Financial Resource Strain:     Difficulty of Paying Living Expenses: Not on file   Food Insecurity:     Worried About Running Out of Food in the Last Year: Not on file    Rinku of Food in the Last Year: Not on file   Transportation Needs:     Lack of Transportation (Medical): Not on file    Lack of Transportation (Non-Medical):  Not on file   Physical Activity:     Days of Exercise per Week: Not on file    Minutes of Exercise per Session: Not on file   Stress:     Feeling of Stress : Not on file   Social Connections:     Frequency of Communication with Friends and Family: Not on file    Frequency of Social Gatherings with Friends and Family: Not on file    Attends Cheondoism Services: Not on file   [place] EVERY 6 HOURS AS NEEDED FOR DIZZINESS (Patient not taking: Reported on 10/25/2021)  2     No current facility-administered medications for this visit. Allergies   Allergen Reactions    Pregabalin Shortness Of Breath    Hydromorphone Hcl Itching    Codeine Itching    Aspirin Rash     Needs to enteric coating      Diatrizoate Rash    Gabapentin Nausea And Vomiting    Ibuprofen Rash    Iodine Rash       Review of Systems:         Review of Systems   Constitutional: Negative for chills and fever. HENT: Negative for ear pain, mouth sores, sore throat and tinnitus. Eyes: Negative for photophobia, redness and itching. Respiratory: Negative for apnea, choking and stridor. Cardiovascular: Negative for chest pain and palpitations. Gastrointestinal: Negative for anal bleeding, constipation and rectal pain. Endocrine: Negative for polydipsia. Genitourinary: Negative for enuresis, flank pain and hematuria. Musculoskeletal: Negative for back pain, joint swelling and myalgias. Skin: Negative for color change and pallor. Allergic/Immunologic: Negative for environmental allergies. Neurological: Negative for syncope and speech difficulty. Psychiatric/Behavioral: Negative for confusion and hallucinations. OBJECTIVE:  Physical Exam:     /70 (Site: Left Upper Arm, Position: Sitting, Cuff Size: Large Adult)   Pulse 85   Ht 5' 7\" (1.702 m)   Wt 219 lb 9.6 oz (99.6 kg)   BMI 34.39 kg/m²      Physical Exam  Constitutional:       Appearance: She is well-developed. HENT:      Head: Normocephalic. Eyes:      Pupils: Pupils are equal, round, and reactive to light. Cardiovascular:      Rate and Rhythm: Normal rate. Pulmonary:      Effort: Pulmonary effort is normal.   Abdominal:      General: There is no distension. Palpations: Abdomen is soft. There is no mass. Tenderness: There is no abdominal tenderness. There is no guarding or rebound.        Musculoskeletal: General: Normal range of motion. Cervical back: Normal range of motion and neck supple. Skin:     General: Skin is warm. Neurological:      Mental Status: She is alert and oriented to person, place, and time. ASSESSMENT:  1. Umbilical pain          PLAN:  Treatment: We will obtain CT of the abdomen pelvis to rule out suture granuloma or small abscess. Clinically there is no sign of infection. Patient counseled on risks, benefits, and alternatives of treatment plan at length today. Patient states an understanding and willingness to proceed with plan. Orders Placed This Encounter   Procedures    CT ABDOMEN PELVIS WO CONTRAST Additional Contrast? None        No orders of the defined types were placed in this encounter. Follow Up:  No follow-ups on file.         Kimberley Bruce MD

## 2021-11-16 ENCOUNTER — PROCEDURE VISIT (OUTPATIENT)
Dept: CARDIOLOGY CLINIC | Age: 66
End: 2021-11-16
Payer: MEDICARE

## 2021-11-16 DIAGNOSIS — F41.9 ANXIETY: ICD-10-CM

## 2021-11-16 DIAGNOSIS — E78.2 MIXED HYPERLIPIDEMIA: ICD-10-CM

## 2021-11-16 DIAGNOSIS — R06.02 SHORTNESS OF BREATH: ICD-10-CM

## 2021-11-16 DIAGNOSIS — I25.10 CORONARY ARTERY DISEASE INVOLVING NATIVE CORONARY ARTERY OF NATIVE HEART WITHOUT ANGINA PECTORIS: ICD-10-CM

## 2021-11-16 DIAGNOSIS — I10 ESSENTIAL HYPERTENSION: ICD-10-CM

## 2021-11-16 DIAGNOSIS — R55 VASOVAGAL ATTACK: ICD-10-CM

## 2021-11-16 DIAGNOSIS — R07.9 CHEST PAIN, UNSPECIFIED TYPE: ICD-10-CM

## 2021-11-16 LAB
LV EF: 64 %
LVEF MODALITY: NORMAL

## 2021-11-16 PROCEDURE — 93017 CV STRESS TEST TRACING ONLY: CPT | Performed by: INTERNAL MEDICINE

## 2021-11-16 PROCEDURE — 93018 CV STRESS TEST I&R ONLY: CPT | Performed by: INTERNAL MEDICINE

## 2021-11-16 PROCEDURE — 78452 HT MUSCLE IMAGE SPECT MULT: CPT | Performed by: INTERNAL MEDICINE

## 2021-11-16 PROCEDURE — A9500 TC99M SESTAMIBI: HCPCS | Performed by: INTERNAL MEDICINE

## 2021-11-18 ENCOUNTER — OFFICE VISIT (OUTPATIENT)
Dept: CARDIOLOGY CLINIC | Age: 66
End: 2021-11-18
Payer: MEDICARE

## 2021-11-18 ENCOUNTER — HOSPITAL ENCOUNTER (OUTPATIENT)
Dept: CT IMAGING | Age: 66
Discharge: HOME | End: 2021-11-18
Payer: MEDICARE

## 2021-11-18 VITALS
WEIGHT: 222.2 LBS | HEART RATE: 56 BPM | BODY MASS INDEX: 34.88 KG/M2 | HEIGHT: 67 IN | SYSTOLIC BLOOD PRESSURE: 122 MMHG | DIASTOLIC BLOOD PRESSURE: 64 MMHG

## 2021-11-18 DIAGNOSIS — R55 VASOVAGAL ATTACK: ICD-10-CM

## 2021-11-18 DIAGNOSIS — R10.33 UMBILICAL PAIN: ICD-10-CM

## 2021-11-18 DIAGNOSIS — R07.9 CHEST PAIN, UNSPECIFIED TYPE: ICD-10-CM

## 2021-11-18 DIAGNOSIS — I25.10 CORONARY ARTERY DISEASE INVOLVING NATIVE CORONARY ARTERY OF NATIVE HEART WITHOUT ANGINA PECTORIS: Primary | ICD-10-CM

## 2021-11-18 DIAGNOSIS — R06.02 SHORTNESS OF BREATH: ICD-10-CM

## 2021-11-18 DIAGNOSIS — E78.2 MIXED HYPERLIPIDEMIA: ICD-10-CM

## 2021-11-18 PROCEDURE — 74176 CT ABD & PELVIS W/O CONTRAST: CPT

## 2021-11-18 PROCEDURE — 99214 OFFICE O/P EST MOD 30 MIN: CPT | Performed by: NURSE PRACTITIONER

## 2021-11-18 RX ORDER — TRIAMCINOLONE ACETONIDE 1 MG/G
CREAM TOPICAL
COMMUNITY
Start: 2021-10-28 | End: 2022-05-02 | Stop reason: ALTCHOICE

## 2021-11-18 NOTE — LETTER
Bill Eubanks, APRN-CNP    [facility]  [birth year]  [id number]    Have you had any Chest Pain that is not new? - No    Have you had any Shortness of Breath - No    Have you had any dizziness - Yes  When do you feel dizzy with position change   How long does it last - about 1 minute   Doctors have stated she has vertigo and is prone to syncopal episodes. Says this is common. Stopped taking meclizine. Have you had any palpitations that are not new? - Yes  Do you feel your heart pounding   Says it sometimes feels like her heart \"is pounding against her back. \"   How long does it last - can last for hours     Do you have any edema - swelling in legs, ankles, feet    How long have they been having edema - Years   Does not happen every day.    Have they worn compression stockings Yes (not often)   If they have worn compression stockings - intermittent since 2010    Do you have a surgery or procedure scheduled in the near future - Yes  If Yes - Who is the surgery with? - Dr. Nicole Glass [id number], [place], [address] [facility]  Phone number of physician - [phone]  Type of surgery - repair of area around patient's naval

## 2021-11-18 NOTE — PROGRESS NOTES
[facility] [address] [facility] [address]  Phone: [phone]    Fax [phone]                  Sal Tomlinson MD, Mike Maya MD, Polo Marroquin MD, MD Bud Jones MD Vicki Bares, MD Annye Hemp, APRARTIS Lin, DENVER Zabala, DENVER Soliman, APRN        Cardiology Progress Note      11/18/2021    RE: Jase Grewal  ([id number])                             Primary cardiologist: Dr. Bud Sanchez       Subjective:  CC:   1. Coronary artery disease involving native coronary artery of native heart without angina pectoris    2. Vasovagal attack    3. Mixed hyperlipidemia    4. Shortness of breath    5. Chest pain, unspecified type        HPI: Jase Grewal, who is a  78y.o. year old female with a past medical history as listed below. Patient presents to the office for follow up on CAD, HTN, chest pain, vasovagal attacks, and hyperlipidemia. Patient more Holter monitor which did not show any significant abnormalities but noted to have symptoms associated with sinus bradycardia. Patient has vasovagal attacks. Stress test, tilt table, and echo normal.  Patient is very anxious and reports episode of chest pain and shortness of breath. patient is  compliant with medications. Patient denies any chest pain, shortness of breath, syncope, or palpitations. Past Medical History:   Diagnosis Date    Anxiety     CAD (coronary artery disease)     Chest pain     Depression     Essential hypertension 04/02/2021    GERD (gastroesophageal reflux disease)     H/O cardiovascular stress test     7/26/12-No scintigraphic evidence of inducible myocardial ischemia. Global Left ventricular sytolic function normal. Rest EF 64%. No ECG changes. Unremarkable pharmacological stress test. Normal Myocardial Perfusion Study. 3/30/11- EF70% Normal Myocardial Perfusion study.  2/18/2010 EF =>55%;1/2009 EF 65%, 1/2008, 1/2007, 5/2006, 2/2006 EF70%    H/O Doppler ultrasound 03/03/2008     Carotid Report- No Significant atherosclerotic plaque noted in the right internal carotid artery. The Doppler flow velocities w/in FREDERIC are within normal limits. There is intimal thinckening but no significant atherosclerotic plaque noted in LICA. The Dopple flow velocities w/in LICA are Within Normal Limits.  H/O echocardiogram     7/26/12- EF=>55%. Left ventricular systolic function is WMHYXR.5/4/4576 EF =>55%, 12/2010 EF55%;  2/14/2008    H/O echocardiogram 07/17/2017    EF >55%    H/O exercise stress test 06/22/2018    treadmill    H/O percutaneous left heart catheterization 03/11/2014    EF 55% Left anterior descending artery has patent stent, proximal LAD has eccentric lesion of 30-40% unchanged from last time, The right coronary artery is a dominant vessel with abberant take off, has 40-50% mid RCA stenosis, proximal stent is patent. No evidence of hemodynamically significant coronary artery disease, I have tried to do FFR of RCA, however, because of  aberrant take off, could no    H/O tilt table evaluation 08/18/2021    Normal response.  Hx of cardiovascular stress test 05/14/2015    cardiolite-normal,EF63%    Hx of cardiovascular stress test 03/10/2020    Normal study    Hx of echocardiogram 03/15/2016    EF 55%, normal LV, trivial MR & TR, mild aortic insufficiency.  Hyperlipidemia     Orthostatic hypotension     Other activity(E029.9) 09/02/2008    48 HR Holter Monitor- Predominat rhythm is sinus rhythm. No significant ectopy noted.  Post PTCA 08/23/2013    stent LAD    Post PTCA 08/13/2012    RCA 99% reduced to 0 w/ PTCA and stent w. 2.75 X 23 Xience. Left main coronary artery has no signif. dis. LAD artery has mild disease. CX artery has no signif. disease.  EF 55%       Current Outpatient Medications   Medication Sig Dispense Refill    triamcinolone (KENALOG) 0.1 % cream       lisinopril (PRINIVIL;ZESTRIL) 20 MG tablet Take 1 tablet by mouth daily 10mg daily (Patient taking differently: Take 20 mg by mouth daily 1/2 tablet 2 times daily) 90 tablet 3    metoprolol succinate (TOPROL XL) 25 MG extended release tablet Take 1 tablet by mouth daily (Patient taking differently: Take 25 mg by mouth 2 times daily ) 30 tablet 3    simvastatin (ZOCOR) 20 MG tablet Take 1 tablet by mouth nightly 90 tablet 3    famotidine (PEPCID) 10 MG tablet Take 10 mg by mouth daily       diphenhydrAMINE (BENADRYL) 50 MG capsule 50 mg 1 hour prior to procedure (Patient taking differently: No sig reported) 2 capsule 0    VENTOLIN  (90 Base) MCG/ACT inhaler INHALE 2 PUFFS INTO THE LUNGS DAILY AS NEEDED  3    pantoprazole (PROTONIX) 40 MG tablet TAKE 1 TABLET BY MOUTH EVERY DAY  2    dicyclomine (BENTYL) 20 MG tablet Take 20 mg by mouth every 6 hours       loratadine (CLARITIN) 10 MG tablet Take 10 mg by mouth daily.  aspirin EC 81 MG EC tablet Take 1 tablet by mouth daily. (Patient taking differently: Take 81 mg by mouth daily Coated aspirin) 30 tablet 3    triamterene-hydrochlorothiazide (MAXZIDE) 75-50 MG per tablet Take 0.5 tablets by mouth as needed 0.5 tablet 2 times weekly      lidocaine-prilocaine (EMLA) 2.5-2.5 % cream Apply topically as needed. (Patient not taking: Reported on 11/11/2021) 1 Tube 3    nitroGLYCERIN (NITROSTAT) 0.4 MG SL tablet Place 1 tablet under the tongue every 5 minutes as needed for Chest pain (Patient not taking: Reported on 11/11/2021) 25 tablet 3    meclizine (ANTIVERT) 12.5 MG tablet TAKE 1 TABLET BY MOUTH EVERY 6 HOURS AS NEEDED FOR DIZZINESS (Patient not taking: Reported on 10/25/2021)  2     No current facility-administered medications for this visit. Review of Systems:  Review of Systems   Neurological: Positive for dizziness. All other systems reviewed and are negative.          Objective:      Physical Exam:  /64 (Site: Left Upper Arm, Position: Sitting, Cuff Size: Large Adult)   Pulse 56   Ht 5' 7\" (1.702 m)   Wt 222 lb 3.2 oz (100.8 kg)   BMI 34.80 kg/m²   Wt Readings from Last 3 Encounters:   11/18/21 222 lb 3.2 oz (100.8 kg)   11/11/21 219 lb 9.6 oz (99.6 kg)   10/25/21 220 lb 9.6 oz (100.1 kg)     Body mass index is 34.8 kg/m². Physical exam:  Physical Exam  Constitutional:       Appearance: She is well-developed. Cardiovascular:      Rate and Rhythm: Normal rate and regular rhythm. Pulses: Intact distal pulses. Dorsalis pedis pulses are 2+ on the right side and 2+ on the left side. Posterior tibial pulses are 2+ on the right side and 2+ on the left side. Heart sounds: Normal heart sounds, S1 normal and S2 normal.   Pulmonary:      Effort: Pulmonary effort is normal.      Breath sounds: Normal breath sounds. Musculoskeletal:         General: Normal range of motion. Skin:     General: Skin is warm and dry. Neurological:      Mental Status: She is alert and oriented to person, place, and time. DATA:  Lab Results   Component Value Date    TROPONINI <0.006 03/10/2014     BNP:    Lab Results   Component Value Date    BNP 33 03/09/2014     PT/INR:  No results found for: PTINR  No results found for: LABA1C  Lab Results   Component Value Date    CHOL 187 04/17/2013    TRIG 79 04/17/2013    HDL 79 (A) 04/17/2013    LDLCALC 92 04/17/2013     Lab Results   Component Value Date    ALT 8 (L) 06/17/2016    AST 13 (L) 06/17/2016     TSH:  No results found for: TSH    Vitals:    11/18/21 1055   BP: 122/64   Pulse:        Echo:3/1/21   Left ventricular systolic function is normal.   Ejection fraction is visually estimated at 55-60%. Moderate aortic regurgitation; PHT: 382 msec. Sclerotic, but non-stenotic aortic valve. No evidence of any pericardial effusion.     Stress Test:11/16/21  Negative for ischemia    The ASCVD Risk score (Judith Santamaria, et al., 2013) failed to calculate for the following reasons:    Cannot find a previous HDL lab    Cannot find a previous total cholesterol lab      Assessment/ Plan:     Essential hypertension   -Stable, continue with lisinopril 10 mg BID, Toprol-XL 25 mg BID, and Maxide 75-50 mg 1/2 tab daily    Mixed hyperlipidemia   -No labs completed. Will get labs from PCP. CAD (coronary artery disease)   -PCI of LAD in 2012 and RCA in 2013. She had a stress test and echo at the beginning of the year. Stress test was negative for ischemia. Echocardiogram showed moderate aortic regurgitation. Chest pain   -EKG does not shown any ST or T wave changes. Stress test negative for ischemia. Shortness of breath  -Patient having frequent episodes of chest pain and shortness of breath along with diaphoresis. She reports having recent labs from PCP office will obtain records. Anxiety  -Patient having episodes of shortness of breath, chest pain, an diaphoresis. Patient is very anxious recommend follow-up with mental health for better titration of current medications. Patient reports episodes have been ongoing since clonidine was stopped last year. Vasovagal attack  -Normal tilt table test.  Patient has not lost consciousness. She reports having cold sweats, chest pain and shortness of breath. Recommend deep breathing exercises. Patient seen, interviewed and examined. Testing was reviewed. Patient is encouraged to exercise even a brisk walk for 30 minutes at least 3 to 4 times a week. Lifestyle and risk factor modificatons discussed. Various goals are discussed and questions answered. Continue current medications. Appropriate prescriptions are addressed. Questions answered and patient verbalizes understanding. Call for any problems, questions, or concerns. Pt is to follow up in 6 months for Cardiac management    Electronically signed by Betzaida Vazquez.  DENVER Payton CNP on 11/18/2021 at 11:23 AM

## 2021-11-29 ENCOUNTER — OFFICE VISIT (OUTPATIENT)
Dept: SURGERY | Age: 66
End: 2021-11-29
Payer: MEDICARE

## 2021-11-29 VITALS
WEIGHT: 222.5 LBS | SYSTOLIC BLOOD PRESSURE: 162 MMHG | HEART RATE: 59 BPM | BODY MASS INDEX: 34.92 KG/M2 | OXYGEN SATURATION: 97 % | DIASTOLIC BLOOD PRESSURE: 80 MMHG | HEIGHT: 67 IN

## 2021-11-29 DIAGNOSIS — R10.33 UMBILICAL PAIN: Primary | ICD-10-CM

## 2021-11-29 PROCEDURE — 99214 OFFICE O/P EST MOD 30 MIN: CPT | Performed by: SURGERY

## 2021-11-29 ASSESSMENT — ENCOUNTER SYMPTOMS
ANAL BLEEDING: 0
COLOR CHANGE: 0
RECTAL PAIN: 0
PHOTOPHOBIA: 0
CHOKING: 0
EYE REDNESS: 0
BACK PAIN: 0
EYE ITCHING: 0
STRIDOR: 0
SORE THROAT: 0
APNEA: 0
CONSTIPATION: 0

## 2021-11-29 ASSESSMENT — PATIENT HEALTH QUESTIONNAIRE - PHQ9
SUM OF ALL RESPONSES TO PHQ QUESTIONS 1-9: 3
2. FEELING DOWN, DEPRESSED OR HOPELESS: 1
1. LITTLE INTEREST OR PLEASURE IN DOING THINGS: 2
SUM OF ALL RESPONSES TO PHQ QUESTIONS 1-9: 3
SUM OF ALL RESPONSES TO PHQ9 QUESTIONS 1 & 2: 3
SUM OF ALL RESPONSES TO PHQ QUESTIONS 1-9: 3

## 2021-11-29 NOTE — PROGRESS NOTES
SUBJECTIVE:  HPI: Patient presents for evaluation of asoft tissue mass. Mass is located on the at the umbilicus. Patient has had previous umbilical hernia repair several years ago with complication of wound infection. Patient recalls having suture granuloma which was treated surgically. Unsure if there is any remaining suture. There is some itching noted at the scar site which could be secondary to keloid formation. The area is sensitive to even clothing touch. Patient denies any fever chills. There is no bulge in this area. I have reviewed the patient's(pertinent information to this visit) medical history, familyhistory(scanned in  the Media tab under \"patient questioner\"), social history and review of systems with the patient today in the office. Past Surgical History:   Procedure Laterality Date    CARDIAC CATHETERIZATION      4/24/2006-Left heart cath-    CARDIAC CATHETERIZATION  03/11/2014    EF 55% Left anterior descending artery has patent stent, proximal LAD has eccentric lesion of 30-40% unchanged from last time, The right coronary artery is a dominant vessel with abberant take off, has 40-50% mid RCA stenosis, proximal stent is patent.  No evidence of hemodynamically significant coronary artery disease, I have tried to do FFR of RCA, however, because of  aberrant take off, could no    CORONARY ANGIOPLASTY WITH STENT PLACEMENT  8/23/13 8/23/13 LAD 7/2012; 4/25/2006- PTCA w/ stent-> RCA    EYE SURGERY      HERNIA REPAIR  03/08/2017    HYSTERECTOMY      TONSILLECTOMY AND ADENOIDECTOMY      ULNAR TUNNEL RELEASE  11/2006    Ulnar nerve tranfer Left elbow    WRIST SURGERY  7/2008    Left wrist     Past Medical History:   Diagnosis Date    Anxiety     CAD (coronary artery disease)     Chest pain     Depression     Essential hypertension 04/02/2021    GERD (gastroesophageal reflux disease)     H/O cardiovascular stress test     7/26/12-No scintigraphic evidence of inducible myocardial ischemia. Global Left ventricular sytolic function normal. Rest EF 64%. No ECG changes. Unremarkable pharmacological stress test. Normal Myocardial Perfusion Study. 3/30/11- EF70% Normal Myocardial Perfusion study. 2/18/2010 EF =>55%;1/2009 EF 65%, 1/2008, 1/2007, 5/2006, 2/2006 EF70%    H/O Doppler ultrasound 03/03/2008     Carotid Report- No Significant atherosclerotic plaque noted in the right internal carotid artery. The Doppler flow velocities w/in FREDERIC are within normal limits. There is intimal thinckening but no significant atherosclerotic plaque noted in LICA. The Dopple flow velocities w/in LICA are Within Normal Limits.  H/O echocardiogram     7/26/12- EF=>55%. Left ventricular systolic function is BAPGYC.3/9/1381 EF =>55%, 12/2010 EF55%;  2/14/2008    H/O echocardiogram 07/17/2017    EF >55%    H/O exercise stress test 06/22/2018    treadmill    H/O percutaneous left heart catheterization 03/11/2014    EF 55% Left anterior descending artery has patent stent, proximal LAD has eccentric lesion of 30-40% unchanged from last time, The right coronary artery is a dominant vessel with abberant take off, has 40-50% mid RCA stenosis, proximal stent is patent. No evidence of hemodynamically significant coronary artery disease, I have tried to do FFR of RCA, however, because of  aberrant take off, could no    H/O tilt table evaluation 08/18/2021    Normal response.  Hx of cardiovascular stress test 05/14/2015    cardiolite-normal,EF63%    Hx of cardiovascular stress test 03/10/2020    Normal study    Hx of cardiovascular stress test 11/16/2021    Normal study    Hx of echocardiogram 03/15/2016    EF 55%, normal LV, trivial MR & TR, mild aortic insufficiency.  Hyperlipidemia     Orthostatic hypotension     Other activity(E029.9) 09/02/2008    48 HR Holter Monitor- Predominat rhythm is sinus rhythm. No significant ectopy noted.     Post PTCA 08/23/2013    stent LAD    Post PTCA 08/13/2012    RCA 99% reduced to 0 w/ PTCA and stent w. 2.75 X 23 Xience. Left main coronary artery has no signif. dis. LAD artery has mild disease. CX artery has no signif. disease. EF 55%     Family History   Problem Relation Age of Onset    Diabetes Sister     Heart Disease Sister     High Blood Pressure Sister     Diabetes Brother     Heart Disease Brother     High Blood Pressure Brother     Cancer Maternal Aunt     Cancer Paternal Aunt     Diabetes Brother     High Blood Pressure Brother      Social History     Socioeconomic History    Marital status: Single     Spouse name: Not on file    Number of children: Not on file    Years of education: Not on file    Highest education level: Not on file   Occupational History    Not on file   Tobacco Use    Smoking status: Former Smoker     Packs/day: 0.25     Types: Cigarettes     Start date: 3/1/2020    Smokeless tobacco: Never Used   Vaping Use    Vaping Use: Never used   Substance and Sexual Activity    Alcohol use: Yes     Comment: 5 x a year. Caffeine: pepsi (sometimes), decaf tea     Drug use: Yes     Types: Marijuana Valdene Smith)     Comment: 2 times per month -medical cannabis, edibles    Sexual activity: Not Currently   Other Topics Concern    Not on file   Social History Narrative    Not on file     Social Determinants of Health     Financial Resource Strain:     Difficulty of Paying Living Expenses: Not on file   Food Insecurity:     Worried About Running Out of Food in the Last Year: Not on file    Rinku of Food in the Last Year: Not on file   Transportation Needs:     Lack of Transportation (Medical): Not on file    Lack of Transportation (Non-Medical):  Not on file   Physical Activity:     Days of Exercise per Week: Not on file    Minutes of Exercise per Session: Not on file   Stress:     Feeling of Stress : Not on file   Social Connections:     Frequency of Communication with Friends and Family: Not on file    Frequency of Social Gatherings with Friends and Family: Not on file    Attends Evangelical Services: Not on file    Active Member of Clubs or Organizations: Not on file    Attends Club or Organization Meetings: Not on file    Marital Status: Not on file   Intimate Partner Violence:     Fear of Current or Ex-Partner: Not on file    Emotionally Abused: Not on file    Physically Abused: Not on file    Sexually Abused: Not on file   Housing Stability:     Unable to Pay for Housing in the Last Year: Not on file    Number of [place] in the Last Year: Not on file    Unstable Housing in the Last Year: Not on file       Current Outpatient Medications   Medication Sig Dispense Refill    triamcinolone (KENALOG) 0.1 % cream       lisinopril (PRINIVIL;ZESTRIL) 20 MG tablet Take 1 tablet by mouth daily 10mg daily (Patient taking differently: Take 20 mg by mouth daily 1/2 tablet 2 times daily) 90 tablet 3    metoprolol succinate (TOPROL XL) 25 MG extended release tablet Take 1 tablet by mouth daily (Patient taking differently: Take 25 mg by mouth 2 times daily ) 30 tablet 3    simvastatin (ZOCOR) 20 MG tablet Take 1 tablet by mouth nightly 90 tablet 3    famotidine (PEPCID) 10 MG tablet Take 10 mg by mouth daily       lidocaine-prilocaine (EMLA) 2.5-2.5 % cream Apply topically as needed. 1 Tube 3    diphenhydrAMINE (BENADRYL) 50 MG capsule 50 mg 1 hour prior to procedure (Patient taking differently: No sig reported) 2 capsule 0    nitroGLYCERIN (NITROSTAT) 0.4 MG SL tablet Place 1 tablet under the tongue every 5 minutes as needed for Chest pain 25 tablet 3    VENTOLIN  (90 Base) MCG/ACT inhaler INHALE 2 PUFFS INTO THE LUNGS DAILY AS NEEDED  3    pantoprazole (PROTONIX) 40 MG tablet TAKE 1 TABLET BY MOUTH EVERY DAY  2    dicyclomine (BENTYL) 20 MG tablet Take 20 mg by mouth every 6 hours       loratadine (CLARITIN) 10 MG tablet Take 10 mg by mouth daily.       aspirin EC 81 MG EC tablet Take 1 tablet by mouth daily. (Patient taking differently: Take 81 mg by mouth daily Coated aspirin) 30 tablet 3    triamterene-hydrochlorothiazide (MAXZIDE) 75-50 MG per tablet Take 0.5 tablets by mouth as needed 0.5 tablet 2 times weekly      meclizine (ANTIVERT) 12.5 MG tablet TAKE 1 TABLET BY MOUTH EVERY 6 HOURS AS NEEDED FOR DIZZINESS (Patient not taking: Reported on 10/25/2021)  2     No current facility-administered medications for this visit. Allergies   Allergen Reactions    Pregabalin Shortness Of Breath    Hydromorphone Hcl Itching    Codeine Itching    Aspirin Rash     Needs to enteric coating      Diatrizoate Rash    Gabapentin Nausea And Vomiting    Ibuprofen Rash    Iodine Rash       Review of Systems:         Review of Systems   Constitutional: Negative for chills and fever. HENT: Negative for ear pain, mouth sores, sore throat and tinnitus. Eyes: Negative for photophobia, redness and itching. Respiratory: Negative for apnea, choking and stridor. Cardiovascular: Negative for chest pain and palpitations. Gastrointestinal: Negative for anal bleeding, constipation and rectal pain. Endocrine: Negative for polydipsia. Genitourinary: Negative for enuresis, flank pain and hematuria. Musculoskeletal: Negative for back pain, joint swelling and myalgias. Skin: Negative for color change and pallor. Allergic/Immunologic: Negative for environmental allergies. Neurological: Negative for syncope and speech difficulty. Psychiatric/Behavioral: Negative for confusion and hallucinations. OBJECTIVE:  Physical Exam:     BP (!) 162/80 (Site: Left Upper Arm, Position: Sitting, Cuff Size: Large Adult)   Pulse 59   Ht 5' 7\" (1.702 m)   Wt 222 lb 8 oz (100.9 kg)   SpO2 97%   BMI 34.85 kg/m²      Physical Exam  Constitutional:       Appearance: She is well-developed. HENT:      Head: Normocephalic. Eyes:      Pupils: Pupils are equal, round, and reactive to light.    Cardiovascular:      Rate and Rhythm: Normal rate. Pulmonary:      Effort: Pulmonary effort is normal.   Abdominal:      General: There is no distension. Palpations: Abdomen is soft. There is no mass. Tenderness: There is no abdominal tenderness. There is no guarding or rebound. Musculoskeletal:         General: Normal range of motion. Cervical back: Normal range of motion and neck supple. Skin:     General: Skin is warm. Neurological:      Mental Status: She is alert and oriented to person, place, and time. ASSESSMENT:  1. Umbilical pain          PLAN:  Treatment: CT does not show any granulation tissue but appears to be keloid. This is really bothering her from itching standpoint and will attempt laser tx. Patient counseled on risks, benefits, and alternatives of treatment plan at length today. Patient states an understanding and willingness to proceed with plan. No orders of the defined types were placed in this encounter. No orders of the defined types were placed in this encounter. Follow Up:  No follow-ups on file.         Corinne Pringle, MD

## 2021-12-08 ENCOUNTER — TELEPHONE (OUTPATIENT)
Dept: SURGERY | Age: 66
End: 2021-12-08

## 2021-12-08 NOTE — TELEPHONE ENCOUNTER
Pt called about a bill she received for her CT on 11/18/21. Denial scanned in 11/29/21. Ailyn Ceron is calling [facility] and will call pt back.

## 2021-12-10 NOTE — TELEPHONE ENCOUNTER
Called [facility] spoke w/ Zulema Rod according to her lead the appeal department is working on the appeal to [facility] currently. If insurance still denies [facility] since the test was done without authorization. She advised to tell patient not to pay bill she will be receiving a letter from them.

## 2021-12-10 NOTE — TELEPHONE ENCOUNTER
Spoke to patient gave her all information. She will not pay bill and will await letter from that department. If patient doesn't get letter she will call office and we can give her [phone] opt 1 and opt 2 to speak with authorizations.

## 2022-01-10 RX ORDER — METOPROLOL SUCCINATE 25 MG/1
25 TABLET, EXTENDED RELEASE ORAL 2 TIMES DAILY
Qty: 180 TABLET | Refills: 3 | Status: SHIPPED | OUTPATIENT
Start: 2022-01-10 | End: 2022-08-29 | Stop reason: ALTCHOICE

## 2022-01-10 NOTE — TELEPHONE ENCOUNTER
The patient called and needs a refill of her Toprol XL 20 mg . Please send to [facility] pharmacy on [address] .

## 2022-01-19 ENCOUNTER — HOSPITAL ENCOUNTER (EMERGENCY)
Age: 67
Discharge: HOME | End: 2022-01-20
Attending: EMERGENCY MEDICINE
Payer: MEDICARE

## 2022-01-19 DIAGNOSIS — U07.1 COVID-19: Primary | ICD-10-CM

## 2022-01-19 PROCEDURE — 96374 THER/PROPH/DIAG INJ IV PUSH: CPT

## 2022-01-19 PROCEDURE — 99285 EMERGENCY DEPT VISIT HI MDM: CPT

## 2022-01-20 ENCOUNTER — APPOINTMENT (OUTPATIENT)
Dept: GENERAL RADIOLOGY | Age: 67
End: 2022-01-20
Payer: MEDICARE

## 2022-01-20 VITALS
SYSTOLIC BLOOD PRESSURE: 146 MMHG | TEMPERATURE: 98.6 F | HEART RATE: 83 BPM | OXYGEN SATURATION: 96 % | BODY MASS INDEX: 33.74 KG/M2 | DIASTOLIC BLOOD PRESSURE: 62 MMHG | WEIGHT: 215 LBS | RESPIRATION RATE: 10 BRPM | HEIGHT: 67 IN

## 2022-01-20 LAB
ALBUMIN SERPL-MCNC: 3.4 GM/DL (ref 3.4–5)
ALP BLD-CCNC: 117 IU/L (ref 40–129)
ALT SERPL-CCNC: 10 U/L (ref 10–40)
ANION GAP SERPL CALCULATED.3IONS-SCNC: 15 MMOL/L (ref 4–16)
AST SERPL-CCNC: 22 IU/L (ref 15–37)
BASOPHILS ABSOLUTE: 0 K/CU MM
BASOPHILS RELATIVE PERCENT: 0.3 % (ref 0–1)
BILIRUB SERPL-MCNC: 0.3 MG/DL (ref 0–1)
BUN BLDV-MCNC: 8 MG/DL (ref 6–23)
CALCIUM SERPL-MCNC: 8.2 MG/DL (ref 8.3–10.6)
CHLORIDE BLD-SCNC: 95 MMOL/L (ref 99–110)
CO2: 21 MMOL/L (ref 21–32)
CREAT SERPL-MCNC: 0.7 MG/DL (ref 0.6–1.1)
DIFFERENTIAL TYPE: ABNORMAL
EOSINOPHILS ABSOLUTE: 0 K/CU MM
EOSINOPHILS RELATIVE PERCENT: 0 % (ref 0–3)
GFR AFRICAN AMERICAN: >60 ML/MIN/1.73M2
GFR NON-AFRICAN AMERICAN: >60 ML/MIN/1.73M2
GLUCOSE BLD-MCNC: 110 MG/DL (ref 70–99)
HCT VFR BLD CALC: 35.7 % (ref 37–47)
HEMOGLOBIN: 12.2 GM/DL (ref 12.5–16)
IMMATURE NEUTROPHIL %: 0.3 % (ref 0–0.43)
LACTATE: 0.8 MMOL/L (ref 0.4–2)
LIPASE: 7 IU/L (ref 13–60)
LYMPHOCYTES ABSOLUTE: 0.9 K/CU MM
LYMPHOCYTES RELATIVE PERCENT: 25.9 % (ref 24–44)
MCH RBC QN AUTO: 30.4 PG (ref 27–31)
MCHC RBC AUTO-ENTMCNC: 34.2 % (ref 32–36)
MCV RBC AUTO: 89 FL (ref 78–100)
MONOCYTES ABSOLUTE: 0.3 K/CU MM
MONOCYTES RELATIVE PERCENT: 8.3 % (ref 0–4)
NUCLEATED RBC %: 0 %
PDW BLD-RTO: 13.6 % (ref 11.7–14.9)
PLATELET # BLD: 161 K/CU MM (ref 140–440)
PMV BLD AUTO: 9.5 FL (ref 7.5–11.1)
POTASSIUM SERPL-SCNC: 3.8 MMOL/L (ref 3.5–5.1)
RBC # BLD: 4.01 M/CU MM (ref 4.2–5.4)
SARS-COV-2, NAAT: DETECTED
SEGMENTED NEUTROPHILS ABSOLUTE COUNT: 2.2 K/CU MM
SEGMENTED NEUTROPHILS RELATIVE PERCENT: 65.2 % (ref 36–66)
SODIUM BLD-SCNC: 131 MMOL/L (ref 135–145)
SOURCE: ABNORMAL
TOTAL IMMATURE NEUTOROPHIL: 0.01 K/CU MM
TOTAL NUCLEATED RBC: 0 K/CU MM
TOTAL PROTEIN: 6.7 GM/DL (ref 6.4–8.2)
WBC # BLD: 3.4 K/CU MM (ref 4–10.5)

## 2022-01-20 PROCEDURE — 80053 COMPREHEN METABOLIC PANEL: CPT

## 2022-01-20 PROCEDURE — 71045 X-RAY EXAM CHEST 1 VIEW: CPT

## 2022-01-20 PROCEDURE — 6370000000 HC RX 637 (ALT 250 FOR IP): Performed by: EMERGENCY MEDICINE

## 2022-01-20 PROCEDURE — 2580000003 HC RX 258: Performed by: EMERGENCY MEDICINE

## 2022-01-20 PROCEDURE — 87635 SARS-COV-2 COVID-19 AMP PRB: CPT

## 2022-01-20 PROCEDURE — 85025 COMPLETE CBC W/AUTO DIFF WBC: CPT

## 2022-01-20 PROCEDURE — 83690 ASSAY OF LIPASE: CPT

## 2022-01-20 PROCEDURE — 6360000002 HC RX W HCPCS: Performed by: EMERGENCY MEDICINE

## 2022-01-20 PROCEDURE — 83605 ASSAY OF LACTIC ACID: CPT

## 2022-01-20 RX ORDER — 0.9 % SODIUM CHLORIDE 0.9 %
1000 INTRAVENOUS SOLUTION INTRAVENOUS ONCE
Status: COMPLETED | OUTPATIENT
Start: 2022-01-20 | End: 2022-01-20

## 2022-01-20 RX ORDER — ONDANSETRON 4 MG/1
4 TABLET, ORALLY DISINTEGRATING ORAL EVERY 8 HOURS PRN
Qty: 15 TABLET | Refills: 0 | Status: SHIPPED | OUTPATIENT
Start: 2022-01-20

## 2022-01-20 RX ORDER — ACETAMINOPHEN 500 MG
1000 TABLET ORAL ONCE
Status: COMPLETED | OUTPATIENT
Start: 2022-01-20 | End: 2022-01-20

## 2022-01-20 RX ORDER — ONDANSETRON 2 MG/ML
8 INJECTION INTRAMUSCULAR; INTRAVENOUS ONCE
Status: COMPLETED | OUTPATIENT
Start: 2022-01-20 | End: 2022-01-20

## 2022-01-20 RX ADMIN — ONDANSETRON 8 MG: 2 INJECTION INTRAMUSCULAR; INTRAVENOUS at 00:48

## 2022-01-20 RX ADMIN — ACETAMINOPHEN 1000 MG: 500 TABLET ORAL at 01:42

## 2022-01-20 RX ADMIN — SODIUM CHLORIDE 1000 ML: 9 INJECTION, SOLUTION INTRAVENOUS at 00:49

## 2022-01-20 ASSESSMENT — PAIN SCALES - GENERAL
PAINLEVEL_OUTOF10: 0
PAINLEVEL_OUTOF10: 0

## 2022-01-20 NOTE — ED NOTES
Pt requesting more time for zofran to start working before PO tylenol given.       Rosa Aguiar RN  01/20/22 [id number]

## 2022-01-20 NOTE — ED PROVIDER NOTES
Triage Chief Complaint:   Fever (102.5 @ home), Nausea (since Sat), Emesis (since Sat), and Diarrhea (since Sat)    [place]:  Melissa Cox is a 78 y.o. female that presents with 5 to 6 days of fever, nausea, vomiting, diarrhea, headaches, cough, shortness of breath. States that she is unable to keep any food or fluids down. Denies any sick contacts or recent travel. She is vaccinated against COVID. Denies any chest pain or abdominal pain. States that she does have some body aches. ROS:  At least 10 systems reviewed and otherwise acutely negative except as in the [address] Past Medical History:   Diagnosis Date    Anxiety     CAD (coronary artery disease)     Chest pain     Depression     Essential hypertension 04/02/2021    GERD (gastroesophageal reflux disease)     H/O cardiovascular stress test     7/26/12-No scintigraphic evidence of inducible myocardial ischemia. Global Left ventricular sytolic function normal. Rest EF 64%. No ECG changes. Unremarkable pharmacological stress test. Normal Myocardial Perfusion Study. 3/30/11- EF70% Normal Myocardial Perfusion study. 2/18/2010 EF =>55%;1/2009 EF 65%, 1/2008, 1/2007, 5/2006, 2/2006 EF70%    H/O Doppler ultrasound 03/03/2008     Carotid Report- No Significant atherosclerotic plaque noted in the right internal carotid artery. The Doppler flow velocities w/in FREDERIC are within normal limits. There is intimal thinckening but no significant atherosclerotic plaque noted in LICA. The Dopple flow velocities w/in LICA are Within Normal Limits.  H/O echocardiogram     7/26/12- EF=>55%.  Left ventricular systolic function is ODNMOJ.0/4/5530 EF =>55%, 12/2010 EF55%;  2/14/2008    H/O echocardiogram 07/17/2017    EF >55%    H/O exercise stress test 06/22/2018    treadmill    H/O percutaneous left heart catheterization 03/11/2014    EF 55% Left anterior descending artery has patent stent, proximal LAD has eccentric lesion of 30-40% unchanged from last time, The right coronary artery is a dominant vessel with abberant take off, has 40-50% mid RCA stenosis, proximal stent is patent. No evidence of hemodynamically significant coronary artery disease, I have tried to do FFR of RCA, however, because of  aberrant take off, could no    H/O tilt table evaluation 08/18/2021    Normal response.  Hx of cardiovascular stress test 05/14/2015    cardiolite-normal,EF63%    Hx of cardiovascular stress test 03/10/2020    Normal study    Hx of cardiovascular stress test 11/16/2021    Normal study    Hx of echocardiogram 03/15/2016    EF 55%, normal LV, trivial MR & TR, mild aortic insufficiency.  Hyperlipidemia     Orthostatic hypotension     Other activity(E029.9) 09/02/2008    48 HR Holter Monitor- Predominat rhythm is sinus rhythm. No significant ectopy noted.  Post PTCA 08/23/2013    stent LAD    Post PTCA 08/13/2012    RCA 99% reduced to 0 w/ PTCA and stent w. 2.75 X 23 Xience. Left main coronary artery has no signif. dis. LAD artery has mild disease. CX artery has no signif. disease. EF 55%     Past Surgical History:   Procedure Laterality Date    CARDIAC CATHETERIZATION      4/24/2006-Left heart cath-    CARDIAC CATHETERIZATION  03/11/2014    EF 55% Left anterior descending artery has patent stent, proximal LAD has eccentric lesion of 30-40% unchanged from last time, The right coronary artery is a dominant vessel with abberant take off, has 40-50% mid RCA stenosis, proximal stent is patent.  No evidence of hemodynamically significant coronary artery disease, I have tried to do FFR of RCA, however, because of  aberrant take off, could no    CORONARY ANGIOPLASTY WITH STENT PLACEMENT  8/23/13 8/23/13 LAD 7/2012; 4/25/2006- PTCA w/ stent-> RCA    EYE SURGERY      HERNIA REPAIR  03/08/2017    HYSTERECTOMY      TONSILLECTOMY AND ADENOIDECTOMY      ULNAR TUNNEL RELEASE  11/2006    Ulnar nerve tranfer Left elbow    WRIST SURGERY  7/2008    Left wrist Family History   Problem Relation Age of Onset    Diabetes Sister     Heart Disease Sister     High Blood Pressure Sister     Diabetes Brother     Heart Disease Brother     High Blood Pressure Brother     Cancer Maternal Aunt     Cancer Paternal Aunt     Diabetes Brother     High Blood Pressure Brother      Social History     Socioeconomic History    Marital status: Single     Spouse name: Not on file    Number of children: Not on file    Years of education: Not on file    Highest education level: Not on file   Occupational History    Not on file   Tobacco Use    Smoking status: Former Smoker     Packs/day: 0.25     Types: Cigarettes     Start date: 3/1/2020    Smokeless tobacco: Never Used   Vaping Use    Vaping Use: Never used   Substance and Sexual Activity    Alcohol use: Yes     Comment: 5 x a year. Caffeine: pepsi (sometimes), decaf tea     Drug use: Yes     Types: Marijuana Alpheus Jerrica)     Comment: 2 times per month -medical cannabis, edibles    Sexual activity: Not Currently   Other Topics Concern    Not on file   Social History Narrative    Not on file     Social Determinants of Health     Financial Resource Strain:     Difficulty of Paying Living Expenses: Not on file   Food Insecurity:     Worried About Running Out of Food in the Last Year: Not on file    Rinku of Food in the Last Year: Not on file   Transportation Needs:     Lack of Transportation (Medical): Not on file    Lack of Transportation (Non-Medical):  Not on file   Physical Activity:     Days of Exercise per Week: Not on file    Minutes of Exercise per Session: Not on file   Stress:     Feeling of Stress : Not on file   Social Connections:     Frequency of Communication with Friends and Family: Not on file    Frequency of Social Gatherings with Friends and Family: Not on file    Attends Jew Services: Not on file    Active Member of Clubs or Organizations: Not on file    Attends Club or Organization Meetings: Not on file    Marital Status: Not on file   Intimate Partner Violence:     Fear of Current or Ex-Partner: Not on file    Emotionally Abused: Not on file    Physically Abused: Not on file    Sexually Abused: Not on file   Housing Stability:     Unable to Pay for Housing in the Last Year: Not on file    Number of [place] in the Last Year: Not on file    Unstable Housing in the Last Year: Not on file     No current facility-administered medications for this encounter. Current Outpatient Medications   Medication Sig Dispense Refill    ondansetron (ZOFRAN ODT) 4 MG disintegrating tablet Take 1 tablet by mouth every 8 hours as needed for Nausea 15 tablet 0    metoprolol succinate (TOPROL XL) 25 MG extended release tablet Take 1 tablet by mouth 2 times daily 180 tablet 3    triamcinolone (KENALOG) 0.1 % cream       lisinopril (PRINIVIL;ZESTRIL) 20 MG tablet Take 1 tablet by mouth daily 10mg daily (Patient taking differently: Take 20 mg by mouth daily 1/2 tablet 2 times daily) 90 tablet 3    simvastatin (ZOCOR) 20 MG tablet Take 1 tablet by mouth nightly 90 tablet 3    famotidine (PEPCID) 10 MG tablet Take 10 mg by mouth daily       lidocaine-prilocaine (EMLA) 2.5-2.5 % cream Apply topically as needed. 1 Tube 3    diphenhydrAMINE (BENADRYL) 50 MG capsule 50 mg 1 hour prior to procedure (Patient taking differently: No sig reported) 2 capsule 0    meclizine (ANTIVERT) 12.5 MG tablet TAKE 1 TABLET BY MOUTH EVERY 6 HOURS AS NEEDED FOR DIZZINESS  2    VENTOLIN  (90 Base) MCG/ACT inhaler INHALE 2 PUFFS INTO THE LUNGS DAILY AS NEEDED  3    pantoprazole (PROTONIX) 40 MG tablet TAKE 1 TABLET BY MOUTH EVERY DAY  2    dicyclomine (BENTYL) 20 MG tablet Take 20 mg by mouth every 6 hours       loratadine (CLARITIN) 10 MG tablet Take 10 mg by mouth daily.  aspirin EC 81 MG EC tablet Take 1 tablet by mouth daily.  (Patient taking differently: Take 81 mg by mouth daily Coated aspirin) 30 tablet 3    triamterene-hydrochlorothiazide (MAXZIDE) 75-50 MG per tablet Take 0.5 tablets by mouth as needed 0.5 tablet 2 times weekly      nitroGLYCERIN (NITROSTAT) 0.4 MG SL tablet Place 1 tablet under the tongue every 5 minutes as needed for Chest pain 25 tablet 3     Allergies   Allergen Reactions    Pregabalin Shortness Of Breath    Hydromorphone Hcl Itching    Codeine Itching    Aspirin Rash     Needs to enteric coating      Diatrizoate Rash    Gabapentin Nausea And Vomiting    Ibuprofen Rash    Iodine Rash       Nursing Notes Reviewed    Physical Exam:  ED Triage Vitals [01/19/22 2353]   Enc Vitals Group      BP (!) 154/73      Pulse 91      Resp 15      Temp 103 °F (39.4 °C)      Temp Source Oral      SpO2 97 %      Weight 215 lb (97.5 kg)      Height 5' 7\" (1.702 m)      Head Circumference       Peak Flow       Pain Score       Pain Loc       Pain Edu? Excl. in [address]? GENERAL APPEARANCE: Awake and alert. Cooperative. No acute distress. Appears fatigued. HEAD: Normocephalic. Atraumatic. EYES: EOM's grossly intact. Sclera anicteric. ENT: Mucous membranes are moist. Tolerates saliva. No trismus. NECK: Supple. No meningismus. Trachea midline. HEART: RRR. Radial pulses 2+. LUNGS: Respirations unlabored. CTAB  ABDOMEN: Soft. Non-tender. No guarding or rebound. EXTREMITIES: No acute deformities. SKIN: Warm and dry. NEUROLOGICAL: No gross facial drooping. Moves all 4 extremities spontaneously. PSYCHIATRIC: Normal mood.     I have reviewed and interpreted all of the currently available lab results from this visit (if applicable):  Results for orders placed or performed during the hospital encounter of 01/19/22   COVID-19, Rapid    Specimen: Nasopharyngeal   Result Value Ref Range    Source THROAT     SARS-CoV-2, NAAT DETECTED (A) NOT DETECTED   CBC Auto Differential   Result Value Ref Range    WBC 3.4 (L) 4.0 - 10.5 K/CU MM    RBC 4.01 (L) 4.2 - 5.4 M/CU MM    Hemoglobin 12.2 (L) 12.5 - 16.0 GM/DL    Hematocrit 35.7 (L) 37 - 47 %    MCV 89.0 78 - 100 FL    MCH 30.4 27 - 31 PG    MCHC 34.2 32.0 - 36.0 %    RDW 13.6 11.7 - 14.9 %    Platelets 097 035 - 211 K/CU MM    MPV 9.5 7.5 - 11.1 FL    Differential Type AUTOMATED DIFFERENTIAL     Segs Relative 65.2 36 - 66 %    Lymphocytes % 25.9 24 - 44 %    Monocytes % 8.3 (H) 0 - 4 %    Eosinophils % 0.0 0 - 3 %    Basophils % 0.3 0 - 1 %    Segs Absolute 2.2 K/CU MM    Lymphocytes Absolute 0.9 K/CU MM    Monocytes Absolute 0.3 K/CU MM    Eosinophils Absolute 0.0 K/CU MM    Basophils Absolute 0.0 K/CU MM    Nucleated RBC % 0.0 %    Total Nucleated RBC 0.0 K/CU MM    Total Immature Neutrophil 0.01 K/CU MM    Immature Neutrophil % 0.3 0 - 0.43 %   Comprehensive Metabolic Panel w/ Reflex to MG   Result Value Ref Range    Sodium 131 (L) 135 - 145 MMOL/L    Potassium 3.8 3.5 - 5.1 MMOL/L    Chloride 95 (L) 99 - 110 mMol/L    CO2 21 21 - 32 MMOL/L    BUN 8 6 - 23 MG/DL    CREATININE 0.7 0.6 - 1.1 MG/DL    Glucose 110 (H) 70 - 99 MG/DL    Calcium 8.2 (L) 8.3 - 10.6 MG/DL    Albumin 3.4 3.4 - 5.0 GM/DL    Total Protein 6.7 6.4 - 8.2 GM/DL    Total Bilirubin 0.3 0.0 - 1.0 MG/DL    ALT 10 10 - 40 U/L    AST 22 15 - 37 IU/L    Alkaline Phosphatase 117 40 - 129 IU/L    GFR Non-African American >60 >60 mL/min/1.73m2    GFR African American >60 >60 mL/min/1.73m2    Anion Gap 15 4 - 16   Lactic Acid, Plasma   Result Value Ref Range    Lactate 0.8 0.4 - 2.0 mMOL/L   Lipase   Result Value Ref Range    Lipase 7 (L) 13 - 60 IU/L      Radiographs (if obtained):  [] The following radiograph was interpreted by myself in the absence of a radiologist:  [x] Radiologist's Report Reviewed:    EKG (if obtained): (All EKG's are interpreted by myself in the absence of a cardiologist)    MDM:  Plan of care is discussed thoroughly with the patient and family if present. If performed, all imaging and lab work also discussed with patient.   All relevant prior results and chart reviewed if available. Patient presents as above. She is in no acute distress. Vital signs significant for fever but otherwise are unremarkable. Overall presentation consistent with likely viral illness, possibly COVID. She is started on IV fluids and given Zofran and Tylenol for symptoms. She has a benign abdominal exam.  Lung sounds are clear bilaterally. She is not in respiratory distress. Patient's vital signs remain normal here. Her metabolic work-up is largely unremarkable. No evidence of dehydration or electrolyte abnormalities. Chest x-ray not showing any evidence of consolidative process concerning for bacterial pneumonia at this time. Her COVID test is positive which would explain her symptoms. I feel that she is appropriate for discharge home at this time and follow-up with her PCP. I talked to the patient's more and she is actually on about day 9 of symptoms at this time. Clinical Impression:  1.  COVID-19      (Please note that portions of this note may have been completed with a voice recognition program. Efforts were made to edit the dictations but occasionally words are mis-transcribed.)    Severa Boxer, MD Catharine Frieze, MD  01/20/22 1777

## 2022-01-20 NOTE — ED NOTES
Bed: 01TR-01  Expected date:   Expected time:   Means of arrival:   Comments:  [address] [place] [address]  01/19/22 1695

## 2022-01-21 ENCOUNTER — CARE COORDINATION (OUTPATIENT)
Dept: CARE COORDINATION | Age: 67
End: 2022-01-21

## 2022-01-24 NOTE — CARE COORDINATION
Ambulatory Care Coordination ED COVID Follow up Call    Challenges to be reviewed by the provider   Additional needs identified to be addressed with provider: Yes  Pt needs follow up with PCP. Encounter was not routed to provider for escalation. Method of communication with provider: PCP is [facility] provider. Pt states she will contact. Discussed COVID-19 related testing which was: available at this time. Test results were: positive. Patient informed of results, if available? Yes. Current Symptoms: fatigue, pain or aching joints, cough, nausea, no new symptoms and no worsening symptoms    Reviewed New or Changed Meds: yes    Do you have what you need at home?  Durable Medical Equipment ordered at discharge: None   Home Health/Outpatient orders at discharge: none   Was patient discharged with a pulse oximeter? No Discussed and confirmed pulse oximeter discharge instructions and when to notify provider or seek emergency care. Patient education provided: Reviewed appropriate site of care based on symptoms and resources available to patient including: PCP, Urgent care clinics and When to call 911. Follow up appointment recommended: yes. If no appointment scheduled, scheduling offered: no  Future Appointments   Date Time Provider Russel Pablo   5/19/2022 11:40 AM Yuli Tirado MD [facility]       Interventions: Obtained and reviewed discharge summary and/or continuity of care documents  Reviewed discharge instructions, medical action plan and red flags with patient who verbalized understanding. Plan for follow-up call in 5-7 days based on severity of symptoms and risk factors. Plan for next call: symptom management-fatigue, cough, body aches, nausea  Provided contact information for future needs. Dima Paul RN     ACM call to pt regarding ER / Covid follow up. Pt states she continues to feel bad. States vomiting has stopped.  Was not able to get medication prescribed at pharmacy (Zofran). Pt has not scheduled PCP follow up appt. States she will call to schedule, but has not felt good. Encouraged to schedule as virtual appt. Declined to reviewed ACP. States she has not been eating much. Encouraged to contact PCP for follow up or return to ER/911 if symptoms worsen. Pt voiced understanding. Reviewed with pt:  -Covid test / isolation  -Scheduling PCP appt for follow up  -Staying hydrated, deep breathing exercises, ambulating as tolerated, resting  -When to return to ER -dyspnea  -Pt agreeable to ACM outreach next week.

## 2022-01-24 NOTE — ACP (ADVANCE CARE PLANNING)
Advance Care Planning   Advance Care Planning (ACP)     Attempted to discuss ACP with Ms. Hector Mathis today, she declines to discuss at this time. Will readdress at future visit.

## 2022-01-31 ENCOUNTER — CARE COORDINATION (OUTPATIENT)
Dept: CARE COORDINATION | Age: 67
End: 2022-01-31

## 2022-01-31 NOTE — CARE COORDINATION
ACM call to pt regarding ER / Covid follow up. No answer. Left message for pt requesting return call to ACM.

## 2022-02-01 NOTE — CARE COORDINATION
Second attempt. [facility] call to pt regarding ER / Covid follow up. No answer. Left message for pt requesting return call to AC. No further outreach to be completed.

## 2022-02-10 ENCOUNTER — APPOINTMENT (OUTPATIENT)
Dept: NUCLEAR MEDICINE | Age: 67
End: 2022-02-10
Payer: MEDICARE

## 2022-02-10 ENCOUNTER — HOSPITAL ENCOUNTER (OUTPATIENT)
Age: 67
Setting detail: OBSERVATION
Discharge: OTHER INSTITUTION | End: 2022-02-11
Attending: EMERGENCY MEDICINE | Admitting: GENERAL PRACTICE
Payer: MEDICARE

## 2022-02-10 ENCOUNTER — APPOINTMENT (OUTPATIENT)
Dept: GENERAL RADIOLOGY | Age: 67
End: 2022-02-10
Payer: MEDICARE

## 2022-02-10 DIAGNOSIS — F41.9 ANXIETY: ICD-10-CM

## 2022-02-10 DIAGNOSIS — R77.8 ELEVATED TROPONIN: ICD-10-CM

## 2022-02-10 DIAGNOSIS — R07.9 CHEST PAIN, UNSPECIFIED TYPE: Primary | ICD-10-CM

## 2022-02-10 PROBLEM — R79.89 ELEVATED TROPONIN: Status: ACTIVE | Noted: 2022-02-10

## 2022-02-10 LAB
ALBUMIN SERPL-MCNC: 3.5 GM/DL (ref 3.4–5)
ALP BLD-CCNC: 90 IU/L (ref 40–129)
ALT SERPL-CCNC: 23 U/L (ref 10–40)
ANION GAP SERPL CALCULATED.3IONS-SCNC: 16 MMOL/L (ref 4–16)
APTT: 27.8 SECONDS (ref 25.1–37.1)
APTT: 86 SECONDS (ref 25.1–37.1)
AST SERPL-CCNC: 17 IU/L (ref 15–37)
BASE EXCESS: 1 (ref 0–2.4)
BASE EXCESS: 1 (ref 0–2.4)
BASOPHILS ABSOLUTE: 0 K/CU MM
BASOPHILS RELATIVE PERCENT: 0.2 % (ref 0–1)
BILIRUB SERPL-MCNC: 0.5 MG/DL (ref 0–1)
BUN BLDV-MCNC: 15 MG/DL (ref 6–23)
CALCIUM SERPL-MCNC: 8.9 MG/DL (ref 8.3–10.6)
CARBON MONOXIDE, BLOOD: 6.1 % (ref 0–5)
CHLORIDE BLD-SCNC: 103 MMOL/L (ref 99–110)
CO2: 19 MMOL/L (ref 21–32)
COMMENT: ABNORMAL
COMMENT: ABNORMAL
CREAT SERPL-MCNC: 0.7 MG/DL (ref 0.6–1.1)
DIFFERENTIAL TYPE: ABNORMAL
EOSINOPHILS ABSOLUTE: 0.1 K/CU MM
EOSINOPHILS RELATIVE PERCENT: 1 % (ref 0–3)
GFR AFRICAN AMERICAN: >60 ML/MIN/1.73M2
GFR NON-AFRICAN AMERICAN: >60 ML/MIN/1.73M2
GLUCOSE BLD-MCNC: 110 MG/DL (ref 70–99)
HCO3 VENOUS: 20.4 MMOL/L (ref 19–25)
HCO3 VENOUS: 24.2 MMOL/L (ref 19–25)
HCT VFR BLD CALC: 33.3 % (ref 37–47)
HCT VFR BLD CALC: 39.1 % (ref 37–47)
HEMOGLOBIN: 11.2 GM/DL (ref 12.5–16)
HEMOGLOBIN: 13.2 GM/DL (ref 12.5–16)
IMMATURE NEUTROPHIL %: 0.3 % (ref 0–0.43)
LACTATE: 1.5 MMOL/L (ref 0.4–2)
LACTATE: 4.5 MMOL/L (ref 0.4–2)
LV EF: 60 %
LV EF: 64 %
LVEF MODALITY: NORMAL
LVEF MODALITY: NORMAL
LYMPHOCYTES ABSOLUTE: 3.2 K/CU MM
LYMPHOCYTES RELATIVE PERCENT: 37 % (ref 24–44)
MCH RBC QN AUTO: 31 PG (ref 27–31)
MCH RBC QN AUTO: 31.2 PG (ref 27–31)
MCHC RBC AUTO-ENTMCNC: 33.6 % (ref 32–36)
MCHC RBC AUTO-ENTMCNC: 33.8 % (ref 32–36)
MCV RBC AUTO: 91.8 FL (ref 78–100)
MCV RBC AUTO: 92.8 FL (ref 78–100)
MONOCYTES ABSOLUTE: 0.8 K/CU MM
MONOCYTES RELATIVE PERCENT: 9.3 % (ref 0–4)
NUCLEATED RBC %: 0 %
O2 SAT, VEN: 76.3 % (ref 50–70)
O2 SAT, VEN: 89.8 % (ref 50–70)
PCO2, VEN: 25 MMHG (ref 38–52)
PCO2, VEN: 39 MMHG (ref 38–52)
PDW BLD-RTO: 15.9 % (ref 11.7–14.9)
PDW BLD-RTO: 15.9 % (ref 11.7–14.9)
PH VENOUS: 7.4 (ref 7.32–7.42)
PH VENOUS: 7.52 (ref 7.32–7.42)
PLATELET # BLD: 209 K/CU MM (ref 140–440)
PLATELET # BLD: 261 K/CU MM (ref 140–440)
PMV BLD AUTO: 10 FL (ref 7.5–11.1)
PMV BLD AUTO: 10.2 FL (ref 7.5–11.1)
PO2, VEN: 43 MMHG (ref 28–48)
PO2, VEN: 67 MMHG (ref 28–48)
POTASSIUM SERPL-SCNC: 3.9 MMOL/L (ref 3.5–5.1)
RBC # BLD: 3.59 M/CU MM (ref 4.2–5.4)
RBC # BLD: 4.26 M/CU MM (ref 4.2–5.4)
SEGMENTED NEUTROPHILS ABSOLUTE COUNT: 4.5 K/CU MM
SEGMENTED NEUTROPHILS RELATIVE PERCENT: 52.2 % (ref 36–66)
SODIUM BLD-SCNC: 138 MMOL/L (ref 135–145)
TOTAL IMMATURE NEUTOROPHIL: 0.03 K/CU MM
TOTAL NUCLEATED RBC: 0 K/CU MM
TOTAL PROTEIN: 5.9 GM/DL (ref 6.4–8.2)
TROPONIN T: 0.01 NG/ML
TROPONIN T: 0.02 NG/ML
TROPONIN T: 0.05 NG/ML
WBC # BLD: 8 K/CU MM (ref 4–10.5)
WBC # BLD: 8.7 K/CU MM (ref 4–10.5)

## 2022-02-10 PROCEDURE — 99214 OFFICE O/P EST MOD 30 MIN: CPT | Performed by: INTERNAL MEDICINE

## 2022-02-10 PROCEDURE — 93017 CV STRESS TEST TRACING ONLY: CPT

## 2022-02-10 PROCEDURE — 78452 HT MUSCLE IMAGE SPECT MULT: CPT

## 2022-02-10 PROCEDURE — 85025 COMPLETE CBC W/AUTO DIFF WBC: CPT

## 2022-02-10 PROCEDURE — 85730 THROMBOPLASTIN TIME PARTIAL: CPT

## 2022-02-10 PROCEDURE — 71045 X-RAY EXAM CHEST 1 VIEW: CPT

## 2022-02-10 PROCEDURE — 93306 TTE W/DOPPLER COMPLETE: CPT

## 2022-02-10 PROCEDURE — G0378 HOSPITAL OBSERVATION PER HR: HCPCS

## 2022-02-10 PROCEDURE — 82375 ASSAY CARBOXYHB QUANT: CPT

## 2022-02-10 PROCEDURE — 94640 AIRWAY INHALATION TREATMENT: CPT

## 2022-02-10 PROCEDURE — 82805 BLOOD GASES W/O2 SATURATION: CPT

## 2022-02-10 PROCEDURE — 6360000002 HC RX W HCPCS: Performed by: EMERGENCY MEDICINE

## 2022-02-10 PROCEDURE — 96375 TX/PRO/DX INJ NEW DRUG ADDON: CPT

## 2022-02-10 PROCEDURE — A9500 TC99M SESTAMIBI: HCPCS | Performed by: INTERNAL MEDICINE

## 2022-02-10 PROCEDURE — 2580000003 HC RX 258: Performed by: GENERAL PRACTICE

## 2022-02-10 PROCEDURE — 99284 EMERGENCY DEPT VISIT MOD MDM: CPT

## 2022-02-10 PROCEDURE — 84484 ASSAY OF TROPONIN QUANT: CPT

## 2022-02-10 PROCEDURE — 2580000003 HC RX 258: Performed by: EMERGENCY MEDICINE

## 2022-02-10 PROCEDURE — 3430000000 HC RX DIAGNOSTIC RADIOPHARMACEUTICAL: Performed by: INTERNAL MEDICINE

## 2022-02-10 PROCEDURE — 6360000002 HC RX W HCPCS: Performed by: PHYSICIAN ASSISTANT

## 2022-02-10 PROCEDURE — 85027 COMPLETE CBC AUTOMATED: CPT

## 2022-02-10 PROCEDURE — 83605 ASSAY OF LACTIC ACID: CPT

## 2022-02-10 PROCEDURE — 2500000003 HC RX 250 WO HCPCS: Performed by: EMERGENCY MEDICINE

## 2022-02-10 PROCEDURE — 93005 ELECTROCARDIOGRAM TRACING: CPT | Performed by: EMERGENCY MEDICINE

## 2022-02-10 PROCEDURE — 80053 COMPREHEN METABOLIC PANEL: CPT

## 2022-02-10 PROCEDURE — 96365 THER/PROPH/DIAG IV INF INIT: CPT

## 2022-02-10 PROCEDURE — 96366 THER/PROPH/DIAG IV INF ADDON: CPT

## 2022-02-10 PROCEDURE — 6360000002 HC RX W HCPCS: Performed by: INTERNAL MEDICINE

## 2022-02-10 PROCEDURE — 82803 BLOOD GASES ANY COMBINATION: CPT

## 2022-02-10 PROCEDURE — 6370000000 HC RX 637 (ALT 250 FOR IP): Performed by: GENERAL PRACTICE

## 2022-02-10 RX ORDER — ALBUTEROL SULFATE 90 UG/1
2 AEROSOL, METERED RESPIRATORY (INHALATION) 4 TIMES DAILY
Status: DISCONTINUED | OUTPATIENT
Start: 2022-02-10 | End: 2022-02-11 | Stop reason: HOSPADM

## 2022-02-10 RX ORDER — LISINOPRIL 20 MG/1
20 TABLET ORAL DAILY
Status: DISCONTINUED | OUTPATIENT
Start: 2022-02-10 | End: 2022-02-11 | Stop reason: HOSPADM

## 2022-02-10 RX ORDER — CETIRIZINE HYDROCHLORIDE 10 MG/1
10 TABLET ORAL DAILY
Status: DISCONTINUED | OUTPATIENT
Start: 2022-02-10 | End: 2022-02-11 | Stop reason: HOSPADM

## 2022-02-10 RX ORDER — AMINOPHYLLINE DIHYDRATE 25 MG/ML
75 INJECTION, SOLUTION INTRAVENOUS ONCE
Status: COMPLETED | OUTPATIENT
Start: 2022-02-10 | End: 2022-02-10

## 2022-02-10 RX ORDER — SODIUM CHLORIDE 0.9 % (FLUSH) 0.9 %
5-40 SYRINGE (ML) INJECTION EVERY 12 HOURS SCHEDULED
Status: DISCONTINUED | OUTPATIENT
Start: 2022-02-10 | End: 2022-02-11 | Stop reason: HOSPADM

## 2022-02-10 RX ORDER — HEPARIN SODIUM 10000 [USP'U]/100ML
5-30 INJECTION, SOLUTION INTRAVENOUS CONTINUOUS
Status: DISCONTINUED | OUTPATIENT
Start: 2022-02-10 | End: 2022-02-11 | Stop reason: HOSPADM

## 2022-02-10 RX ORDER — FAMOTIDINE 20 MG/1
10 TABLET, FILM COATED ORAL DAILY
Status: DISCONTINUED | OUTPATIENT
Start: 2022-02-10 | End: 2022-02-11 | Stop reason: HOSPADM

## 2022-02-10 RX ORDER — ONDANSETRON 2 MG/ML
4 INJECTION INTRAMUSCULAR; INTRAVENOUS EVERY 6 HOURS PRN
Status: DISCONTINUED | OUTPATIENT
Start: 2022-02-10 | End: 2022-02-11 | Stop reason: HOSPADM

## 2022-02-10 RX ORDER — SODIUM CHLORIDE 0.9 % (FLUSH) 0.9 %
10 SYRINGE (ML) INJECTION PRN
Status: DISCONTINUED | OUTPATIENT
Start: 2022-02-10 | End: 2022-02-11 | Stop reason: HOSPADM

## 2022-02-10 RX ORDER — ATORVASTATIN CALCIUM 20 MG/1
10 TABLET, FILM COATED ORAL DAILY
Status: DISCONTINUED | OUTPATIENT
Start: 2022-02-10 | End: 2022-02-11 | Stop reason: HOSPADM

## 2022-02-10 RX ORDER — SODIUM CHLORIDE 9 MG/ML
25 INJECTION, SOLUTION INTRAVENOUS PRN
Status: DISCONTINUED | OUTPATIENT
Start: 2022-02-10 | End: 2022-02-11 | Stop reason: HOSPADM

## 2022-02-10 RX ORDER — SODIUM CHLORIDE 0.9 % (FLUSH) 0.9 %
10 SYRINGE (ML) INJECTION EVERY 12 HOURS SCHEDULED
Status: DISCONTINUED | OUTPATIENT
Start: 2022-02-10 | End: 2022-02-11 | Stop reason: HOSPADM

## 2022-02-10 RX ORDER — HEPARIN SODIUM 1000 [USP'U]/ML
4000 INJECTION, SOLUTION INTRAVENOUS; SUBCUTANEOUS PRN
Status: DISCONTINUED | OUTPATIENT
Start: 2022-02-10 | End: 2022-02-11 | Stop reason: HOSPADM

## 2022-02-10 RX ORDER — METOPROLOL SUCCINATE 25 MG/1
25 TABLET, EXTENDED RELEASE ORAL 2 TIMES DAILY
Status: DISCONTINUED | OUTPATIENT
Start: 2022-02-10 | End: 2022-02-11 | Stop reason: HOSPADM

## 2022-02-10 RX ORDER — ACETAMINOPHEN 650 MG/1
650 SUPPOSITORY RECTAL EVERY 6 HOURS PRN
Status: DISCONTINUED | OUTPATIENT
Start: 2022-02-10 | End: 2022-02-11 | Stop reason: HOSPADM

## 2022-02-10 RX ORDER — PROMETHAZINE HYDROCHLORIDE 25 MG/1
12.5 TABLET ORAL EVERY 6 HOURS PRN
Status: DISCONTINUED | OUTPATIENT
Start: 2022-02-10 | End: 2022-02-11 | Stop reason: HOSPADM

## 2022-02-10 RX ORDER — MECLIZINE HYDROCHLORIDE 25 MG/1
12.5 TABLET ORAL 3 TIMES DAILY PRN
Status: DISCONTINUED | OUTPATIENT
Start: 2022-02-10 | End: 2022-02-11 | Stop reason: HOSPADM

## 2022-02-10 RX ORDER — NITROGLYCERIN 20 MG/100ML
5-200 INJECTION INTRAVENOUS CONTINUOUS
Status: DISCONTINUED | OUTPATIENT
Start: 2022-02-10 | End: 2022-02-11 | Stop reason: HOSPADM

## 2022-02-10 RX ORDER — ASPIRIN 81 MG/1
81 TABLET ORAL DAILY
Status: DISCONTINUED | OUTPATIENT
Start: 2022-02-10 | End: 2022-02-11 | Stop reason: HOSPADM

## 2022-02-10 RX ORDER — METHYLPREDNISOLONE SODIUM SUCCINATE 40 MG/ML
40 INJECTION, POWDER, LYOPHILIZED, FOR SOLUTION INTRAMUSCULAR; INTRAVENOUS EVERY 8 HOURS
Status: DISCONTINUED | OUTPATIENT
Start: 2022-02-10 | End: 2022-02-11 | Stop reason: HOSPADM

## 2022-02-10 RX ORDER — ALBUTEROL SULFATE 90 UG/1
2 AEROSOL, METERED RESPIRATORY (INHALATION) EVERY 6 HOURS PRN
Status: DISCONTINUED | OUTPATIENT
Start: 2022-02-10 | End: 2022-02-11 | Stop reason: HOSPADM

## 2022-02-10 RX ORDER — HEPARIN SODIUM 1000 [USP'U]/ML
2000 INJECTION, SOLUTION INTRAVENOUS; SUBCUTANEOUS PRN
Status: DISCONTINUED | OUTPATIENT
Start: 2022-02-10 | End: 2022-02-11 | Stop reason: HOSPADM

## 2022-02-10 RX ORDER — 0.9 % SODIUM CHLORIDE 0.9 %
500 INTRAVENOUS SOLUTION INTRAVENOUS ONCE
Status: COMPLETED | OUTPATIENT
Start: 2022-02-10 | End: 2022-02-10

## 2022-02-10 RX ORDER — ACETAMINOPHEN 325 MG/1
650 TABLET ORAL EVERY 6 HOURS PRN
Status: DISCONTINUED | OUTPATIENT
Start: 2022-02-10 | End: 2022-02-11 | Stop reason: HOSPADM

## 2022-02-10 RX ORDER — PANTOPRAZOLE SODIUM 40 MG/1
40 TABLET, DELAYED RELEASE ORAL
Status: DISCONTINUED | OUTPATIENT
Start: 2022-02-10 | End: 2022-02-11 | Stop reason: HOSPADM

## 2022-02-10 RX ORDER — HEPARIN SODIUM 1000 [USP'U]/ML
4000 INJECTION, SOLUTION INTRAVENOUS; SUBCUTANEOUS ONCE
Status: COMPLETED | OUTPATIENT
Start: 2022-02-10 | End: 2022-02-10

## 2022-02-10 RX ORDER — SODIUM CHLORIDE 0.9 % (FLUSH) 0.9 %
5-40 SYRINGE (ML) INJECTION PRN
Status: DISCONTINUED | OUTPATIENT
Start: 2022-02-10 | End: 2022-02-11 | Stop reason: HOSPADM

## 2022-02-10 RX ORDER — POLYETHYLENE GLYCOL 3350 17 G/17G
17 POWDER, FOR SOLUTION ORAL DAILY PRN
Status: DISCONTINUED | OUTPATIENT
Start: 2022-02-10 | End: 2022-02-11 | Stop reason: HOSPADM

## 2022-02-10 RX ORDER — ACETAMINOPHEN 325 MG/1
650 TABLET ORAL EVERY 4 HOURS PRN
Status: DISCONTINUED | OUTPATIENT
Start: 2022-02-10 | End: 2022-02-11 | Stop reason: HOSPADM

## 2022-02-10 RX ORDER — ONDANSETRON 4 MG/1
4 TABLET, ORALLY DISINTEGRATING ORAL EVERY 8 HOURS PRN
Status: DISCONTINUED | OUTPATIENT
Start: 2022-02-10 | End: 2022-02-11 | Stop reason: HOSPADM

## 2022-02-10 RX ADMIN — ALBUTEROL SULFATE 2 PUFF: 90 AEROSOL, METERED RESPIRATORY (INHALATION) at 20:05

## 2022-02-10 RX ADMIN — SODIUM CHLORIDE 500 ML: 9 INJECTION, SOLUTION INTRAVENOUS at 01:56

## 2022-02-10 RX ADMIN — REGADENOSON 0.4 MG: 0.08 INJECTION, SOLUTION INTRAVENOUS at 11:13

## 2022-02-10 RX ADMIN — HEPARIN SODIUM 11 UNITS/KG/HR: 5000 INJECTION INTRAVENOUS; SUBCUTANEOUS at 06:21

## 2022-02-10 RX ADMIN — HEPARIN SODIUM 4000 UNITS: 1000 INJECTION, SOLUTION INTRAVENOUS; SUBCUTANEOUS at 06:20

## 2022-02-10 RX ADMIN — AMINOPHYLLINE 75 MG: 25 INJECTION, SOLUTION INTRAVENOUS at 11:18

## 2022-02-10 RX ADMIN — NITROGLYCERIN 5 MCG/MIN: 20 INJECTION INTRAVENOUS at 05:14

## 2022-02-10 RX ADMIN — KIT FOR THE PREPARATION OF TECHNETIUM TC99M SESTAMIBI 30 MILLICURIE: 1 INJECTION, POWDER, LYOPHILIZED, FOR SOLUTION PARENTERAL at 13:05

## 2022-02-10 RX ADMIN — METHYLPREDNISOLONE SODIUM SUCCINATE 40 MG: 40 INJECTION, POWDER, FOR SOLUTION INTRAMUSCULAR; INTRAVENOUS at 17:01

## 2022-02-10 RX ADMIN — KIT FOR THE PREPARATION OF TECHNETIUM TC99M SESTAMIBI 10 MILLICURIE: 1 INJECTION, POWDER, LYOPHILIZED, FOR SOLUTION PARENTERAL at 13:05

## 2022-02-10 RX ADMIN — ACETAMINOPHEN 650 MG: 325 TABLET ORAL at 05:14

## 2022-02-10 RX ADMIN — SODIUM CHLORIDE, PRESERVATIVE FREE 10 ML: 5 INJECTION INTRAVENOUS at 09:00

## 2022-02-10 ASSESSMENT — PAIN SCALES - GENERAL
PAINLEVEL_OUTOF10: 2
PAINLEVEL_OUTOF10: 7
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
## 2022-02-10 NOTE — ED NOTES
Pt reports CP has improved.  Pt sts it is \"barely there\"  VSS at this time     Hai Gutierrez RN  02/10/22 0601
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
## 2022-02-10 NOTE — ED NOTES
0830 perfect serve message sent to Dr Jyoti Barbosa on in pt consult     Hugh Alaniz  02/10/22 0830  0830 Dr Jyoti Barbosa acknowledged perfect serve message.  Added to treatment team     Trekaty Metzgers  02/10/22 4808

## 2022-02-11 VITALS
RESPIRATION RATE: 16 BRPM | HEIGHT: 67 IN | OXYGEN SATURATION: 98 % | TEMPERATURE: 98 F | HEART RATE: 68 BPM | BODY MASS INDEX: 31.39 KG/M2 | WEIGHT: 200 LBS | DIASTOLIC BLOOD PRESSURE: 53 MMHG | SYSTOLIC BLOOD PRESSURE: 155 MMHG

## 2022-02-11 PROBLEM — F33.1 DEPRESSION, MAJOR, RECURRENT, MODERATE (HCC): Status: ACTIVE | Noted: 2022-02-11

## 2022-02-11 PROBLEM — F41.1 GAD (GENERALIZED ANXIETY DISORDER): Status: ACTIVE | Noted: 2022-02-11

## 2022-02-11 PROBLEM — F43.23 ADJUSTMENT DISORDER WITH MIXED ANXIETY AND DEPRESSED MOOD: Status: ACTIVE | Noted: 2022-02-11

## 2022-02-11 LAB
ALBUMIN SERPL-MCNC: 3.5 GM/DL (ref 3.4–5)
ALP BLD-CCNC: 79 IU/L (ref 40–128)
ALT SERPL-CCNC: 20 U/L (ref 10–40)
ANION GAP SERPL CALCULATED.3IONS-SCNC: 9 MMOL/L (ref 4–16)
APTT: 86 SECONDS (ref 25.1–37.1)
AST SERPL-CCNC: 18 IU/L (ref 15–37)
BASOPHILS ABSOLUTE: 0 K/CU MM
BASOPHILS RELATIVE PERCENT: 0.2 % (ref 0–1)
BILIRUB SERPL-MCNC: 0.2 MG/DL (ref 0–1)
BUN BLDV-MCNC: 13 MG/DL (ref 6–23)
CALCIUM SERPL-MCNC: 8.8 MG/DL (ref 8.3–10.6)
CHLORIDE BLD-SCNC: 104 MMOL/L (ref 99–110)
CO2: 24 MMOL/L (ref 21–32)
CREAT SERPL-MCNC: 0.6 MG/DL (ref 0.6–1.1)
DIFFERENTIAL TYPE: ABNORMAL
EKG ATRIAL RATE: 141 BPM
EKG ATRIAL RATE: 59 BPM
EKG DIAGNOSIS: NORMAL
EKG DIAGNOSIS: NORMAL
EKG P AXIS: -6 DEGREES
EKG P AXIS: 65 DEGREES
EKG P-R INTERVAL: 106 MS
EKG P-R INTERVAL: 128 MS
EKG Q-T INTERVAL: 222 MS
EKG Q-T INTERVAL: 426 MS
EKG QRS DURATION: 72 MS
EKG QRS DURATION: 76 MS
EKG QTC CALCULATION (BAZETT): 340 MS
EKG QTC CALCULATION (BAZETT): 421 MS
EKG R AXIS: -2 DEGREES
EKG R AXIS: 50 DEGREES
EKG T AXIS: 170 DEGREES
EKG T AXIS: 2 DEGREES
EKG VENTRICULAR RATE: 141 BPM
EKG VENTRICULAR RATE: 59 BPM
EOSINOPHILS ABSOLUTE: 0 K/CU MM
EOSINOPHILS RELATIVE PERCENT: 0 % (ref 0–3)
GFR AFRICAN AMERICAN: >60 ML/MIN/1.73M2
GFR NON-AFRICAN AMERICAN: >60 ML/MIN/1.73M2
GLUCOSE BLD-MCNC: 212 MG/DL (ref 70–99)
HCT VFR BLD CALC: 36.6 % (ref 37–47)
HEMOGLOBIN: 11.6 GM/DL (ref 12.5–16)
IMMATURE NEUTROPHIL %: 0.3 % (ref 0–0.43)
LYMPHOCYTES ABSOLUTE: 0.4 K/CU MM
LYMPHOCYTES RELATIVE PERCENT: 6.3 % (ref 24–44)
MCH RBC QN AUTO: 30.7 PG (ref 27–31)
MCHC RBC AUTO-ENTMCNC: 31.7 % (ref 32–36)
MCV RBC AUTO: 96.8 FL (ref 78–100)
MONOCYTES ABSOLUTE: 0.1 K/CU MM
MONOCYTES RELATIVE PERCENT: 1 % (ref 0–4)
NUCLEATED RBC %: 0 %
PDW BLD-RTO: 16.3 % (ref 11.7–14.9)
PLATELET # BLD: 199 K/CU MM (ref 140–440)
PMV BLD AUTO: 10.2 FL (ref 7.5–11.1)
POTASSIUM SERPL-SCNC: 4.1 MMOL/L (ref 3.5–5.1)
RBC # BLD: 3.78 M/CU MM (ref 4.2–5.4)
SEGMENTED NEUTROPHILS ABSOLUTE COUNT: 5.6 K/CU MM
SEGMENTED NEUTROPHILS RELATIVE PERCENT: 92.2 % (ref 36–66)
SODIUM BLD-SCNC: 137 MMOL/L (ref 135–145)
TOTAL IMMATURE NEUTOROPHIL: 0.02 K/CU MM
TOTAL NUCLEATED RBC: 0 K/CU MM
TOTAL PROTEIN: 5.6 GM/DL (ref 6.4–8.2)
WBC # BLD: 6.1 K/CU MM (ref 4–10.5)

## 2022-02-11 PROCEDURE — 80048 BASIC METABOLIC PNL TOTAL CA: CPT

## 2022-02-11 PROCEDURE — 96376 TX/PRO/DX INJ SAME DRUG ADON: CPT

## 2022-02-11 PROCEDURE — 94761 N-INVAS EAR/PLS OXIMETRY MLT: CPT

## 2022-02-11 PROCEDURE — 80053 COMPREHEN METABOLIC PANEL: CPT

## 2022-02-11 PROCEDURE — 36415 COLL VENOUS BLD VENIPUNCTURE: CPT

## 2022-02-11 PROCEDURE — 96375 TX/PRO/DX INJ NEW DRUG ADDON: CPT

## 2022-02-11 PROCEDURE — 93010 ELECTROCARDIOGRAM REPORT: CPT | Performed by: INTERNAL MEDICINE

## 2022-02-11 PROCEDURE — G0378 HOSPITAL OBSERVATION PER HR: HCPCS

## 2022-02-11 PROCEDURE — 6360000002 HC RX W HCPCS: Performed by: PHYSICIAN ASSISTANT

## 2022-02-11 PROCEDURE — 6370000000 HC RX 637 (ALT 250 FOR IP): Performed by: PHYSICIAN ASSISTANT

## 2022-02-11 PROCEDURE — 85025 COMPLETE CBC W/AUTO DIFF WBC: CPT

## 2022-02-11 PROCEDURE — 85730 THROMBOPLASTIN TIME PARTIAL: CPT

## 2022-02-11 PROCEDURE — 2580000003 HC RX 258: Performed by: PHYSICIAN ASSISTANT

## 2022-02-11 PROCEDURE — 99222 1ST HOSP IP/OBS MODERATE 55: CPT | Performed by: NURSE PRACTITIONER

## 2022-02-11 PROCEDURE — 6360000002 HC RX W HCPCS: Performed by: GENERAL PRACTICE

## 2022-02-11 RX ORDER — LORAZEPAM 2 MG/ML
0.5 INJECTION INTRAMUSCULAR EVERY 6 HOURS PRN
Status: DISCONTINUED | OUTPATIENT
Start: 2022-02-11 | End: 2022-02-11 | Stop reason: HOSPADM

## 2022-02-11 RX ORDER — LORAZEPAM 0.5 MG/1
0.5 TABLET ORAL EVERY 8 HOURS PRN
Qty: 15 TABLET | Refills: 0 | Status: SHIPPED | OUTPATIENT
Start: 2022-02-11 | End: 2022-03-13

## 2022-02-11 RX ORDER — CITALOPRAM 10 MG/1
10 TABLET ORAL DAILY
Qty: 30 TABLET | Refills: 1 | Status: SHIPPED | OUTPATIENT
Start: 2022-02-11 | End: 2022-08-29

## 2022-02-11 RX ADMIN — ATORVASTATIN CALCIUM 10 MG: 20 TABLET, FILM COATED ORAL at 01:18

## 2022-02-11 RX ADMIN — CETIRIZINE HYDROCHLORIDE 10 MG: 10 TABLET, FILM COATED ORAL at 08:31

## 2022-02-11 RX ADMIN — METHYLPREDNISOLONE SODIUM SUCCINATE 40 MG: 40 INJECTION, POWDER, FOR SOLUTION INTRAMUSCULAR; INTRAVENOUS at 08:31

## 2022-02-11 RX ADMIN — SODIUM CHLORIDE, PRESERVATIVE FREE 10 ML: 5 INJECTION INTRAVENOUS at 08:31

## 2022-02-11 RX ADMIN — LORAZEPAM 0.5 MG: 2 INJECTION INTRAMUSCULAR at 01:20

## 2022-02-11 RX ADMIN — FAMOTIDINE 10 MG: 20 TABLET, FILM COATED ORAL at 01:18

## 2022-02-11 RX ADMIN — LISINOPRIL 20 MG: 20 TABLET ORAL at 01:19

## 2022-02-11 RX ADMIN — METOPROLOL SUCCINATE 25 MG: 25 TABLET, EXTENDED RELEASE ORAL at 01:19

## 2022-02-11 RX ADMIN — PANTOPRAZOLE SODIUM 40 MG: 40 TABLET, DELAYED RELEASE ORAL at 01:22

## 2022-02-11 RX ADMIN — SODIUM CHLORIDE, PRESERVATIVE FREE 10 ML: 5 INJECTION INTRAVENOUS at 01:20

## 2022-02-11 RX ADMIN — METOPROLOL SUCCINATE 25 MG: 25 TABLET, EXTENDED RELEASE ORAL at 08:31

## 2022-02-11 RX ADMIN — LISINOPRIL 20 MG: 20 TABLET ORAL at 08:31

## 2022-02-11 RX ADMIN — PANTOPRAZOLE SODIUM 40 MG: 40 TABLET, DELAYED RELEASE ORAL at 06:49

## 2022-02-11 RX ADMIN — CETIRIZINE HYDROCHLORIDE 10 MG: 10 TABLET, FILM COATED ORAL at 01:18

## 2022-02-11 RX ADMIN — METHYLPREDNISOLONE SODIUM SUCCINATE 40 MG: 40 INJECTION, POWDER, FOR SOLUTION INTRAMUSCULAR; INTRAVENOUS at 01:20

## 2022-02-11 RX ADMIN — ASPIRIN 81 MG: 81 TABLET, COATED ORAL at 08:31

## 2022-02-11 RX ADMIN — ASPIRIN 81 MG: 81 TABLET, COATED ORAL at 01:19
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
## 2022-02-11 NOTE — PROGRESS NOTES
Outpatient Pharmacy Progress Note for Meds-to-Beds    Total number of Prescriptions Filled: 10  The following medications were dispensed to the patient during the discharge process:   triamcinopone 0.1% cream   Nitroglycerin 0.4mg SL tabs   Dicyclomine 20mg   Lisinopril 20mg   Simvastatin 20mg   Aspirin EC 81mg   Citalopram 20mg   Albuterol HFA inhaler   Ondansetron 4mg ODT   Lorazepam 0.5mg    Additional Documentation:   Medication picked up by patient's nurse.  Received 2 prescriptions for citalopram- per PCP [place] patient to be on citalopram 20mg      Thank you for letting us serve your patients.   [facility]    [address], [address] [facility]    Phone: [phone]    Fax: [phone]
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
## 2022-02-11 NOTE — CARE COORDINATION
[facility] [employer] called wanting to know where patient was b/c they had received a request for transport but unsure where patient is.

## 2022-02-14 RX ORDER — METOPROLOL SUCCINATE 25 MG/1
25 TABLET, EXTENDED RELEASE ORAL 2 TIMES DAILY
Qty: 180 TABLET | Refills: 3 | Status: CANCELLED | OUTPATIENT
Start: 2022-02-14
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
## 2022-02-14 NOTE — TELEPHONE ENCOUNTER
Patient advised per message that she has current rx at pharmacy, if rx was lost she needs to discuss with pharmacy and will most likely have to self pay til next refill is due.

## 2022-03-12 PROBLEM — R77.8 ELEVATED TROPONIN: Status: RESOLVED | Noted: 2022-02-10 | Resolved: 2022-03-12

## 2022-03-12 PROBLEM — R79.89 ELEVATED TROPONIN: Status: RESOLVED | Noted: 2022-02-10 | Resolved: 2022-03-12

## 2022-04-18 ENCOUNTER — TELEPHONE (OUTPATIENT)
Dept: CARDIOLOGY CLINIC | Age: 67
End: 2022-04-18
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
## 2022-04-21 ENCOUNTER — OFFICE VISIT (OUTPATIENT)
Dept: CARDIOLOGY CLINIC | Age: 67
End: 2022-04-21
Payer: MEDICARE

## 2022-04-21 VITALS
HEART RATE: 61 BPM | BODY MASS INDEX: 31.99 KG/M2 | HEIGHT: 67 IN | WEIGHT: 203.8 LBS | SYSTOLIC BLOOD PRESSURE: 138 MMHG | DIASTOLIC BLOOD PRESSURE: 70 MMHG

## 2022-04-21 DIAGNOSIS — R06.02 SHORTNESS OF BREATH: ICD-10-CM

## 2022-04-21 DIAGNOSIS — Z01.810 PRE-OPERATIVE CARDIOVASCULAR EXAMINATION: Primary | ICD-10-CM

## 2022-04-21 PROCEDURE — 99214 OFFICE O/P EST MOD 30 MIN: CPT | Performed by: INTERNAL MEDICINE

## 2022-04-21 NOTE — LETTER
[facility]     Dr. Nannette Montiel     Transesophageal Echocardiogram    Patient Name: Calvin Peterson   : 1955   MRN# [id number]     Date of Procedure: 22 Time: 9 AM Arrival Time: [address]: [facility])     Call to [facility] at [phone] two (2) days before your procedure      X Please do not have anything by mouth after midnight prior to or 8 hours before the procedure. X You may take your medication with a sip of water unless advised otherwise below. X After your labwork and Covid test are done, you will need to [facility] yourself until procedure. Patient Signature:____________________________           Staff Signature: _______________________________                                      [place]     Dr. Nannette Montiel           Transesophageal Echocardiogram    What is a transesophageal echocardiogram?  A transesophageal echocardiogram is a test to help your doctor look at the inside of your heart. A small device called a transducer directs sound waves toward your heart. The sound waves make a picture of the heart's valves and chambers. Your doctor may do this test to look for certain types of heart disease. Or it may be done to see how disease is affecting your heart. You will be given medicine to make you sleepy and comfortable during the test. The doctor puts a small, flexible tube into your throat and guides it to the esophagus. This is the tube that connects your mouth to your stomach. The doctor will ask you to swallow as the tube goes down. The transducer is at the tip of the tube. It gets close to your heart to make clear pictures. The doctor will look at the ultrasound pictures on a screen. You will not be able to eat or drink until the numbness from the throat spray wears off. Your throat may be sore for a few days after the test.  Follow-up care is a key part of your treatment and safety. Be sure to make and go to all appointments, and call your doctor if you are having problems. It's also a good idea to know your test results and keep a list of the medicines you take. *This consent is applicable for 30 days following patient signature*  PROCEDURAL INFORMED CONSENT FOR OPERATION / PROCEDURE  I (We)Shay authorize, Dr. Floyce Sandifer  and such assistants as may be selected by him/her, to perform the following operation/procedures:  Transesophageal Echocardiogram   Note: If unable to obtain consent prior to an emergent procedure, document the emergent reason in the medical record. This procedure has been explained to my (our) satisfaction and included in the explanation was:   A) the intended benefit, nature, and extent of the procedure to be performed;   B) the significant risks involved and the probability of success;   C) alternative procedures and methods of treatment;   D) the dangers and probable consequences of such alternatives (including no procedure or treatment); E) the expected consequences of the procedure on my future health;   F) whether other qualified individuals would be performing important surgical tasks and / or whether  would be present to advise or support the procedure. I (we) understand that there are other risks of infection and other serious complications in the pre-operative/procedural and postoperative/procedural stages of my (our) care. I (we) have asked all of the questions which I (we) thought were important in deciding whether or not to undergo treatment or diagnosis. These questions have been answered to my (our) satisfaction. I (we) understand that no assurance can be given that the procedure will be a success, and no guarantee or warranty of success has been given to me (us).    2. It has been explained to me (us) that during the course of the operation/procedure, unforeseen conditions may be revealed that necessitate extension of the original procedure(s) or different procedure(s) than those set forth in Paragraph 1. I (we) authorize and request that the above-named physician, his/her assistants or his/her designees, perform procedures as necessary and desirable if deemed to be in my (our) best interest.     3. I acknowledge that other health care personnel may be observing this procedure for the purpose of medical education or other specified purposes as may be necessary as requested and/or approved by my (our) physician. 4. I (we) consent to the disposal by the hospital Pathologist of the removed tissue, parts or organs in accordance with hospital policy. 5. I do_____ do not______ consent to the use of a local infiltration pain blocking agent that will be used by my provider/surgical provider to help alleviate pain during my procedure. 6. I do_____ do not_____ consent to an emergent blood transfusion in the case of a life-threatening situation that requires blood components to be administered. This consent is valid for 24 hours from the beginning of the procedure. 7. This patient does _____ or does not ______currently have a DNR status/order. If DNR order is in place, obtain \"Addendum to the Surgical Consent for ALL Patients with a DNR Order\" to address coral-operative status for limited intervention or DNR suspension.      8. I have read and fully understand the above Consent for Operation/Procedure and that all blanks were completed before I signed the consent.       ____________________________________ ____________ ________/_______am/pm   Signature of Patient or legal representative Printed Name / Relationship Date / Time       ____________________________________ ___________ ________/_______ am/pm   Witness to Signature     Date / Time      (If patient is unable to sign or is a minor, complete the following)     Patient is a minor, ____years of age, or unable to sign because: _____________________________________   _________________________________________________________________________________________   [facility] If a phone consent is obtained, consent will be documented by using two health care professionals, each affirming that the consenting party has no questions and gives consent for the procedure discussed with the physician/provider.  _________________________________ _______________________________ ______/_____am/pm   2nd witness to phone consent Printed name Date / Time     Informed consent:   I have provided the explanation described above in section 1 to the patient and/or legal representative. I have provided the patient and/or legal representative with an opportunity to ask any questions about the proposed operation/procedure.       ______________________________ __________________ _______/_______ am/pm   Provider Signature  Date / Time       Revised 2021         [facility]     Dr. Mary Rm       PROCEDURE TO SCHEDULE:    Transesophageal Echocardiogram    Patient Name: Chadd Madden   : 1955   MRN# [id number]    Home Phone Number: [phone]   Weight:    Wt Readings from Last 3 Encounters:   22 203 lb 12.8 oz (92.4 kg)   02/10/22 200 lb (90.7 kg)   22 215 lb (97.5 kg)        Insurance: Payor: Godfrey Choudhary / Plan: Belita Dark / Product Type: *No Product type* /     Date of Procedure: 22 Time: 9 AM Arrival Time: 730 AM    Diagnosis:  SEVERE AR  Allergies:    Allergies   Allergen Reactions    Pregabalin Shortness Of Breath    Hydromorphone Hcl Itching    Codeine Itching    Aspirin Rash     Needs to enteric coating      Diatrizoate Rash    Gabapentin Nausea And Vomiting    Ibuprofen Rash    Iodine Rash            Arnie Person, RN
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
## 2022-05-02 ENCOUNTER — HOSPITAL ENCOUNTER (OUTPATIENT)
Dept: PSYCHIATRY | Age: 67
Setting detail: THERAPIES SERIES
Discharge: HOME | End: 2022-05-02
Payer: MEDICARE

## 2022-05-02 VITALS — DIASTOLIC BLOOD PRESSURE: 63 MMHG | HEART RATE: 55 BPM | OXYGEN SATURATION: 98 % | SYSTOLIC BLOOD PRESSURE: 157 MMHG

## 2022-05-02 DIAGNOSIS — F43.23 ADJUSTMENT DISORDER WITH MIXED ANXIETY AND DEPRESSED MOOD: ICD-10-CM

## 2022-05-02 DIAGNOSIS — Z76.89 ENCOUNTER TO ESTABLISH CARE: ICD-10-CM

## 2022-05-02 DIAGNOSIS — F41.1 GAD (GENERALIZED ANXIETY DISORDER): ICD-10-CM

## 2022-05-02 DIAGNOSIS — F31.63 SEVERE MIXED BIPOLAR 1 DISORDER WITHOUT PSYCHOSIS (HCC): Primary | ICD-10-CM

## 2022-05-02 DIAGNOSIS — F33.1 DEPRESSION, MAJOR, RECURRENT, MODERATE (HCC): ICD-10-CM

## 2022-05-02 PROCEDURE — 99215 OFFICE O/P EST HI 40 MIN: CPT | Performed by: NURSE PRACTITIONER

## 2022-05-02 PROCEDURE — 90837 PSYTX W PT 60 MINUTES: CPT | Performed by: NURSE PRACTITIONER

## 2022-05-02 RX ORDER — TRAZODONE HYDROCHLORIDE 50 MG/1
50 TABLET ORAL NIGHTLY
COMMUNITY
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
## 2022-05-03 ENCOUNTER — TELEPHONE (OUTPATIENT)
Dept: CARDIOLOGY CLINIC | Age: 67
End: 2022-05-03

## 2022-05-03 NOTE — TELEPHONE ENCOUNTER
Patient very concerned about two different opinions about having a MEHUL. Also she is very concerned that she will have to pay for the procedure out of pocket. I advised patient I would talk to Dr. Sofia Penn and let her know what he says. I placed a call to Dr. Sofia Penn that went to voicemail .

## 2022-05-06 ENCOUNTER — TELEPHONE (OUTPATIENT)
Dept: CARDIOLOGY CLINIC | Age: 67
End: 2022-05-06

## 2022-05-06 NOTE — TELEPHONE ENCOUNTER
I called patient to confirm appt Monday to sign ppw for procedure. She said she is still waiting for a phone call from us to see if she is having the procedure or not. Please call patient.

## 2022-05-06 NOTE — TELEPHONE ENCOUNTER
I spoke with patient about her previous test results and about theTEE. Patient decided she will have procedure done.

## 2022-05-09 ENCOUNTER — NURSE ONLY (OUTPATIENT)
Dept: CARDIOLOGY CLINIC | Age: 67
End: 2022-05-09
Payer: MEDICARE

## 2022-05-09 ENCOUNTER — HOSPITAL ENCOUNTER (OUTPATIENT)
Age: 67
Setting detail: SPECIMEN
Discharge: HOME | End: 2022-05-09
Payer: MEDICARE

## 2022-05-09 VITALS — TEMPERATURE: 97.4 F | DIASTOLIC BLOOD PRESSURE: 70 MMHG | SYSTOLIC BLOOD PRESSURE: 138 MMHG

## 2022-05-09 DIAGNOSIS — Z01.810 PRE-OPERATIVE CARDIOVASCULAR EXAMINATION: ICD-10-CM

## 2022-05-09 PROCEDURE — U0005 INFEC AGEN DETEC AMPLI PROBE: HCPCS

## 2022-05-09 PROCEDURE — U0003 INFECTIOUS AGENT DETECTION BY NUCLEIC ACID (DNA OR RNA); SEVERE ACUTE RESPIRATORY SYNDROME CORONAVIRUS 2 (SARS-COV-2) (CORONAVIRUS DISEASE [COVID-19]), AMPLIFIED PROBE TECHNIQUE, MAKING USE OF HIGH THROUGHPUT TECHNOLOGIES AS DESCRIBED BY CMS-2020-01-R: HCPCS

## 2022-05-09 PROCEDURE — 99211 OFF/OP EST MAY X REQ PHY/QHP: CPT | Performed by: INTERNAL MEDICINE

## 2022-05-09 NOTE — PROGRESS NOTES
Performed COVID testing with oral swab for 3 seconds to throat. Per This RN. Dropped swab in labeled collection tube along with  lab order and facesheet with copy of insurance card placed in collection bag. Bag placed in biohazard bag and placed in fridge.  called for . Patient was here in office & educated on MEHUL for Dx: AS. Procedure is scheduled for 5/12/22 @ 9:00, w/arrival @ 7:30, @ [address] [place] [address]. No Pre-admission orders were given to patient. Procedure and risks were explained to patient. Consent forms were signed. Instructions were given to patient to remain NPO after midnight the night before procedure. Patient may take morning meds the morning of procedure with small amount of water. Patient is asked to call hospital @ [phone], 1 to 2 days before procedure to pre-register. Patient was notified that procedure could be delayed due to an emergency. Patient voiced understanding.  Copies of consent, pre-testing orders, & information sheet scanned into media

## 2022-05-10 LAB
SARS-COV-2: NOT DETECTED
SOURCE: NORMAL

## 2022-05-11 ENCOUNTER — TELEPHONE (OUTPATIENT)
Dept: CARDIOLOGY CLINIC | Age: 67
End: 2022-05-11

## 2022-05-11 NOTE — TELEPHONE ENCOUNTER
Reminder call. Instructions reviewed. Patient acknowledged understanding.  Reminded of F/U appointment 5/20/22 @ 11:20

## 2022-05-12 ENCOUNTER — TELEPHONE (OUTPATIENT)
Dept: CARDIOLOGY CLINIC | Age: 67
End: 2022-05-12

## 2022-05-12 ENCOUNTER — HOSPITAL ENCOUNTER (OUTPATIENT)
Dept: NON INVASIVE DIAGNOSTICS | Age: 67
Discharge: HOME | End: 2022-05-12

## 2022-05-12 VITALS
OXYGEN SATURATION: 100 % | HEART RATE: 65 BPM | RESPIRATION RATE: 13 BRPM | SYSTOLIC BLOOD PRESSURE: 152 MMHG | DIASTOLIC BLOOD PRESSURE: 58 MMHG

## 2022-05-12 DIAGNOSIS — R06.02 SHORTNESS OF BREATH: ICD-10-CM

## 2022-05-12 NOTE — TELEPHONE ENCOUNTER
[facility] called to advise that procedure (MEHUL) was cancelled and will need to be rescheduled with anesthesia. Patient rescheduled 5/17/22 @ 9:30. MEHUL w/anesthesia. [facility] to notify patient of new date. One Note updated.

## 2022-05-16 ENCOUNTER — TELEPHONE (OUTPATIENT)
Dept: CARDIOLOGY CLINIC | Age: 67
End: 2022-05-16

## 2022-05-16 ENCOUNTER — ANESTHESIA EVENT (OUTPATIENT)
Dept: NON INVASIVE DIAGNOSTICS | Age: 67
End: 2022-05-16

## 2022-05-16 ASSESSMENT — ENCOUNTER SYMPTOMS: SHORTNESS OF BREATH: 1

## 2022-05-16 NOTE — ANESTHESIA PRE PROCEDURE
Department of Anesthesiology  Preprocedure Note       Name:  Mae Howe   Age:  78 y.o.  :  1955                                          MRN:  [id number]         Date:  2022      Surgeon: * Surgery not found *    Procedure:     Medications prior to admission:   Prior to Admission medications    Medication Sig Start Date End Date Taking? Authorizing Provider   traZODone (DESYREL) 50 MG tablet Take 50 mg by mouth nightly    Historical Provider, MD   citalopram (CELEXA) 10 MG tablet Take 1 tablet by mouth daily  Patient taking differently: Take 20 mg by mouth daily  22   DENVER Cary - CNP   ondansetron (ZOFRAN ODT) 4 MG disintegrating tablet Take 1 tablet by mouth every 8 hours as needed for Nausea 22   Karli Henderson MD   metoprolol succinate (TOPROL XL) 25 MG extended release tablet Take 1 tablet by mouth 2 times daily 1/10/22   DENVER Ledesma - CNP   lisinopril (PRINIVIL;ZESTRIL) 20 MG tablet Take 1 tablet by mouth daily 10mg daily  Patient taking differently: Take 20 mg by mouth in the morning and at bedtime  21   Chidi Walsh MD   simvastatin (ZOCOR) 20 MG tablet Take 1 tablet by mouth nightly 21   Chidi Walsh MD   famotidine (PEPCID) 10 MG tablet Take 10 mg by mouth daily     Historical Provider, MD   lidocaine-prilocaine (EMLA) 2.5-2.5 % cream Apply topically as needed.  18   Miguelito Liu MD   nitroGLYCERIN (NITROSTAT) 0.4 MG SL tablet Place 1 tablet under the tongue every 5 minutes as needed for Chest pain 18   Chidi Walsh MD   VENTOLIN  (90 Base) MCG/ACT inhaler INHALE 2 PUFFS INTO THE LUNGS DAILY AS NEEDED 11/10/17   Historical Provider, MD   pantoprazole (PROTONIX) 40 MG tablet TAKE 1 TABLET BY MOUTH EVERY DAY 18   Historical Provider, MD   dicyclomine (BENTYL) 20 MG tablet Take 20 mg by mouth every 6 hours  3/7/16   Historical Provider, MD   loratadine (CLARITIN) 10 MG tablet Take 10 mg by mouth daily.    Historical Provider, MD   aspirin EC 81 MG EC tablet Take 1 tablet by mouth daily. Patient taking differently: Take 81 mg by mouth daily Coated aspirin 8/24/13   Aziaz Spring MD       Current medications:    Current Outpatient Medications   Medication Sig Dispense Refill    traZODone (DESYREL) 50 MG tablet Take 50 mg by mouth nightly      citalopram (CELEXA) 10 MG tablet Take 1 tablet by mouth daily (Patient taking differently: Take 20 mg by mouth daily ) 30 tablet 1    ondansetron (ZOFRAN ODT) 4 MG disintegrating tablet Take 1 tablet by mouth every 8 hours as needed for Nausea 15 tablet 0    metoprolol succinate (TOPROL XL) 25 MG extended release tablet Take 1 tablet by mouth 2 times daily 180 tablet 3    lisinopril (PRINIVIL;ZESTRIL) 20 MG tablet Take 1 tablet by mouth daily 10mg daily (Patient taking differently: Take 20 mg by mouth in the morning and at bedtime ) 90 tablet 3    simvastatin (ZOCOR) 20 MG tablet Take 1 tablet by mouth nightly 90 tablet 3    famotidine (PEPCID) 10 MG tablet Take 10 mg by mouth daily       lidocaine-prilocaine (EMLA) 2.5-2.5 % cream Apply topically as needed. 1 Tube 3    nitroGLYCERIN (NITROSTAT) 0.4 MG SL tablet Place 1 tablet under the tongue every 5 minutes as needed for Chest pain 25 tablet 3    VENTOLIN  (90 Base) MCG/ACT inhaler INHALE 2 PUFFS INTO THE LUNGS DAILY AS NEEDED  3    pantoprazole (PROTONIX) 40 MG tablet TAKE 1 TABLET BY MOUTH EVERY DAY  2    dicyclomine (BENTYL) 20 MG tablet Take 20 mg by mouth every 6 hours       loratadine (CLARITIN) 10 MG tablet Take 10 mg by mouth daily.  aspirin EC 81 MG EC tablet Take 1 tablet by mouth daily. (Patient taking differently: Take 81 mg by mouth daily Coated aspirin) 30 tablet 3     No current facility-administered medications for this encounter. Allergies:     Allergies   Allergen Reactions    Pregabalin Shortness Of Breath and Other (See Comments)     Severe abdominal pain    Hydromorphone Hcl Itching    Aspirin Rash     Needs to enteric coating      Codeine Itching    Diatrizoate Rash    Gabapentin Nausea And Vomiting    Ibuprofen Rash    Iodine Rash       Problem List:    Patient Active Problem List   Diagnosis Code    H/O percutaneous left heart catheterization Z98.890    CAD (coronary artery disease) I25.10    Dizziness R42    Chest pain R07.9    Generalized abdominal pain R10.84    Left kidney mass N28.89    Essential hypertension I10    Mixed hyperlipidemia E78.2    Vasovagal attack R55    Shortness of breath R06.02    Anxiety F41.9    Adjustment disorder with mixed anxiety and depressed mood F43.23    Depression, major, recurrent, moderate (HCC) F33.1    CHANTELLE (generalized anxiety disorder) F41.1    Severe mixed bipolar 1 disorder without psychosis ([facility] [address] F31.63       Past Medical History:        Diagnosis Date    Anxiety     CAD (coronary artery disease)     Chest pain     Chronic back pain     Depression     Essential hypertension 04/02/2021    GERD (gastroesophageal reflux disease)     H/O cardiovascular stress test     7/26/12-No scintigraphic evidence of inducible myocardial ischemia. Global Left ventricular sytolic function normal. Rest EF 64%. No ECG changes. Unremarkable pharmacological stress test. Normal Myocardial Perfusion Study. 3/30/11- EF70% Normal Myocardial Perfusion study. 2/18/2010 EF =>55%;1/2009 EF 65%, 1/2008, 1/2007, 5/2006, 2/2006 EF70%    H/O Doppler ultrasound 03/03/2008     Carotid Report- No Significant atherosclerotic plaque noted in the right internal carotid artery. The Doppler flow velocities w/in FREDERIC are within normal limits. There is intimal thinckening but no significant atherosclerotic plaque noted in LICA. The Dopple flow velocities w/in LICA are Within Normal Limits.  H/O echocardiogram     7/26/12- EF=>55%.  Left ventricular systolic function is BRMQPK.1/1/0996 EF =>55%, 12/2010 EF55%;  2/14/2008    H/O echocardiogram 07/17/2017    EF >55%    H/O exercise stress test 06/22/2018    treadmill    H/O percutaneous left heart catheterization 03/11/2014    EF 55% Left anterior descending artery has patent stent, proximal LAD has eccentric lesion of 30-40% unchanged from last time, The right coronary artery is a dominant vessel with abberant take off, has 40-50% mid RCA stenosis, proximal stent is patent. No evidence of hemodynamically significant coronary artery disease, I have tried to do FFR of RCA, however, because of  aberrant take off, could no    H/O tilt table evaluation 08/18/2021    Normal response.  Hx of cardiovascular stress test 05/14/2015    cardiolite-normal,EF63%    Hx of cardiovascular stress test 03/10/2020    Normal study    Hx of cardiovascular stress test 11/16/2021    Normal study    Hx of echocardiogram 03/15/2016    EF 55%, normal LV, trivial MR & TR, mild aortic insufficiency.  Hyperlipidemia     Irritable bowel syndrome     Orthostatic hypotension     Other activity(E029.9) 09/02/2008    48 HR Holter Monitor- Predominat rhythm is sinus rhythm. No significant ectopy noted.  Post PTCA 08/23/2013    stent LAD    Post PTCA 08/13/2012    RCA 99% reduced to 0 w/ PTCA and stent w. 2.75 X 23 Xience. Left main coronary artery has no signif. dis. LAD artery has mild disease. CX artery has no signif. disease. EF 55%       Past Surgical History:        Procedure Laterality Date    CARDIAC CATHETERIZATION      4/24/2006-Left heart cath-    CARDIAC CATHETERIZATION  03/11/2014    EF 55% Left anterior descending artery has patent stent, proximal LAD has eccentric lesion of 30-40% unchanged from last time, The right coronary artery is a dominant vessel with abberant take off, has 40-50% mid RCA stenosis, proximal stent is patent.  No evidence of hemodynamically significant coronary artery disease, I have tried to do FFR of RCA, however, because of  aberrant take off, could no    CORONARY ANGIOPLASTY WITH STENT PLACEMENT  8/23/13 8/23/13 LAD 7/2012; 4/25/2006- PTCA w/ stent-> RCA    EYE SURGERY Bilateral     cataracts removed    HERNIA REPAIR  93/36/8155    umbilical    HYSTERECTOMY      HYSTERECTOMY, TOTAL ABDOMINAL      TONSILLECTOMY AND ADENOIDECTOMY      ULNAR TUNNEL RELEASE  11/2006    Ulnar nerve tranfer Left elbow    WRIST SURGERY  7/2008    Left wrist       Social History:    Social History     Tobacco Use    Smoking status: Former Smoker     Packs/day: 0.25     Types: Cigarettes     Start date: 3/1/2020    Smokeless tobacco: Never Used   Substance Use Topics    Alcohol use: Yes     Comment: 5 x a year. Caffeine: pepsi (sometimes), decaf tea                                 Counseling given: Not Answered      Vital Signs (Current): There were no vitals filed for this visit.                                            BP Readings from Last 3 Encounters:   05/12/22 (!) 152/58   05/09/22 138/70   04/21/22 138/70       NPO Status:                                                                                 BMI:   Wt Readings from Last 3 Encounters:   04/21/22 203 lb 12.8 oz (92.4 kg)   02/10/22 200 lb (90.7 kg)   01/19/22 215 lb (97.5 kg)     There is no height or weight on file to calculate BMI.    CBC:   Lab Results   Component Value Date    WBC 6.1 02/11/2022    RBC 3.78 02/11/2022    HGB 11.6 02/11/2022    HCT 36.6 02/11/2022    MCV 96.8 02/11/2022    RDW 16.3 02/11/2022     02/11/2022       CMP:   Lab Results   Component Value Date     02/11/2022    K 4.1 02/11/2022     02/11/2022    CO2 24 02/11/2022    BUN 13 02/11/2022    CREATININE 0.6 02/11/2022    GFRAA >60 02/11/2022    LABGLOM >60 02/11/2022    GLUCOSE 212 02/11/2022    PROT 5.6 02/11/2022    CALCIUM 8.8 02/11/2022    BILITOT 0.2 02/11/2022    ALKPHOS 79 02/11/2022    AST 18 02/11/2022    ALT 20 02/11/2022       POC Tests: No results for input(s): POCGLU, POCNA, POCK, POCCL, POCBUN, POCHEMO, POCHCT in the last 72 hours. Coags:   Lab Results   Component Value Date    PROTIME 12.2 03/09/2014    INR 1.13 03/09/2014    APTT 86.0 02/11/2022       HCG (If Applicable): No results found for: PREGTESTUR, PREGSERUM, HCG, HCGQUANT     ABGs: No results found for: PHART, PO2ART, BNY3NDX, ZAN7JZO, BEART, R1VNPOJJ     Type & Screen (If Applicable):  No results found for: LABABO, LABRH    Drug/Infectious Status (If Applicable):  No results found for: HIV, HEPCAB    COVID-19 Screening (If Applicable):   Lab Results   Component Value Date    COVID19 NOT DETECTED 05/09/2022           Anesthesia Evaluation  Patient summary reviewed  Airway: Mallampati: III        Dental:    (+) upper dentures      Pulmonary:   (+) shortness of breath:                             Cardiovascular:    (+) hypertension:, valvular problems/murmurs: AI, CAD:,             Echocardiogram reviewed  Stress test reviewed       Beta Blocker:  Dose within 24 Hrs      ROS comment: Conclusions      Summary   Technically difficult examination. Left ventricular systolic function is normal.   Ejection fraction is visually estimated at 60%. Indeterminate diastolic function; E/A flow reversal is noted. Severe aortic regurgitation is noted. No evidence of any pericardial effusion. Signature      ------------------------------------------------------------------   Electronically signed by Mark Treadwell MD   (Interpreting physician) on 02/10/2022 at 04:38 PM     Neuro/Psych:   (+) psychiatric history:depression/anxiety              ROS comment: Bipolar with psychosis  GI/Hepatic/Renal:   (+) GERD:,           Endo/Other:                     Abdominal:             Vascular: Other Findings:           Anesthesia Plan      MAC     ASA 4       Induction: intravenous. Anesthetic plan and risks discussed with patient.                     DENVER Harry CRNA   5/16/2022

## 2022-05-17 ENCOUNTER — HOSPITAL ENCOUNTER (OUTPATIENT)
Dept: NON INVASIVE DIAGNOSTICS | Age: 67
Discharge: HOME | End: 2022-05-17
Payer: MEDICARE

## 2022-05-17 ENCOUNTER — ANESTHESIA (OUTPATIENT)
Dept: NON INVASIVE DIAGNOSTICS | Age: 67
End: 2022-05-17

## 2022-05-17 VITALS
RESPIRATION RATE: 16 BRPM | OXYGEN SATURATION: 100 % | DIASTOLIC BLOOD PRESSURE: 61 MMHG | SYSTOLIC BLOOD PRESSURE: 144 MMHG | HEART RATE: 61 BPM

## 2022-05-17 PROCEDURE — 3700000001 HC ADD 15 MINUTES (ANESTHESIA)

## 2022-05-17 PROCEDURE — 3700000000 HC ANESTHESIA ATTENDED CARE

## 2022-05-17 PROCEDURE — 7100000000 HC PACU RECOVERY - FIRST 15 MIN

## 2022-05-17 PROCEDURE — 7100000001 HC PACU RECOVERY - ADDTL 15 MIN

## 2022-05-17 PROCEDURE — 93312 ECHO TRANSESOPHAGEAL: CPT

## 2022-05-17 RX ORDER — LIDOCAINE HYDROCHLORIDE 20 MG/ML
INJECTION, SOLUTION INFILTRATION; PERINEURAL PRN
Status: DISCONTINUED | OUTPATIENT
Start: 2022-05-17 | End: 2022-05-17 | Stop reason: SDUPTHER

## 2022-05-17 RX ORDER — PROPOFOL 10 MG/ML
INJECTION, EMULSION INTRAVENOUS PRN
Status: DISCONTINUED | OUTPATIENT
Start: 2022-05-17 | End: 2022-05-17 | Stop reason: SDUPTHER

## 2022-05-17 RX ADMIN — PROPOFOL 250 MG: 10 INJECTION, EMULSION INTRAVENOUS at 09:37

## 2022-05-17 RX ADMIN — LIDOCAINE HYDROCHLORIDE 100 MG: 20 INJECTION, SOLUTION INFILTRATION; PERINEURAL at 09:37

## 2022-05-17 NOTE — ANESTHESIA POSTPROCEDURE EVALUATION
Department of Anesthesiology  Postprocedure Note    Patient: Chaim Santana  MRN: [id number]  YOB: 1955  Date of evaluation: 5/17/2022  Time:  9:57 AM     Procedure Summary     Date: 05/17/22 Room / Location: [facility] [place] [facility]    Anesthesia Start: 0929 Anesthesia Stop: 9424    Procedure: TRANSESOPHAGEAL ECHOCARDIOGRAM Diagnosis: Shortness of breath    Scheduled Providers:  Responsible Provider: DENVER Mendoza CRNA    Anesthesia Type: MAC ASA Status: 4          Anesthesia Type: MAC    Lorena Phase I:      Lorena Phase II:      Last vitals: Reviewed and per EMR flowsheets.        Anesthesia Post Evaluation    Patient location during evaluation: bedside  Patient participation: complete - patient participated  Level of consciousness: awake  Pain score: 0  Airway patency: patent  Nausea & Vomiting: no vomiting and no nausea  Complications: no  Cardiovascular status: hemodynamically stable  Respiratory status: acceptable, airway suctioned, spontaneous ventilation and room air  Hydration status: stable

## 2022-05-26 ENCOUNTER — OFFICE VISIT (OUTPATIENT)
Dept: CARDIOLOGY CLINIC | Age: 67
End: 2022-05-26
Payer: MEDICARE

## 2022-05-26 VITALS
HEART RATE: 64 BPM | HEIGHT: 67 IN | DIASTOLIC BLOOD PRESSURE: 70 MMHG | WEIGHT: 204 LBS | SYSTOLIC BLOOD PRESSURE: 100 MMHG | BODY MASS INDEX: 32.02 KG/M2

## 2022-05-26 DIAGNOSIS — I25.10 CORONARY ARTERY DISEASE INVOLVING NATIVE CORONARY ARTERY OF NATIVE HEART WITHOUT ANGINA PECTORIS: Primary | ICD-10-CM

## 2022-05-26 PROCEDURE — G8400 PT W/DXA NO RESULTS DOC: HCPCS | Performed by: INTERNAL MEDICINE

## 2022-05-26 PROCEDURE — G8428 CUR MEDS NOT DOCUMENT: HCPCS | Performed by: INTERNAL MEDICINE

## 2022-05-26 PROCEDURE — 1123F ACP DISCUSS/DSCN MKR DOCD: CPT | Performed by: INTERNAL MEDICINE

## 2022-05-26 PROCEDURE — 1036F TOBACCO NON-USER: CPT | Performed by: INTERNAL MEDICINE

## 2022-05-26 PROCEDURE — 3017F COLORECTAL CA SCREEN DOC REV: CPT | Performed by: INTERNAL MEDICINE

## 2022-05-26 PROCEDURE — 1090F PRES/ABSN URINE INCON ASSESS: CPT | Performed by: INTERNAL MEDICINE

## 2022-05-26 PROCEDURE — 99214 OFFICE O/P EST MOD 30 MIN: CPT | Performed by: INTERNAL MEDICINE

## 2022-05-26 PROCEDURE — G8417 CALC BMI ABV UP PARAM F/U: HCPCS | Performed by: INTERNAL MEDICINE

## 2022-05-26 NOTE — PATIENT INSTRUCTIONS
Please be informed that if you contact our office outside of normal business hours the physician on call cannot help with any scheduling or rescheduling issues, procedure instruction questions or any type of medication issue. We advise you for any urgent/emergency that you go to the nearest emergency room! PLEASE CALL OUR OFFICE DURING NORMAL BUSINESS HOURS    Monday - Friday   8 am to 5 pm    [address] [address]: [phone]    [place]:  [phone]  **It is YOUR responsibilty to bring medication bottles and/or updated medication list to [address] [place] [address]. This will allow us to better serve you and all your healthcare needs**  [facility] Locations - No appointment necessary. Sites open Monday to Friday. Call your preferred location for test preparation, business hours and other information you need. [facility] accepts [employer]. [place] [facility] [address] Sebastien Jeffries, [address] [facility]  Phone: [phone] [facility] [address] [facility]  [address].   Evi Jeffery, [address]  Phone: [phone]

## 2022-05-26 NOTE — PROGRESS NOTES
Jhon Walker MD        OFFICE  FOLLOWUP NOTE    Chief complaints: patient is here for management of CAD, HTN,vertigo, chest pain. ? lichen planus, COVID 19, AORTIC regurgitations    F/u after MEHUL, had moderate AR now, she required anesthesia with propofol for sedation     Subjective: patient feels better, some throat tightness, no chest pain, no shortness of breath, no dizziness, no palpitations    HPI Jonathan Gates is a 78 y. o.year old who  has a past medical history of Anxiety, CAD (coronary artery disease), Chest pain, Chronic back pain, Depression, Essential hypertension, GERD (gastroesophageal reflux disease), H/O cardiovascular stress test, H/O Doppler ultrasound, H/O echocardiogram, H/O echocardiogram, H/O exercise stress test, H/O percutaneous left heart catheterization, H/O tilt table evaluation, Hx of cardiovascular stress test, Hx of cardiovascular stress test, Hx of cardiovascular stress test, Hx of echocardiogram, Hyperlipidemia, Irritable bowel syndrome, Orthostatic hypotension, Other activity(E029.9), Post PTCA, Post PTCA, and S/P transesophageal echocardiogram (MEHUL). and presents for management of CAD, HTN,vertigo, chest pain. ?  lichen planus, COVID 19, AORTIC regurgitations, which are well controlled      Current Outpatient Medications   Medication Sig Dispense Refill    traZODone (DESYREL) 50 MG tablet Take 50 mg by mouth nightly      citalopram (CELEXA) 10 MG tablet Take 1 tablet by mouth daily (Patient taking differently: Take 20 mg by mouth daily ) 30 tablet 1    ondansetron (ZOFRAN ODT) 4 MG disintegrating tablet Take 1 tablet by mouth every 8 hours as needed for Nausea 15 tablet 0    metoprolol succinate (TOPROL XL) 25 MG extended release tablet Take 1 tablet by mouth 2 times daily 180 tablet 3    lisinopril (PRINIVIL;ZESTRIL) 20 MG tablet Take 1 tablet by mouth daily 10mg daily (Patient taking differently: Take 20 mg by mouth in the morning and at bedtime ) 90 tablet 3    simvastatin (ZOCOR) 20 MG tablet Take 1 tablet by mouth nightly 90 tablet 3    famotidine (PEPCID) 10 MG tablet Take 10 mg by mouth daily       lidocaine-prilocaine (EMLA) 2.5-2.5 % cream Apply topically as needed. 1 Tube 3    nitroGLYCERIN (NITROSTAT) 0.4 MG SL tablet Place 1 tablet under the tongue every 5 minutes as needed for Chest pain 25 tablet 3    VENTOLIN  (90 Base) MCG/ACT inhaler INHALE 2 PUFFS INTO THE LUNGS DAILY AS NEEDED  3    pantoprazole (PROTONIX) 40 MG tablet TAKE 1 TABLET BY MOUTH EVERY DAY  2    dicyclomine (BENTYL) 20 MG tablet Take 20 mg by mouth every 6 hours       loratadine (CLARITIN) 10 MG tablet Take 10 mg by mouth daily.  aspirin EC 81 MG EC tablet Take 1 tablet by mouth daily. (Patient taking differently: Take 81 mg by mouth daily Coated aspirin) 30 tablet 3     No current facility-administered medications for this visit. Allergies: Pregabalin, Hydromorphone hcl, Aspirin, Codeine, Diatrizoate, Gabapentin, Ibuprofen, and Iodine  Past Medical History:   Diagnosis Date    Anxiety     CAD (coronary artery disease)     Chest pain     Chronic back pain     Depression     Essential hypertension 04/02/2021    GERD (gastroesophageal reflux disease)     H/O cardiovascular stress test     7/26/12-No scintigraphic evidence of inducible myocardial ischemia. Global Left ventricular sytolic function normal. Rest EF 64%. No ECG changes. Unremarkable pharmacological stress test. Normal Myocardial Perfusion Study. 3/30/11- EF70% Normal Myocardial Perfusion study. 2/18/2010 EF =>55%;1/2009 EF 65%, 1/2008, 1/2007, 5/2006, 2/2006 EF70%    H/O Doppler ultrasound 03/03/2008     Carotid Report- No Significant atherosclerotic plaque noted in the right internal carotid artery. The Doppler flow velocities w/in FREDERIC are within normal limits. There is intimal thinckening but no significant atherosclerotic plaque noted in LICA.  The Dopple flow velocities w/in LICA are Within Normal Limits.  H/O echocardiogram     7/26/12- EF=>55%. Left ventricular systolic function is [facility].9/6/8540 EF =>55%, 12/2010 EF55%;  2/14/2008    H/O echocardiogram 07/17/2017    EF >55%    H/O exercise stress test 06/22/2018    treadmill    H/O percutaneous left heart catheterization 03/11/2014    EF 55% Left anterior descending artery has patent stent, proximal LAD has eccentric lesion of 30-40% unchanged from last time, The right coronary artery is a dominant vessel with abberant take off, has 40-50% mid RCA stenosis, proximal stent is patent. No evidence of hemodynamically significant coronary artery disease, I have tried to do FFR of RCA, however, because of  aberrant take off, could no    H/O tilt table evaluation 08/18/2021    Normal response.  Hx of cardiovascular stress test 05/14/2015    cardiolite-normal,EF63%    Hx of cardiovascular stress test 03/10/2020    Normal study    Hx of cardiovascular stress test 11/16/2021    Normal study    Hx of echocardiogram 03/15/2016    EF 55%, normal LV, trivial MR & TR, mild aortic insufficiency.  Hyperlipidemia     Irritable bowel syndrome     Orthostatic hypotension     Other activity(E029.9) 09/02/2008    48 HR Holter Monitor- Predominat rhythm is sinus rhythm. No significant ectopy noted.  Post PTCA 08/23/2013    stent LAD    Post PTCA 08/13/2012    RCA 99% reduced to 0 w/ PTCA and stent w. 2.75 X 23 Xience. Left main coronary artery has no signif. dis. LAD artery has mild disease. CX artery has no signif. disease. EF 55%    S/P transesophageal echocardiogram (MEHUL) 05/12/2022    Appears to be slight prolapse to the anterior mitral valve leaflet, MOD-AR & MILD MR.      Past Surgical History:   Procedure Laterality Date    CARDIAC CATHETERIZATION      4/24/2006-Left heart cath-    CARDIAC CATHETERIZATION  03/11/2014    EF 55% Left anterior descending artery has patent stent, proximal LAD has eccentric lesion of 30-40% unchanged from last time, The right coronary artery is a dominant vessel with abberant take off, has 40-50% mid RCA stenosis, proximal stent is patent. No evidence of hemodynamically significant coronary artery disease, I have tried to do FFR of RCA, however, because of  aberrant take off, could no    CORONARY ANGIOPLASTY WITH STENT PLACEMENT  8/23/13 8/23/13 LAD 7/2012; 4/25/2006- PTCA w/ stent-> RCA    EYE SURGERY Bilateral     cataracts removed    HERNIA REPAIR  47/08/8550    umbilical    HYSTERECTOMY      HYSTERECTOMY, TOTAL ABDOMINAL      TONSILLECTOMY AND ADENOIDECTOMY      ULNAR TUNNEL RELEASE  11/2006    Ulnar nerve tranfer Left elbow    WRIST SURGERY  7/2008    Left wrist     Family History   Problem Relation Age of Onset    Diabetes Sister     Heart Disease Sister     High Blood Pressure Sister     Diabetes Brother     Heart Disease Brother     High Blood Pressure Brother     Depression Brother     Anxiety Disorder Brother     Cancer Maternal Aunt     Cancer Paternal Aunt     Diabetes Brother     High Blood Pressure Brother     Arthritis Mother         polymalgia rheumatica    Heart Disease Mother     Diabetes Mother     Heart Disease Father     Diabetes Father     High Blood Pressure Father     Diabetes Maternal Grandmother     Heart Disease Maternal Grandmother     Anxiety Disorder Maternal Grandmother     No Known Problems Maternal Grandfather     Heart Disease Paternal Grandmother     Diabetes Paternal Grandmother     Other Paternal Grandfather         ended up in a wheelchair - cause unknown     Social History     Tobacco Use    Smoking status: Former Smoker     Packs/day: 0.25     Types: Cigarettes     Start date: 3/1/2020    Smokeless tobacco: Never Used   Substance Use Topics    Alcohol use: Yes     Comment: 5 x a year.  Caffeine: pepsi (sometimes), decaf tea       [unfilled]  Review of Systems:   · Constitutional: No Fever or Weight Loss   · Eyes: No Decreased Vision  · ENT: No Headaches, Hearing Loss or Vertigo  · Cardiovascular: No chest pain, dyspnea on exertion, palpitations or loss of consciousness  · Respiratory: No cough or wheezing    · Gastrointestinal: No abdominal pain, appetite loss, blood in stools, constipation, diarrhea or heartburn  · Genitourinary: No dysuria, trouble voiding, or hematuria  · Musculoskeletal:  No gait disturbance, weakness or joint complaints  · Integumentary: No rash or pruritis  · Neurological: No TIA or stroke symptoms  · Psychiatric: No anxiety or depression  · Endocrine: No malaise, fatigue or temperature intolerance  · Hematologic/Lymphatic: No bleeding problems, blood clots or swollen lymph nodes  · Allergic/Immunologic: No nasal congestion or hives  All systems negative except as marked. Objective:  /70 (Position: Standing)   Pulse 64   Ht 5' 7\" (1.702 m)   Wt 204 lb (92.5 kg)   BMI 31.95 kg/m²   Wt Readings from Last 3 Encounters:   05/26/22 204 lb (92.5 kg)   04/21/22 203 lb 12.8 oz (92.4 kg)   02/10/22 200 lb (90.7 kg)     Body mass index is 31.95 kg/m². GENERAL - Alert, oriented, pleasant, in no apparent distress,normal grooming  HEENT - pupils are intact, cornea intact, conjunctive normal color, ears are normal in exam,  Neck - Supple. No jugular venous distention noted. No carotid bruits, no apical -carotid delay  Respiratory:  Normal breath sounds, No respiratory distress, No wheezing, No chest tenderness. ,no use of accessory muscles, diaphragm movement is normal  Cardiovascular: (PMI) apex non displaced,no lifts no thrills, no s3,no s4, Normal heart rate, Normal rhythm, No murmurs, No rubs, No gallops.  Carotid arteries pulse and amplitude are normal no bruit, no abdominal bruit noted ( normal abdominal aorta ausculation),   Extremities - No cyanosis, clubbing, or significant edema, no varicose veins    Abdomen - No masses, tenderness, or organomegaly, no hepato-splenomegally, no bruits  Musculoskeletal - No significant edema, no kyphosis or scoliosis, no deformity in any extremity noted, muscle strength and tone are normal  Skin: no ulcer,no scar,no stasis dermatitis   Neurologic - alert oriented times 3,Cranial nerves II through XII are grossly intact. There were no gross focal neurologic abnormalities. Psychiatric: normal mood and affect    Lab Results   Component Value Date    TROPONINI <0.006 03/10/2014     BNP:    Lab Results   Component Value Date    BNP 33 03/09/2014     PT/INR:  No results found for: PTINR  No results found for: LABA1C  Lab Results   Component Value Date    CHOL 187 04/17/2013    TRIG 79 04/17/2013    HDL 79 (A) 04/17/2013    LDLCALC 92 04/17/2013     Lab Results   Component Value Date    ALT 20 02/11/2022    AST 18 02/11/2022     TSH:  No results found for: TSH    Impression:  Jana Cedeño is a 78 y. o.year old who  has a past medical history of Anxiety, CAD (coronary artery disease), Chest pain, Chronic back pain, Depression, Essential hypertension, GERD (gastroesophageal reflux disease), H/O cardiovascular stress test, H/O Doppler ultrasound, H/O echocardiogram, H/O echocardiogram, H/O exercise stress test, H/O percutaneous left heart catheterization, H/O tilt table evaluation, Hx of cardiovascular stress test, Hx of cardiovascular stress test, Hx of cardiovascular stress test, Hx of echocardiogram, Hyperlipidemia, Irritable bowel syndrome, Orthostatic hypotension, Other activity(E029.9), Post PTCA, Post PTCA, and S/P transesophageal echocardiogram (MEHUL). and presents with     Plan:  1. 30 LBS weight loss with omicron: stable now  2. Aortic regurgitations: it was moderate in 3/21 and her last echo in 2/22 suggested severe aortic stenosis, howvever her MEHUL suggested its still moderate, will defer surgical evaluation at this time. 3. Mild throat tighthness post MEHUL: normal,  4.  Vasovagal attacks: tilt table test was normal, she had HTN she has sweatings, she keep a fan and cold water bottle, will get records of her labs from  PMD office  5. Memory loss: recommend to see neurologist, she has problem keeping tract of her thoughts, she is on celexa and buspar  6. Leg swelling\": venous doppler ordered, recommend compression, she takes maxide as needed. 7. CAD: Continue aspirin,continue lopressor and zocor. 8. ? Anemia: will get labs from PMD office  9. Palpitations: stable  10. Dyslipidemia: continue statins  11. HTN: Relatively not controlled, increase isinopril to 20mg daily,continue lopressor  12. Umbilical hernia s/p sx. Health maintenance: exerise and   All labs, medications and tests reviewed, continue all other medications of all above medical condition listed as is.     [unfilled]

## 2022-08-16 RX ORDER — LISINOPRIL 20 MG/1
20 TABLET ORAL 2 TIMES DAILY
Qty: 180 TABLET | Refills: 3 | Status: SHIPPED | OUTPATIENT
Start: 2022-08-16 | End: 2022-08-29 | Stop reason: SDUPTHER

## 2022-08-18 ENCOUNTER — HOSPITAL ENCOUNTER (EMERGENCY)
Age: 67
Discharge: HOME | End: 2022-08-18
Payer: MEDICARE

## 2022-08-18 VITALS
HEIGHT: 67 IN | SYSTOLIC BLOOD PRESSURE: 136 MMHG | DIASTOLIC BLOOD PRESSURE: 66 MMHG | BODY MASS INDEX: 29.82 KG/M2 | OXYGEN SATURATION: 100 % | TEMPERATURE: 98.6 F | HEART RATE: 71 BPM | WEIGHT: 190 LBS | RESPIRATION RATE: 18 BRPM

## 2022-08-18 DIAGNOSIS — L81.9 DISCOLORATION OF SKIN OF FINGER: Primary | ICD-10-CM

## 2022-08-18 PROCEDURE — 99283 EMERGENCY DEPT VISIT LOW MDM: CPT

## 2022-08-19 NOTE — CARE COORDINATION
CM - Room 35 for pt being released --needing transportation home . Contacted [employer] .      Calvin Horan / Musa0 Andreina Roth

## 2022-08-19 NOTE — ED NOTES
Discharge instructions reviewed with patient and all questions addressed. Patient alert and oriented x4 at time of discharge. Patient ambulatory and steady gait.       Felipe Schwab RN  08/18/22 [id number]

## 2022-08-19 NOTE — ED PROVIDER NOTES
EMERGENCY DEPARTMENT ENCOUNTER      PCP: MD Spencer Fernández    Chief Complaint   Patient presents with    Finger Pain     Left middle finger, NKI, numbness and blanching for 1 week       This patient was not evaluated by the attending physician. I have independently evaluated this patient. HPI    Natalio Munoz is a 78 y.o. female who presents with left middle finger discoloration. Patient states she had an episode 1 week ago after carrying groceries. Patient states she had groceries wrapped around the finger and she noticed that it was discolored and appeared pale and gradually became normal.  Patient states she was carrying an laundry today and had another episode. Patient states finger is now returned to normal.  Patient denies history of Raynaud's. Patient denies chest pain, shortness of breath, abdominal pain. Patient takes baby aspirin daily. Patient denies any current pain. REVIEW OF SYSTEMS    General: Denies fever  Cardiac: Denies chest pain  Pulmonary: Denies shortness of breath  GI: Denies abdominal pain, vomiting, or diarrhea  : No dysuria or hematuria. Musculoskeletal:  Denies any other musculoskeletal pain or injuries, including chest wall and back. Lymphatics:  No swollen nodes or streaks. See HPI and nursing notes for additional information     PAST MEDICAL & SURGICAL HISTORY    Past Medical History:   Diagnosis Date    Anxiety     CAD (coronary artery disease)     Chest pain     Chronic back pain     Depression     Essential hypertension 04/02/2021    GERD (gastroesophageal reflux disease)     H/O cardiovascular stress test     7/26/12-No scintigraphic evidence of inducible myocardial ischemia. Global Left ventricular sytolic function normal. Rest EF 64%. No ECG changes. Unremarkable pharmacological stress test. Normal Myocardial Perfusion Study. 3/30/11- EF70% Normal Myocardial Perfusion study.  2/18/2010 EF =>55%;1/2009 EF 65%, 1/2008, 1/2007, 5/2006, 2/2006 EF70%    H/O Doppler ultrasound 03/03/2008     Carotid Report- No Significant atherosclerotic plaque noted in the right internal carotid artery. The Doppler flow velocities w/in FREDERIC are within normal limits. There is intimal thinckening but no significant atherosclerotic plaque noted in LICA. The Dopple flow velocities w/in LICA are Within Normal Limits. H/O echocardiogram     7/26/12- EF=>55%. Left ventricular systolic function is [id number] EF =>55%, 12/2010 EF55%;  2/14/2008    H/O echocardiogram 07/17/2017    EF >55%    H/O exercise stress test 06/22/2018    treadmill    H/O percutaneous left heart catheterization 03/11/2014    EF 55% Left anterior descending artery has patent stent, proximal LAD has eccentric lesion of 30-40% unchanged from last time, The right coronary artery is a dominant vessel with abberant take off, has 40-50% mid RCA stenosis, proximal stent is patent. No evidence of hemodynamically significant coronary artery disease, I have tried to do FFR of RCA, however, because of  aberrant take off, could no    H/O tilt table evaluation 08/18/2021    Normal response. History of transesophageal echocardiography (MEHUL) 05/17/2022    Appears to be slight prolapse to the anterior mitral valve leaflet. Hx of cardiovascular stress test 05/14/2015    cardiolite-normal,EF63%    Hx of cardiovascular stress test 03/10/2020    Normal study    Hx of cardiovascular stress test 11/16/2021    Normal study    Hx of echocardiogram 03/15/2016    EF 55%, normal LV, trivial MR & TR, mild aortic insufficiency. Hyperlipidemia     Irritable bowel syndrome     Orthostatic hypotension     Other activity(E029.9) 09/02/2008    48 HR Holter Monitor- Predominat rhythm is sinus rhythm. No significant ectopy noted. Post PTCA 08/23/2013    stent LAD    Post PTCA 08/13/2012    RCA 99% reduced to 0 w/ PTCA and stent w. 2.75 X 23 Xience. Left main coronary artery has no signif. dis.   LAD artery has mild disease. CX artery has no signif. disease. EF 55%    S/P transesophageal echocardiogram (MEHUL) 05/12/2022    Appears to be slight prolapse to the anterior mitral valve leaflet, MOD-AR & MILD MR. Past Surgical History:   Procedure Laterality Date    CARDIAC CATHETERIZATION      4/24/2006-Left heart cath-    CARDIAC CATHETERIZATION  03/11/2014    EF 55% Left anterior descending artery has patent stent, proximal LAD has eccentric lesion of 30-40% unchanged from last time, The right coronary artery is a dominant vessel with abberant take off, has 40-50% mid RCA stenosis, proximal stent is patent.  No evidence of hemodynamically significant coronary artery disease, I have tried to do FFR of RCA, however, because of  aberrant take off, could no    CORONARY ANGIOPLASTY WITH STENT PLACEMENT  8/23/13 8/23/13 LAD 7/2012; 4/25/2006- PTCA w/ stent-> RCA    EYE SURGERY Bilateral     cataracts removed    HERNIA REPAIR  63/73/0161    umbilical    HYSTERECTOMY (CERVIX STATUS UNKNOWN)      HYSTERECTOMY, TOTAL ABDOMINAL (CERVIX REMOVED)      TONSILLECTOMY AND ADENOIDECTOMY      ULNAR TUNNEL RELEASE  11/2006    Ulnar nerve tranfer Left elbow    WRIST SURGERY  7/2008    Left wrist       CURRENT MEDICATIONS    Current Outpatient Rx   Medication Sig Dispense Refill    lisinopril (PRINIVIL;ZESTRIL) 20 MG tablet Take 1 tablet by mouth in the morning and at bedtime 180 tablet 3    traZODone (DESYREL) 50 MG tablet Take 50 mg by mouth nightly      citalopram (CELEXA) 10 MG tablet Take 1 tablet by mouth daily (Patient taking differently: Take 20 mg by mouth daily ) 30 tablet 1    ondansetron (ZOFRAN ODT) 4 MG disintegrating tablet Take 1 tablet by mouth every 8 hours as needed for Nausea 15 tablet 0    metoprolol succinate (TOPROL XL) 25 MG extended release tablet Take 1 tablet by mouth 2 times daily 180 tablet 3    simvastatin (ZOCOR) 20 MG tablet Take 1 tablet by mouth nightly 90 tablet 3    famotidine (PEPCID) 10 MG tablet Take 10 mg by mouth daily       lidocaine-prilocaine (EMLA) 2.5-2.5 % cream Apply topically as needed. 1 Tube 3    nitroGLYCERIN (NITROSTAT) 0.4 MG SL tablet Place 1 tablet under the tongue every 5 minutes as needed for Chest pain 25 tablet 3    VENTOLIN  (90 Base) MCG/ACT inhaler INHALE 2 PUFFS INTO THE LUNGS DAILY AS NEEDED  3    pantoprazole (PROTONIX) 40 MG tablet TAKE 1 TABLET BY MOUTH EVERY DAY  2    dicyclomine (BENTYL) 20 MG tablet Take 20 mg by mouth every 6 hours       loratadine (CLARITIN) 10 MG tablet Take 10 mg by mouth daily. aspirin EC 81 MG EC tablet Take 1 tablet by mouth daily. (Patient taking differently: Take 81 mg by mouth daily Coated aspirin) 30 tablet 3       ALLERGIES    Allergies   Allergen Reactions    Pregabalin Shortness Of Breath and Other (See Comments)     Severe abdominal pain    Hydromorphone Hcl Itching    Aspirin Rash     Needs to enteric coating      Codeine Itching    Diatrizoate Rash    Gabapentin Nausea And Vomiting    Ibuprofen Rash    Iodine Rash       SOCIAL & FAMILY HISTORY    Social History     Socioeconomic History    Marital status: Single     Spouse name: None    Number of children: None    Years of education: None    Highest education level: None   Tobacco Use    Smoking status: Former     Packs/day: 0.25     Types: Cigarettes     Start date: 3/1/2020    Smokeless tobacco: Never   Vaping Use    Vaping Use: Never used   Substance and Sexual Activity    Alcohol use: Yes     Comment: 5 x a year.  Caffeine: pepsi (sometimes), decaf tea     Drug use: Yes     Types: Marijuana Toby Givens     Comment: 2 times per month -medical cannabis, edibles    Sexual activity: Not Currently     Family History   Problem Relation Age of Onset    Diabetes Sister     Heart Disease Sister     High Blood Pressure Sister     Diabetes Brother     Heart Disease Brother     High Blood Pressure Brother     Depression Brother     Anxiety Disorder Brother     Cancer Maternal Aunt     Cancer Paternal Aunt     Diabetes Brother     High Blood Pressure Brother     Arthritis Mother         polymalgia rheumatica    Heart Disease Mother     Diabetes Mother     Heart Disease Father     Diabetes Father     High Blood Pressure Father     Diabetes Maternal Grandmother     Heart Disease Maternal Grandmother     Anxiety Disorder Maternal Grandmother     No Known Problems Maternal Grandfather     Heart Disease Paternal Grandmother     Diabetes Paternal Grandmother     Other Paternal Grandfather         ended up in a wheelchair - cause unknown           PHYSICAL EXAM    VITAL SIGNS: /66   Pulse 71   Temp 98.6 °F (37 °C) (Oral)   Resp 18   Ht 5' 7\" (1.702 m)   Wt 190 lb (86.2 kg)   SpO2 100%   BMI 29.76 kg/m²   Constitutional:  Well developed, well nourished, no acute distress, non-toxic appearance   HENT:  Atraumatic  Musculoskeletal:   Left hand with no overlying erythema, ecchymosis, discoloration. Capillary refill intact all fingers. Radial pulses intact. No tenderness. Normal range of motion. Abdomen: Soft nontender. Integument:  Well hydrated, no rash. skin intact  Vascular: affected extremity distally neurovascularly intact - pulses, sensation, and capillary refill intact. Neurologic:  Awake alert, normal flow of speech. Cranial nerves II through XII grossly intact. Psychiatric: Cooperative, pleasant affect        ED COURSE & MEDICAL DECISION MAKING      Patient presents as above. I suspect Raynaud's. Capillary refill and pulses are intact, she has no symptoms currently. Pale discoloration was noted to the middle finger only. I do not believe patient requires imaging at this time as symptoms have resolved. Recommend follow-up with primary care provider in 2 to 3 days for recheck. Clinical  IMPRESSION    1. Discoloration of skin of finger          Diagnosis and plan discussed in detail with patient who understands and agrees.   Patient agrees to return emergency department if symptoms worsen or any new symptoms develop. Comment: Please note this report has been produced using speech recognition software and may contain errors related to that system including errors in grammar, punctuation, and spelling, as well as words and phrases that may be inappropriate. If there are any questions or concerns please feel free to contact the dictating provider for clarification.       Melony Partida PA-C  08/18/22 [id number]

## 2022-08-29 ENCOUNTER — OFFICE VISIT (OUTPATIENT)
Dept: CARDIOLOGY CLINIC | Age: 67
End: 2022-08-29
Payer: MEDICARE

## 2022-08-29 VITALS
BODY MASS INDEX: 31.92 KG/M2 | SYSTOLIC BLOOD PRESSURE: 118 MMHG | DIASTOLIC BLOOD PRESSURE: 56 MMHG | HEART RATE: 66 BPM | HEIGHT: 67 IN | WEIGHT: 203.4 LBS | OXYGEN SATURATION: 98 %

## 2022-08-29 DIAGNOSIS — R60.0 LEG EDEMA: ICD-10-CM

## 2022-08-29 DIAGNOSIS — E78.2 MIXED HYPERLIPIDEMIA: ICD-10-CM

## 2022-08-29 DIAGNOSIS — R55 VASOVAGAL ATTACK: ICD-10-CM

## 2022-08-29 DIAGNOSIS — I25.10 CORONARY ARTERY DISEASE INVOLVING NATIVE CORONARY ARTERY OF NATIVE HEART WITHOUT ANGINA PECTORIS: ICD-10-CM

## 2022-08-29 DIAGNOSIS — I35.1 NONRHEUMATIC AORTIC VALVE INSUFFICIENCY: Primary | ICD-10-CM

## 2022-08-29 DIAGNOSIS — I10 ESSENTIAL HYPERTENSION: ICD-10-CM

## 2022-08-29 PROCEDURE — 1123F ACP DISCUSS/DSCN MKR DOCD: CPT | Performed by: NURSE PRACTITIONER

## 2022-08-29 PROCEDURE — 93000 ELECTROCARDIOGRAM COMPLETE: CPT | Performed by: NURSE PRACTITIONER

## 2022-08-29 PROCEDURE — G8400 PT W/DXA NO RESULTS DOC: HCPCS | Performed by: NURSE PRACTITIONER

## 2022-08-29 PROCEDURE — G8427 DOCREV CUR MEDS BY ELIG CLIN: HCPCS | Performed by: NURSE PRACTITIONER

## 2022-08-29 PROCEDURE — 1036F TOBACCO NON-USER: CPT | Performed by: NURSE PRACTITIONER

## 2022-08-29 PROCEDURE — 99214 OFFICE O/P EST MOD 30 MIN: CPT | Performed by: NURSE PRACTITIONER

## 2022-08-29 PROCEDURE — 1090F PRES/ABSN URINE INCON ASSESS: CPT | Performed by: NURSE PRACTITIONER

## 2022-08-29 PROCEDURE — G8417 CALC BMI ABV UP PARAM F/U: HCPCS | Performed by: NURSE PRACTITIONER

## 2022-08-29 PROCEDURE — 3017F COLORECTAL CA SCREEN DOC REV: CPT | Performed by: NURSE PRACTITIONER

## 2022-08-29 RX ORDER — LISINOPRIL 20 MG/1
20 TABLET ORAL 2 TIMES DAILY
Qty: 180 TABLET | Refills: 3 | Status: SHIPPED | OUTPATIENT
Start: 2022-08-29

## 2022-08-29 RX ORDER — TRIAMTERENE AND HYDROCHLOROTHIAZIDE 37.5; 25 MG/1; MG/1
1 TABLET ORAL DAILY
COMMUNITY
End: 2022-08-29 | Stop reason: SDUPTHER

## 2022-08-29 RX ORDER — TRIAMTERENE AND HYDROCHLOROTHIAZIDE 37.5; 25 MG/1; MG/1
1 TABLET ORAL DAILY
Qty: 90 TABLET | Refills: 3 | Status: SHIPPED | OUTPATIENT
Start: 2022-08-29

## 2022-08-29 RX ORDER — DIPHENHYDRAMINE HCL 25 MG
25 TABLET ORAL AS NEEDED
COMMUNITY

## 2022-08-29 RX ORDER — SIMVASTATIN 20 MG
20 TABLET ORAL NIGHTLY
Qty: 90 TABLET | Refills: 3 | Status: SHIPPED | OUTPATIENT
Start: 2022-08-29

## 2022-08-29 NOTE — PATIENT INSTRUCTIONS
Please be informed that if you contact our office outside of normal business hours the physician on call cannot help with any scheduling or rescheduling issues, procedure instruction questions or any type of medication issue. We advise you for any urgent/emergency that you go to the nearest emergency room! PLEASE CALL OUR OFFICE DURING NORMAL BUSINESS HOURS    Monday - Friday   8 am to 5 pm    [place]: [address]: [phone]    [place]:  [phone]    **It is YOUR responsibilty to bring medication bottles and/or updated medication list to [address] [place] [address].  This will allow us to better serve you and all your healthcare needs**

## 2022-08-29 NOTE — PROGRESS NOTES
[facility] PHYSICAL [facility]  [id number]  Phone: [phone]    Fax [phone]                  Taurus Garcia MD, Hugh Garcia MD, Rozanna Shone, MD, MD Hilary Weston MD [place] Nurse, MD Manfred Vogt, DENVER Frank, DENVER Green APRN        Cardiology Progress Note      8/29/2022    RE: Chaz Pendleton  ([birth year])                             Primary cardiologist: Dr. Hilary Montero       Subjective:  CC:   1. Nonrheumatic aortic valve insufficiency    2. Coronary artery disease involving native coronary artery of native heart without angina pectoris    3. Essential hypertension    4. Vasovagal attack    5. Mixed hyperlipidemia    6. Leg edema        HPI: Chaz Pendleton, who is a  78y.o. year old female with a past medical history as listed below. Patient presents to the office for follow up on CAD (PCI of LAD in 2012 and RCA in 2013), HTN, chest pain, vasovagal attacks, and hyperlipidemia. Patient wore Holter monitor in the past which did not show any significant abnormalities but noted to have symptoms associated with sinus bradycardia. Patient has vasovagal attacks. Stress test, tilt table, and echo normal. She was recently evaluated in the emergency department due to left middle finger turning white associated with numbness. Patient was thought to have Raynaud's phenomenon. Past Medical History:   Diagnosis Date    Anxiety     CAD (coronary artery disease)     Chest pain     Chronic back pain     Depression     Essential hypertension 04/02/2021    GERD (gastroesophageal reflux disease)     H/O cardiovascular stress test     7/26/12-No scintigraphic evidence of inducible myocardial ischemia. Global Left ventricular sytolic function normal. Rest EF 64%. No ECG changes. Unremarkable pharmacological stress test. Normal Myocardial Perfusion Study.  3/30/11- EF70% Normal Myocardial Perfusion study. 2/18/2010 EF =>55%;1/2009 EF 65%, 1/2008, 1/2007, 5/2006, 2/2006 EF70%    H/O Doppler ultrasound 03/03/2008     Carotid Report- No Significant atherosclerotic plaque noted in the right internal carotid artery. The Doppler flow velocities w/in FREDERIC are within normal limits. There is intimal thinckening but no significant atherosclerotic plaque noted in LICA. The Dopple flow velocities w/in LICA are Within Normal Limits. H/O echocardiogram     7/26/12- EF=>55%. Left ventricular systolic function is BQZENE.5/1/6739 EF =>55%, 12/2010 EF55%;  2/14/2008    H/O echocardiogram 07/17/2017    EF >55%    H/O exercise stress test 06/22/2018    treadmill    H/O percutaneous left heart catheterization 03/11/2014    EF 55% Left anterior descending artery has patent stent, proximal LAD has eccentric lesion of 30-40% unchanged from last time, The right coronary artery is a dominant vessel with abberant take off, has 40-50% mid RCA stenosis, proximal stent is patent. No evidence of hemodynamically significant coronary artery disease, I have tried to do FFR of RCA, however, because of  aberrant take off, could no    H/O tilt table evaluation 08/18/2021    Normal response. History of transesophageal echocardiography (MEHUL) 05/17/2022    Appears to be slight prolapse to the anterior mitral valve leaflet. Hx of cardiovascular stress test 05/14/2015    cardiolite-normal,EF63%    Hx of cardiovascular stress test 03/10/2020    Normal study    Hx of cardiovascular stress test 11/16/2021    Normal study    Hx of echocardiogram 03/15/2016    EF 55%, normal LV, trivial MR & TR, mild aortic insufficiency. Hyperlipidemia     Irritable bowel syndrome     Orthostatic hypotension     Other activity(E029.9) 09/02/2008    48 HR Holter Monitor- Predominat rhythm is sinus rhythm. No significant ectopy noted.     Post PTCA 08/23/2013    stent LAD    Post PTCA 08/13/2012    RCA 99% reduced to 0 w/ PTCA and stent w. 2.75 X 23 Xience. Left main coronary artery has no signif. dis. LAD artery has mild disease. CX artery has no signif. disease. EF 55%    S/P transesophageal echocardiogram (MEHUL) 05/12/2022    Appears to be slight prolapse to the anterior mitral valve leaflet, MOD-AR & MILD MR. Current Outpatient Medications   Medication Sig Dispense Refill    diphenhydrAMINE (BENADRYL ALLERGY) 25 MG tablet Take 25 mg by mouth as needed for Itching      simvastatin (ZOCOR) 20 MG tablet Take 1 tablet by mouth nightly 90 tablet 3    lisinopril (PRINIVIL;ZESTRIL) 20 MG tablet Take 1 tablet by mouth in the morning and at bedtime 180 tablet 3    triamterene-hydroCHLOROthiazide (MAXZIDE-25) 37.5-25 MG per tablet Take 1 tablet by mouth daily 90 tablet 3    ondansetron (ZOFRAN ODT) 4 MG disintegrating tablet Take 1 tablet by mouth every 8 hours as needed for Nausea 15 tablet 0    famotidine (PEPCID) 10 MG tablet Take 10 mg by mouth daily       lidocaine-prilocaine (EMLA) 2.5-2.5 % cream Apply topically as needed. 1 Tube 3    nitroGLYCERIN (NITROSTAT) 0.4 MG SL tablet Place 1 tablet under the tongue every 5 minutes as needed for Chest pain 25 tablet 3    VENTOLIN  (90 Base) MCG/ACT inhaler INHALE 2 PUFFS INTO THE LUNGS DAILY AS NEEDED  3    pantoprazole (PROTONIX) 40 MG tablet TAKE 1 TABLET BY MOUTH EVERY DAY  2    dicyclomine (BENTYL) 20 MG tablet Take 20 mg by mouth every 6 hours       loratadine (CLARITIN) 10 MG tablet Take 10 mg by mouth daily. aspirin EC 81 MG EC tablet Take 1 tablet by mouth daily. (Patient taking differently: Take 81 mg by mouth daily Coated aspirin) 30 tablet 3    traZODone (DESYREL) 50 MG tablet Take 50 mg by mouth nightly (Patient not taking: Reported on 8/29/2022)       No current facility-administered medications for this visit. Review of Systems:  Review of Systems   Cardiovascular:  Positive for leg swelling. Negative for chest pain. Neurological:  Negative for dizziness. All other systems reviewed and are negative. Objective:      Physical Exam:  BP (!) 118/56 (Site: Left Upper Arm, Position: Sitting, Cuff Size: Medium Adult)   Pulse 66   Ht 5' 7\" (1.702 m)   Wt 203 lb 6.4 oz (92.3 kg)   SpO2 98%   BMI 31.86 kg/m²   Wt Readings from Last 3 Encounters:   08/29/22 203 lb 6.4 oz (92.3 kg)   08/18/22 190 lb (86.2 kg)   05/26/22 204 lb (92.5 kg)     Body mass index is 31.86 kg/m². Physical exam:  Physical Exam  Constitutional:       Appearance: She is well-developed. Cardiovascular:      Rate and Rhythm: Normal rate and regular rhythm. Pulses: Intact distal pulses. Dorsalis pedis pulses are 2+ on the right side and 2+ on the left side. Posterior tibial pulses are 2+ on the right side and 2+ on the left side. Heart sounds: Normal heart sounds, S1 normal and S2 normal.   Pulmonary:      Effort: Pulmonary effort is normal.      Breath sounds: Normal breath sounds. Musculoskeletal:         General: Normal range of motion. Right lower leg: Edema present. Left lower leg: Edema present. Skin:     General: Skin is warm and dry. Neurological:      Mental Status: She is alert and oriented to person, place, and time. DATA:  Lab Results   Component Value Date/Time    TROPONINI <0.006 03/10/2014 04:45 AM     BNP:    Lab Results   Component Value Date/Time    BNP 33 03/09/2014 04:23 PM     PT/INR:  No results found for: PTINR  No results found for: LABA1C  Lab Results   Component Value Date    CHOL 187 04/17/2013    TRIG 79 04/17/2013    HDL 79 (A) 04/17/2013    LDLCALC 92 04/17/2013     Lab Results   Component Value Date    ALT 20 02/11/2022    AST 18 02/11/2022     TSH:  No results found for: TSH    Vitals:    08/29/22 1122   BP: (!) 118/56   Pulse:    SpO2:        Echo:3/1/21   Left ventricular systolic function is normal.   Ejection fraction is visually estimated at 55-60%. Moderate aortic regurgitation; PHT: 382 msec. Sclerotic, but non-stenotic aortic valve. No evidence of any pericardial effusion. Echo:2/10/22  Technically difficult examination. Left ventricular systolic function is normal.   Ejection fraction is visually estimated at 60%. Indeterminate diastolic function; E/A flow reversal is noted. Severe aortic regurgitation is noted. No evidence of any pericardial effusion    Echo:5/17/22  No evidence of thrombus within the left atrial appendage. Negative bubble study; no ASD or PFO noted. Moderate aortic regurgitation. Appears to be slight prolapse to the anterior mitral valve leaflet. Mild mitral regurgitation. Stress Test:2/10/22  Negative for ischemia    The ASCVD Risk score (Karla James., et al., 2013) failed to calculate for the following reasons:    Cannot find a previous HDL lab    Cannot find a previous total cholesterol lab      Assessment/ Plan:     Essential hypertension   -Stable, continue with lisinopril 20 mg BID, and Maxide 37.5-25mg. Discussed possible raynaud's treatment with Norvasc if continues to reoccur. Will hold off at this time. Patient has been off Toprol XL for over a week and denies any chest pain or palpitations, will hold off on restarting medication. Mixed hyperlipidemia   -No labs completed. Will get labs from PCP. CAD (coronary artery disease)   -PCI of LAD in 2012 and RCA in 2013. Stress test and echo this year. Stress test was negative for ischemia. Primary and secondary prevention discussed with patient:   -Low sodium cardiac diet   -exercise 30 min a day three days a week    Continue current medications ASA, Lisinopril, maxide, Zocor      Aortic regurgitation  -MEHUL this year shows moderate degree when previous echo was severe aortic regurgitation. Shortness of breath  -Patient having frequent episodes of chest pain and shortness of breath along with diaphoresis. No endocrine or pulmonary disease noted. Patient has moderate aortic regurgitation.  EKG shows NSR today. Recent stress test normal.     Vasovagal attack  -Normal tilt table test.  Patient has not lost consciousness. She reports having cold sweats, chest pain and shortness of breath. Recommend deep breathing exercises. Reports episodes happen weakly. Leg edema  -L>R bilateral edema, will get venous reflux study. Recommend compression stockings and continue with Maxzide 37.5 -25 mg. Patient seen, interviewed and examined. Testing was reviewed. Patient is encouraged to exercise even a brisk walk for 30 minutes at least 3 to 4 times a week. Lifestyle and risk factor modificatons discussed. Various goals are discussed and questions answered. Continue current medications. Appropriate prescriptions are addressed. Questions answered and patient verbalizes understanding. Call for any problems, questions, or concerns. Pt is to follow up in 1 months for Cardiac management    Electronically signed by Nazario King.  DENVER Esparza CNP on 8/29/2022 at 12:11 PM

## 2022-09-21 ENCOUNTER — PROCEDURE VISIT (OUTPATIENT)
Dept: CARDIOLOGY CLINIC | Age: 67
End: 2022-09-21
Payer: MEDICARE

## 2022-09-21 DIAGNOSIS — E78.2 MIXED HYPERLIPIDEMIA: ICD-10-CM

## 2022-09-21 DIAGNOSIS — R60.0 LEG EDEMA: ICD-10-CM

## 2022-09-21 DIAGNOSIS — I35.1 NONRHEUMATIC AORTIC VALVE INSUFFICIENCY: ICD-10-CM

## 2022-09-21 DIAGNOSIS — I10 ESSENTIAL HYPERTENSION: ICD-10-CM

## 2022-09-21 DIAGNOSIS — R55 VASOVAGAL ATTACK: ICD-10-CM

## 2022-09-21 DIAGNOSIS — I25.10 CORONARY ARTERY DISEASE INVOLVING NATIVE CORONARY ARTERY OF NATIVE HEART WITHOUT ANGINA PECTORIS: ICD-10-CM

## 2022-09-21 PROCEDURE — 93970 EXTREMITY STUDY: CPT | Performed by: INTERNAL MEDICINE

## 2022-09-28 ENCOUNTER — OFFICE VISIT (OUTPATIENT)
Dept: CARDIOLOGY CLINIC | Age: 67
End: 2022-09-28
Payer: MEDICARE

## 2022-09-28 VITALS
WEIGHT: 194 LBS | DIASTOLIC BLOOD PRESSURE: 62 MMHG | HEART RATE: 76 BPM | SYSTOLIC BLOOD PRESSURE: 124 MMHG | BODY MASS INDEX: 30.45 KG/M2 | HEIGHT: 67 IN

## 2022-09-28 DIAGNOSIS — I87.2 VENOUS REFLUX: Primary | ICD-10-CM

## 2022-09-28 DIAGNOSIS — I10 ESSENTIAL HYPERTENSION: ICD-10-CM

## 2022-09-28 DIAGNOSIS — I35.1 NONRHEUMATIC AORTIC VALVE INSUFFICIENCY: ICD-10-CM

## 2022-09-28 DIAGNOSIS — R55 VASOVAGAL ATTACK: ICD-10-CM

## 2022-09-28 DIAGNOSIS — I25.10 CORONARY ARTERY DISEASE INVOLVING NATIVE CORONARY ARTERY OF NATIVE HEART WITHOUT ANGINA PECTORIS: ICD-10-CM

## 2022-09-28 PROCEDURE — 1123F ACP DISCUSS/DSCN MKR DOCD: CPT | Performed by: NURSE PRACTITIONER

## 2022-09-28 PROCEDURE — 1036F TOBACCO NON-USER: CPT | Performed by: NURSE PRACTITIONER

## 2022-09-28 PROCEDURE — G8417 CALC BMI ABV UP PARAM F/U: HCPCS | Performed by: NURSE PRACTITIONER

## 2022-09-28 PROCEDURE — G8427 DOCREV CUR MEDS BY ELIG CLIN: HCPCS | Performed by: NURSE PRACTITIONER

## 2022-09-28 PROCEDURE — 99214 OFFICE O/P EST MOD 30 MIN: CPT | Performed by: NURSE PRACTITIONER

## 2022-09-28 PROCEDURE — 1090F PRES/ABSN URINE INCON ASSESS: CPT | Performed by: NURSE PRACTITIONER

## 2022-09-28 PROCEDURE — G8400 PT W/DXA NO RESULTS DOC: HCPCS | Performed by: NURSE PRACTITIONER

## 2022-09-28 PROCEDURE — 3017F COLORECTAL CA SCREEN DOC REV: CPT | Performed by: NURSE PRACTITIONER

## 2022-09-28 NOTE — PROGRESS NOTES
[facility] [address] [facility]  Phone: [phone]    Fax [phone]                  Amira Eastman MD, Henri Enamorado MD, Stephen Sahu MD, MD Eunice Dash MD Wannetta Celestine, MD Davie Evener, APRARTIS Vieyra, DENVER Martinez, DENVER Santiago, DENVER        Cardiology Progress Note      9/28/2022    RE: Leah Walker  ([birth year])                             Primary cardiologist: Dr. Eunice Montano       Subjective:  CC:   1. Venous reflux    2. Nonrheumatic aortic valve insufficiency    3. Coronary artery disease involving native coronary artery of native heart without angina pectoris    4. Essential hypertension    5. Vasovagal attack        HPI: Leah Walker, who is a  75y.o. year old female with a past medical history as listed below. Patient presents to the office for follow up on CAD (PCI of LAD in 2012 and RCA in 2013), HTN, chest pain, vasovagal attacks, venous reflux and hyperlipidemia. Patient wore Holter monitor in the past which did not show any significant abnormalities but noted to have symptoms associated with sinus bradycardia. Patient has vasovagal attacks. Stress test, tilt table, and echo normal.  Patient was thought to have Raynaud's phenomenon after ER visit due to fingers turing white. Recently complained of lower extremity swelling and had venous study which showed significant reflux. Past Medical History:   Diagnosis Date    Anxiety     CAD (coronary artery disease)     Chest pain     Chronic back pain     Depression     Essential hypertension 04/02/2021    GERD (gastroesophageal reflux disease)     H/O cardiovascular stress test     7/26/12-No scintigraphic evidence of inducible myocardial ischemia. Global Left ventricular sytolic function normal. Rest EF 64%. No ECG changes. Unremarkable pharmacological stress test. Normal Myocardial Perfusion Study. 0 w/ PTCA and stent w. 2.75 X 23 Xience. Left main coronary artery has no signif. dis. LAD artery has mild disease. CX artery has no signif. disease. EF 55%    S/P transesophageal echocardiogram (MEHUL) 05/12/2022    Appears to be slight prolapse to the anterior mitral valve leaflet, MOD-AR & MILD MR. Current Outpatient Medications   Medication Sig Dispense Refill    diphenhydrAMINE (BENADRYL) 25 MG tablet Take 25 mg by mouth as needed for Itching      simvastatin (ZOCOR) 20 MG tablet Take 1 tablet by mouth nightly 90 tablet 3    lisinopril (PRINIVIL;ZESTRIL) 20 MG tablet Take 1 tablet by mouth in the morning and at bedtime 180 tablet 3    triamterene-hydroCHLOROthiazide (MAXZIDE-25) 37.5-25 MG per tablet Take 1 tablet by mouth daily 90 tablet 3    ondansetron (ZOFRAN ODT) 4 MG disintegrating tablet Take 1 tablet by mouth every 8 hours as needed for Nausea 15 tablet 0    famotidine (PEPCID) 10 MG tablet Take 10 mg by mouth daily       lidocaine-prilocaine (EMLA) 2.5-2.5 % cream Apply topically as needed. 1 Tube 3    nitroGLYCERIN (NITROSTAT) 0.4 MG SL tablet Place 1 tablet under the tongue every 5 minutes as needed for Chest pain 25 tablet 3    VENTOLIN  (90 Base) MCG/ACT inhaler INHALE 2 PUFFS INTO THE LUNGS DAILY AS NEEDED  3    pantoprazole (PROTONIX) 40 MG tablet TAKE 1 TABLET BY MOUTH EVERY DAY  2    dicyclomine (BENTYL) 20 MG tablet Take 20 mg by mouth every 6 hours       loratadine (CLARITIN) 10 MG tablet Take 10 mg by mouth daily. aspirin EC 81 MG EC tablet Take 1 tablet by mouth daily. (Patient taking differently: Take 81 mg by mouth daily Coated aspirin) 30 tablet 3    traZODone (DESYREL) 50 MG tablet Take 50 mg by mouth nightly (Patient not taking: No sig reported)       No current facility-administered medications for this visit. Review of Systems:  Review of Systems   Cardiovascular:  Positive for leg swelling. Negative for chest pain. Neurological:  Negative for dizziness. effusion. Echo:2/10/22  Technically difficult examination. Left ventricular systolic function is normal.   Ejection fraction is visually estimated at 60%. Indeterminate diastolic function; E/A flow reversal is noted. Severe aortic regurgitation is noted. No evidence of any pericardial effusion    Echo:5/17/22  No evidence of thrombus within the left atrial appendage. Negative bubble study; no ASD or PFO noted. Moderate aortic regurgitation. Appears to be slight prolapse to the anterior mitral valve leaflet. Mild mitral regurgitation. Stress Test:2/10/22  Negative for ischemia    The ASCVD Risk score (Trinh DK, et al., 2019) failed to calculate for the following reasons:    Cannot find a previous HDL lab    Cannot find a previous total cholesterol lab      Assessment/ Plan:     Essential hypertension   -Stable, continue with lisinopril 20 mg BID, and Maxide 37.5-25mg. Discussed possible raynaud's treatment with Norvasc if continues to reoccur. Mixed hyperlipidemia   -No labs completed. Will get labs from PCP. CAD (coronary artery disease)   -PCI of LAD in 2012 and RCA in 2013. Stress test and echo this year. Stress test was negative for ischemia. Primary and secondary prevention discussed with patient:   -Low sodium cardiac diet   -exercise 30 min a day three days a week    Continue current medications ASA, Lisinopril, maxide, Zocor      Aortic regurgitation  -MEHUL this year shows moderate degree when previous echo was severe aortic regurgitation. Shortness of breath  -Patient having frequent episodes of chest pain and shortness of breath along with diaphoresis. No endocrine or pulmonary disease noted. Patient has moderate aortic regurgitation. EKG shows NSR today. Recent stress test normal.  Moderate aortic stenosis noted. Vasovagal attack  -Normal tilt table test.  Patient has not lost consciousness. She reports having cold sweats, chest pain and shortness of breath. Recommend deep breathing exercises. Reports episodes happen weakly. Venous Reflux  -L>R bilateral edema. Ultrasound of lower extremities showed significant reflux of right proximal SSV and left CFV. Recommend compression stockings and continue with Maxzide 37.5 -25 mg. Patient seen, interviewed and examined. Testing was reviewed. Patient is encouraged to exercise even a brisk walk for 30 minutes at least 3 to 4 times a week. Lifestyle and risk factor modificatons discussed. Various goals are discussed and questions answered. Continue current medications. Appropriate prescriptions are addressed. Questions answered and patient verbalizes understanding. Call for any problems, questions, or concerns. Pt is to follow up in 6 months for Cardiac management    Electronically signed by Bev Knox.  DENVER Chau CNP on 9/28/2022 at 10:44 AM

## 2022-10-19 NOTE — ADDENDUM NOTE
----- Message from Shana Philip NP sent at 10/19/2022  9:03 AM CDT -----  Labs reviewed: Please call patient.  Liver function is elevated.  Lipids are as follows: Total cholesterol 115 (<150), LDL 38 (<70), HDL 54 (>50), Triglycerides 113 (<150).  LDL, or bad cholesterol, is excellent.  Triglycerides are excellent.  HDL is excellent.    Dramatic improvement in lipids! Keep up the good work!  With LDL on the low side and LFTs remaining elevated, would like you to stop Zetia. Recheck lipids and LFTs in 6-8 weeks (please order if he agrees).       Addended by: Mercedes Parrish on: 10/25/2021 04:36 PM     Modules accepted: Orders

## 2023-02-01 ENCOUNTER — TELEPHONE (OUTPATIENT)
Dept: CARDIOLOGY CLINIC | Age: 68
End: 2023-02-01

## 2023-02-01 NOTE — TELEPHONE ENCOUNTER
The pt called- for DOS 10/25/2021 - 11/29/2021, and 2/10/2022-  she had [employer] as 2nd to [employer]. If these DOS can be retracted from collections and billed to [facility]. I emailed [email or web address]  Requesting the charges be billed to [employer].

## 2023-02-27 ENCOUNTER — TELEPHONE (OUTPATIENT)
Dept: CARDIOLOGY CLINIC | Age: 68
End: 2023-02-27

## 2023-02-27 NOTE — TELEPHONE ENCOUNTER
I talked to the pt, we went over the charges on the bill she is getting- DOS 4/21/2022 OV $191.00- Mehnaz has she has a  Co-Ins of $25.00. I said if she calls Mehnaz and asks why she's getting billed for that. The other DOS is 5/2/2022 $370.00, after ins paid, he resp is $10.90- I don't see this charge was from us. She will call the ph# on the stmt and ask who's the provider is.

## 2023-03-27 ENCOUNTER — OFFICE VISIT (OUTPATIENT)
Dept: SURGERY | Age: 68
End: 2023-03-27
Payer: COMMERCIAL

## 2023-03-27 VITALS
BODY MASS INDEX: 30.31 KG/M2 | HEART RATE: 77 BPM | SYSTOLIC BLOOD PRESSURE: 126 MMHG | DIASTOLIC BLOOD PRESSURE: 72 MMHG | WEIGHT: 193.1 LBS | HEIGHT: 67 IN | OXYGEN SATURATION: 99 %

## 2023-03-27 DIAGNOSIS — R10.33 UMBILICAL PAIN: Primary | ICD-10-CM

## 2023-03-27 DIAGNOSIS — L91.0 KELOID OF SKIN: ICD-10-CM

## 2023-03-27 PROCEDURE — 3078F DIAST BP <80 MM HG: CPT | Performed by: SURGERY

## 2023-03-27 PROCEDURE — G8427 DOCREV CUR MEDS BY ELIG CLIN: HCPCS | Performed by: SURGERY

## 2023-03-27 PROCEDURE — 1036F TOBACCO NON-USER: CPT | Performed by: SURGERY

## 2023-03-27 PROCEDURE — 1123F ACP DISCUSS/DSCN MKR DOCD: CPT | Performed by: SURGERY

## 2023-03-27 PROCEDURE — 99214 OFFICE O/P EST MOD 30 MIN: CPT | Performed by: SURGERY

## 2023-03-27 PROCEDURE — G8400 PT W/DXA NO RESULTS DOC: HCPCS | Performed by: SURGERY

## 2023-03-27 PROCEDURE — G8484 FLU IMMUNIZE NO ADMIN: HCPCS | Performed by: SURGERY

## 2023-03-27 PROCEDURE — 3017F COLORECTAL CA SCREEN DOC REV: CPT | Performed by: SURGERY

## 2023-03-27 PROCEDURE — G8417 CALC BMI ABV UP PARAM F/U: HCPCS | Performed by: SURGERY

## 2023-03-27 PROCEDURE — 1090F PRES/ABSN URINE INCON ASSESS: CPT | Performed by: SURGERY

## 2023-03-27 PROCEDURE — 3074F SYST BP LT 130 MM HG: CPT | Performed by: SURGERY

## 2023-03-27 ASSESSMENT — ENCOUNTER SYMPTOMS
SORE THROAT: 0
ANAL BLEEDING: 0
EYE ITCHING: 0
RECTAL PAIN: 0
PHOTOPHOBIA: 0
CHOKING: 0
STRIDOR: 0
COLOR CHANGE: 0
CONSTIPATION: 0
BACK PAIN: 0
APNEA: 0
EYE REDNESS: 0

## 2023-03-27 ASSESSMENT — PATIENT HEALTH QUESTIONNAIRE - PHQ9
1. LITTLE INTEREST OR PLEASURE IN DOING THINGS: 0
SUM OF ALL RESPONSES TO PHQ QUESTIONS 1-9: 0
2. FEELING DOWN, DEPRESSED OR HOPELESS: 0
SUM OF ALL RESPONSES TO PHQ9 QUESTIONS 1 & 2: 0
SUM OF ALL RESPONSES TO PHQ QUESTIONS 1-9: 0

## 2023-03-27 NOTE — PROGRESS NOTES
is no abdominal tenderness. There is no guarding or rebound. Musculoskeletal:         General: Normal range of motion. Cervical back: Normal range of motion and neck supple. Skin:     General: Skin is warm. Neurological:      Mental Status: She is alert and oriented to person, place, and time. ASSESSMENT:  1. Umbilical pain    2. Keloid of skin          PLAN:  Treatment:   Will proceed with excision and topical radiation to prevent keloid re formation. Patient counseled on risks, benefits, and alternatives of treatment plan at length today. Patient states an understanding and willingness to proceed with plan. No orders of the defined types were placed in this encounter. No orders of the defined types were placed in this encounter. Follow Up:  No follow-ups on file.         Sandie Cage MD

## 2023-03-30 ENCOUNTER — OFFICE VISIT (OUTPATIENT)
Dept: CARDIOLOGY CLINIC | Age: 68
End: 2023-03-30
Payer: MEDICARE

## 2023-03-30 VITALS
SYSTOLIC BLOOD PRESSURE: 118 MMHG | DIASTOLIC BLOOD PRESSURE: 64 MMHG | HEIGHT: 67 IN | HEART RATE: 54 BPM | WEIGHT: 193.2 LBS | BODY MASS INDEX: 30.32 KG/M2

## 2023-03-30 DIAGNOSIS — I25.10 CORONARY ARTERY DISEASE INVOLVING NATIVE CORONARY ARTERY OF NATIVE HEART WITHOUT ANGINA PECTORIS: Primary | ICD-10-CM

## 2023-03-30 PROCEDURE — 3017F COLORECTAL CA SCREEN DOC REV: CPT | Performed by: INTERNAL MEDICINE

## 2023-03-30 PROCEDURE — 1036F TOBACCO NON-USER: CPT | Performed by: INTERNAL MEDICINE

## 2023-03-30 PROCEDURE — 1090F PRES/ABSN URINE INCON ASSESS: CPT | Performed by: INTERNAL MEDICINE

## 2023-03-30 PROCEDURE — 3078F DIAST BP <80 MM HG: CPT | Performed by: INTERNAL MEDICINE

## 2023-03-30 PROCEDURE — 3074F SYST BP LT 130 MM HG: CPT | Performed by: INTERNAL MEDICINE

## 2023-03-30 PROCEDURE — 1123F ACP DISCUSS/DSCN MKR DOCD: CPT | Performed by: INTERNAL MEDICINE

## 2023-03-30 PROCEDURE — G8427 DOCREV CUR MEDS BY ELIG CLIN: HCPCS | Performed by: INTERNAL MEDICINE

## 2023-03-30 PROCEDURE — 99214 OFFICE O/P EST MOD 30 MIN: CPT | Performed by: INTERNAL MEDICINE

## 2023-03-30 PROCEDURE — G8417 CALC BMI ABV UP PARAM F/U: HCPCS | Performed by: INTERNAL MEDICINE

## 2023-03-30 PROCEDURE — G8484 FLU IMMUNIZE NO ADMIN: HCPCS | Performed by: INTERNAL MEDICINE

## 2023-03-30 PROCEDURE — G8400 PT W/DXA NO RESULTS DOC: HCPCS | Performed by: INTERNAL MEDICINE

## 2023-03-30 NOTE — PROGRESS NOTES
1 Tube 3    nitroGLYCERIN (NITROSTAT) 0.4 MG SL tablet Place 1 tablet under the tongue every 5 minutes as needed for Chest pain 25 tablet 3    VENTOLIN  (90 Base) MCG/ACT inhaler INHALE 2 PUFFS INTO THE LUNGS DAILY AS NEEDED  3    pantoprazole (PROTONIX) 40 MG tablet TAKE 1 TABLET BY MOUTH EVERY DAY  2    dicyclomine (BENTYL) 20 MG tablet Take 20 mg by mouth every 6 hours       loratadine (CLARITIN) 10 MG tablet Take 10 mg by mouth daily. aspirin EC 81 MG EC tablet Take 1 tablet by mouth daily. (Patient taking differently: Take 81 mg by mouth daily Coated aspirin) 30 tablet 3    traZODone (DESYREL) 50 MG tablet Take 50 mg by mouth nightly (Patient not taking: No sig reported)      ondansetron (ZOFRAN ODT) 4 MG disintegrating tablet Take 1 tablet by mouth every 8 hours as needed for Nausea (Patient not taking: No sig reported) 15 tablet 0     No current facility-administered medications for this visit. Allergies: Pregabalin, Hydromorphone hcl, Aspirin, Codeine, Diatrizoate, Gabapentin, Ibuprofen, and Iodine  Past Medical History:   Diagnosis Date    Anxiety     CAD (coronary artery disease)     Chest pain     Chronic back pain     Depression     Essential hypertension 04/02/2021    GERD (gastroesophageal reflux disease)     H/O cardiovascular stress test     7/26/12-No scintigraphic evidence of inducible myocardial ischemia. Global Left ventricular sytolic function normal. Rest EF 64%. No ECG changes. Unremarkable pharmacological stress test. Normal Myocardial Perfusion Study. 3/30/11- EF70% Normal Myocardial Perfusion study. 2/18/2010 EF =>55%;1/2009 EF 65%, 1/2008, 1/2007, 5/2006, 2/2006 EF70%    H/O Doppler ultrasound 03/03/2008     Carotid Report- No Significant atherosclerotic plaque noted in the right internal carotid artery. The Doppler flow velocities w/in FREDERIC are within normal limits. There is intimal thinckening but no significant atherosclerotic plaque noted in LICA.  The Dopple flow

## 2023-03-30 NOTE — PATIENT INSTRUCTIONS
**It is YOUR responsibilty to bring medication bottles and/or updated medication list to [address] [place] [address]. This will allow us to better serve you and all your healthcare needs**    Please be informed that if you contact our office outside of normal business hours the physician on call cannot help with any scheduling or rescheduling issues, procedure instruction questions or any type of medication issue. We advise you for any urgent/emergency that you go to the nearest emergency room! PLEASE CALL OUR OFFICE DURING NORMAL BUSINESS HOURS    Monday - Friday   8 am to 5 pm    [place]: [phone]    [place]:  [phone]    Thank you for allowing us to care for you today! We want to ensure we can follow your treatment plan and we strive to give you the best outcomes and experience possible. If you ever have a life threatening emergency and call 911 - for an ambulance (EMS)   Our providers can only care for you at:   [facility] or [facility]. Even if you have someone take you or you drive yourself we can only care for you in a [facility] facility. Our providers are not setup at the other healthcare locations!

## 2023-04-19 ENCOUNTER — TELEPHONE (OUTPATIENT)
Dept: CARDIOLOGY CLINIC | Age: 68
End: 2023-04-19

## 2023-04-19 NOTE — TELEPHONE ENCOUNTER
Patient called she going to have allergy testing 5/4  Ear Nose & Throat , they are asking her to hold her Metoprolol  72 hrs prior and she wants to make sure that's ok

## 2023-04-20 ENCOUNTER — TELEPHONE (OUTPATIENT)
Dept: SURGERY | Age: 68
End: 2023-04-20

## 2023-04-20 DIAGNOSIS — L91.0 KELOID OF SKIN: Primary | ICD-10-CM

## 2023-04-20 DIAGNOSIS — R10.33 UMBILICAL PAIN: ICD-10-CM

## 2023-04-20 NOTE — TELEPHONE ENCOUNTER
Pt seen by  03/27/23  Ref needed sent to rad. onc. prior to surgery. Pt was called by this nurse to inform her of ref and be informed of details. PT was unavailable left message to return our call. Called Dr. Beatrice Isaacs at [facility] for 1st available appointments times.  Left message on [employer]

## 2023-04-24 ENCOUNTER — HOSPITAL ENCOUNTER (OUTPATIENT)
Dept: RADIATION ONCOLOGY | Age: 68
Discharge: HOME | End: 2023-04-24
Payer: MEDICARE

## 2023-04-24 VITALS
WEIGHT: 198.2 LBS | HEART RATE: 68 BPM | BODY MASS INDEX: 31.11 KG/M2 | TEMPERATURE: 97.6 F | SYSTOLIC BLOOD PRESSURE: 155 MMHG | HEIGHT: 67 IN | OXYGEN SATURATION: 99 % | DIASTOLIC BLOOD PRESSURE: 70 MMHG

## 2023-04-24 PROCEDURE — 99205 OFFICE O/P NEW HI 60 MIN: CPT | Performed by: RADIOLOGY

## 2023-04-24 PROCEDURE — 99211 OFF/OP EST MAY X REQ PHY/QHP: CPT

## 2023-04-24 RX ORDER — METOPROLOL SUCCINATE 25 MG/1
25 TABLET, EXTENDED RELEASE ORAL DAILY
COMMUNITY
Start: 2023-02-07

## 2023-04-24 RX ORDER — FAMOTIDINE 40 MG/1
40 TABLET, FILM COATED ORAL DAILY
COMMUNITY
Start: 2023-02-07

## 2023-04-24 ASSESSMENT — PAIN DESCRIPTION - LOCATION: LOCATION: UMBILICUS

## 2023-04-24 ASSESSMENT — PAIN SCALES - GENERAL: PAINLEVEL_OUTOF10: 3

## 2023-04-24 ASSESSMENT — PAIN DESCRIPTION - FREQUENCY: FREQUENCY: CONTINUOUS

## 2023-04-24 NOTE — CONSULTS
Radiation Oncology Consultation  Encounter Date: 2023 1:05 PM    Ms. Lisset Peters is a 75 y.o. female  : 1955  MRN: [id number]  [facility]  Requesting Provider: No att. providers found        CONSULTANT: Lowell Friedman MD    PHYSICIANS:   Primary Care: Christine Capps MD   Surgeon: Dr. Sonia Singh    DIAGNOSIS: keloid near umbilicus     HPI:     Ms. Karma Heredia is a 75year old female who presents for consultation of radiotherapy treatment options of the above diagnosis. She has not had radiation therapy before. She does not have a cardiac plan device. She is soft tissue mass/keloid on the umbilicus. She had a hernia repair in that area several years ago also was complicated by wound infection. She had a suture granuloma treated surgically. It's itchy and sensitive to touch. She saw Dr. Sonia Singh 3/27/23 and the plan is for surgical resection followed by radiation therapy to decrease the risk of it coming back. Past Medical History:   Diagnosis Date    Anxiety     CAD (coronary artery disease)     Chest pain     Chronic back pain     Depression     Essential hypertension 2021    GERD (gastroesophageal reflux disease)     H/O cardiovascular stress test     12-No scintigraphic evidence of inducible myocardial ischemia. Global Left ventricular sytolic function normal. Rest EF 64%. No ECG changes. Unremarkable pharmacological stress test. Normal Myocardial Perfusion Study. 3/30/11- EF70% Normal Myocardial Perfusion study. 2010 EF =>55%;2009 EF 65%, 2008, 2007, 2006, 2006 EF70%    H/O Doppler ultrasound 2008     Carotid Report- No Significant atherosclerotic plaque noted in the right internal carotid artery. The Doppler flow velocities w/in FREDERIC are within normal limits. There is intimal thinckening but no significant atherosclerotic plaque noted in LICA. The Dopple flow velocities w/in LICA are Within Normal Limits.     H/O echocardiogram

## 2023-04-24 NOTE — PLAN OF CARE
Radiation education and handouts given. Side effects and management reviewed with pt. All questions answered and pt voices understanding . Nursing Care Plan for Skin Radiation    Pain related to cancer diagnosis and treatment. Interventions   Pain assessment. Monitor pharmacological pain medication. Teach relaxation and repositioning techniques. Expected Outcome   Maintain patient's acceptable pain level or <5 on pain scale. Knowledge deficit related to diagnosis and treatment plan. Interventions   Assess patient's ability to comprehend diagnosis and treatment plan. Provide educational materials and teaching regarding plan of care. Provide emotional support and continued education. Refer to psychosocial coordinator if further assistance needed. Expected Outcome   Patient voices understanding of diagnosis and treatment plan. Altered skin integrity related to treatment. Interventions   Evaluation of skin integrity at therapy site. Advise patient on appropriate skin care. Instruct patient on recommended lotions/ointments/creams/dressing changes to use on therapy site. Expected Outcome   Minimize adverse skin reaction/infection at treatment site. Altered musculo/skeletal/functional status. Interventions   Assess range of motion and ability to perform ADL's. Provide assistance when needed. Referral for OT/PT evaluation and treat. Expected Outcome   Patient to obtain/maintain highest potential functional level. Nurse to evaluate weekly during radiation treatments.

## 2023-04-24 NOTE — PROGRESS NOTES
Marion Ribeiroton  4/24/2023    CONSULT     Vitals:    04/24/23 1310   BP: (!) 155/70   Pulse: 68   Temp: 97.6 °F (36.4 °C)   SpO2: 99%        Oxygen Therapy  SpO2: 99 %  Pulse Oximeter Device Location: Finger  O2 Device: None (Room air)  Skin Assessment: Clean, dry, & intact  Oxygen Therapy: None (Room air)    Wt Readings from Last 3 Encounters:   04/24/23 198 lb 3.2 oz (89.9 kg)   03/30/23 193 lb 3.2 oz (87.6 kg)   03/27/23 193 lb 1.6 oz (87.6 kg)        Pain Assessment  Pain Assessment: 0-10  Pain Level: 3  Pain Location: Umbilicus  Pain Frequency: Continuous  Denies Need for Intervention    Fall Risk:    Not currently a fall risk  Re-Evaluate Weekly    Nutritional Alteration:  None  No Difficulties Swallowing  Voices Sufficient Oral Intake    Mediport: no    Pacemaker/ICD: no    Implants: no    Previous XRT: no    Assessment: PATIENT PRESENTS HER WITH MOTHER DANIEL WILL MAKE DECISIONS IF AT ANY POINT UNABLE TO DO SO. Past Medical History:   Diagnosis Date    Anxiety     CAD (coronary artery disease)     Chest pain     Chronic back pain     Depression     Essential hypertension 04/02/2021    GERD (gastroesophageal reflux disease)     H/O cardiovascular stress test     7/26/12-No scintigraphic evidence of inducible myocardial ischemia. Global Left ventricular sytolic function normal. Rest EF 64%. No ECG changes. Unremarkable pharmacological stress test. Normal Myocardial Perfusion Study. 3/30/11- EF70% Normal Myocardial Perfusion study. 2/18/2010 EF =>55%;1/2009 EF 65%, 1/2008, 1/2007, 5/2006, 2/2006 EF70%    H/O Doppler ultrasound 03/03/2008     Carotid Report- No Significant atherosclerotic plaque noted in the right internal carotid artery. The Doppler flow velocities w/in FREDERIC are within normal limits. There is intimal thinckening but no significant atherosclerotic plaque noted in LICA. The Dopple flow velocities w/in LICA are Within Normal Limits. H/O echocardiogram     7/26/12- EF=>55%.  Left

## 2023-04-25 ENCOUNTER — TELEPHONE (OUTPATIENT)
Dept: SURGERY | Age: 68
End: 2023-04-25

## 2023-04-25 DIAGNOSIS — Z01.818 PRE-OPERATIVE CLEARANCE: Primary | ICD-10-CM

## 2023-04-25 NOTE — TELEPHONE ENCOUNTER
Notification or Prior Authorization is not required for the requested services    Decision ID #:[id number]    The number above acknowledges your inquiry and our response. Please write this number down and refer to it for future inquiries. Coverage and payment for an item or service is governed by the member's benefit plan document, and, if applicable, the provider's participation agreement with the Health Plan. Notification is not a verification, guarantee of benefits, or clinical determination. Payment of services is based on your participation agreement with us and the enrollee's benefit plan at the time services are provided. A Notification may be considered late if not submitted within one business day after the date of admission or submitted per your participation agreement. Please reference your agreement for further information in this regard. Expand all   Collapse all  PATIENT DETAILS  PATIENT NAME RELATIONSHIP VERBAL LANGUAGE PREFERENCE MESSAGE  [employer] Employee - A future timeline may be available for this member. For future coverage please call the telephone number located on the back of the [facility] Medical ID card.   MEMBER NUMBER EFFECTIVE DATE WRITTEN LANGUAGE PREFERENCE  [id number] 03/01/2023 -  GROUP NUMBER TERMINATION DATE  OHDSNP 12/31/9999  PRODUCT INSURANCE TYPE  - Both Medicare And Medicaid  ADMITTING/ATTENDING PHYSICIAN DETAILS   NAME ADDRESS  [facility] [address]  TAX ID STATUS  [id number]  In-Network  FACILITY DETAILS  NAME* [address], [facility], [address] [place] [address]  FACILITY ID NUMBER* STATUS  [id number]  In-Network  SERVICE DETAILS  PLACE OF SERVICE  SERVICE DETAILS   Outpatient Facility Surgical   FACILITY SERVICE DATES DETAILS  START DATE*: END DATE: SERVICE DESCRIPTION*   05/09/2023 08/07/2023  Scheduled  DIAGNOSIS DETAILS  Please list the primary diagnosis code along with

## 2023-04-26 ENCOUNTER — TELEPHONE (OUTPATIENT)
Dept: RADIATION ONCOLOGY | Age: 68
End: 2023-04-26

## 2023-04-26 PROCEDURE — 77263 THER RADIOLOGY TX PLNG CPLX: CPT | Performed by: RADIOLOGY

## 2023-04-26 NOTE — TELEPHONE ENCOUNTER
Pt calling to report Keloid surgery scheduled for May 9, 2023 at 7:30 AM at [facility]. Pt to come directly after release from [facility] for radiation set up, planning and first radiation treatment. Pt advised to call this RN's direct number with any questions or concerns, pt voices understanding. Dr. Deedee Seay and RTT staff notified of above.

## 2023-04-27 ENCOUNTER — TELEPHONE (OUTPATIENT)
Dept: SURGERY | Age: 68
End: 2023-04-27

## 2023-04-27 NOTE — TELEPHONE ENCOUNTER
SPOKE TO  Aracely Pérez REGARDING SURGERY (UMB KELOID EXC) SCHEDULED @ [facility].  NOTIFIED OF DATES, TIMES AND LOCATION    PHONE ASSESSMENT   SURGERY - 5/9/23 @ 730  P/O - 5/22/23 @ 1000    NPO AFTER MIDNIGHT    HOLD BLOOD THINNERS - 81MG ASA DO NOT HOLD

## 2023-04-28 ENCOUNTER — TELEPHONE (OUTPATIENT)
Dept: CARDIOLOGY CLINIC | Age: 68
End: 2023-04-28

## 2023-05-04 RX ORDER — M-VIT,TX,IRON,MINS/CALC/FOLIC 27MG-0.4MG
1 TABLET ORAL DAILY
COMMUNITY

## 2023-05-04 RX ORDER — MULTIVIT WITH MINERALS/LUTEIN
1000 TABLET ORAL DAILY
COMMUNITY

## 2023-05-04 RX ORDER — ACETAMINOPHEN 325 MG/1
650 TABLET ORAL EVERY 6 HOURS PRN
COMMUNITY

## 2023-05-04 RX ORDER — CHOLECALCIFEROL (VITAMIN D3) 1250 MCG
CAPSULE ORAL
COMMUNITY

## 2023-05-08 ENCOUNTER — ANESTHESIA EVENT (OUTPATIENT)
Dept: OPERATING ROOM | Age: 68
End: 2023-05-08
Payer: MEDICARE

## 2023-05-08 NOTE — PROGRESS NOTES
Patient notified of surgery time at [facility] 5/9/23 0730 with arrival at 0600, understanding verbalized

## 2023-05-08 NOTE — H&P
General Surgery - H&P  Dr. Kristine Blunt PA-C      SUBJECTIVE:  HPI: Patient presents for evaluation of a soft tissue mass. Mass is located on the at the umbilicus. Patient has had previous umbilical hernia repair several years ago with complication of wound infection. Patient recalls having suture granuloma which was treated surgically. Unsure if there is any remaining suture. There is some itching noted at the scar site which could be secondary to keloid formation. The area is sensitive to even clothing touch. Patient denies any fever chills. There is no bulge in this area since. I have reviewed the patient's(pertinent information to this visit) medical history, familyhistory(scanned in  the Media tab under \"patient questioner\"), social history and review of systems with the patient today in the office. Past Surgical History         Past Surgical History:   Procedure Laterality Date    CARDIAC CATHETERIZATION         4/24/2006-Left heart cath-    CARDIAC CATHETERIZATION   03/11/2014     EF 55% Left anterior descending artery has patent stent, proximal LAD has eccentric lesion of 30-40% unchanged from last time, The right coronary artery is a dominant vessel with abberant take off, has 40-50% mid RCA stenosis, proximal stent is patent.  No evidence of hemodynamically significant coronary artery disease, I have tried to do FFR of RCA, however, because of  aberrant take off, could no    CORONARY ANGIOPLASTY WITH STENT PLACEMENT   8/23/13 8/23/13 LAD 7/2012; 4/25/2006- PTCA w/ stent-> RCA    EYE SURGERY Bilateral       cataracts removed    HERNIA REPAIR   85/44/5576     umbilical    HYSTERECTOMY (CERVIX STATUS UNKNOWN)        HYSTERECTOMY, TOTAL ABDOMINAL (CERVIX REMOVED)        TONSILLECTOMY AND ADENOIDECTOMY        ULNAR TUNNEL RELEASE   11/2006     Ulnar nerve tranfer Left elbow    WRIST SURGERY   7/2008     Left wrist         Past Medical History        Past Medical History:

## 2023-05-08 NOTE — ANESTHESIA PRE PROCEDURE
Department of Anesthesiology  Preprocedure Note       Name:  Philip Argueta   Age:  75 y.o.  :  1955                                          MRN:  [id number]         Date:  2023      Surgeon: Teri Dalal):  Deng Springer MD    Procedure: Procedure(s): UMBILICAL KELOID  EXCISION    Medications prior to admission:   Prior to Admission medications    Medication Sig Start Date End Date Taking? Authorizing Provider   Multiple Vitamins-Minerals (THERAPEUTIC MULTIVITAMIN-MINERALS) tablet Take 1 tablet by mouth daily Women over 50   Yes Historical Provider, MD   vitamin E 1000 units capsule Take 1 capsule by mouth daily   Yes Historical Provider, MD   Cholecalciferol (VITAMIN D3) 1.25 MG (72762 UT) CAPS Take by mouth   Yes Historical Provider, MD   Multiple Vitamins-Minerals (THERAPEUTIC MULTIVITAMIN-MINERALS) tablet Take 1 tablet by mouth daily Hair, Skin and Nails   Yes Historical Provider, MD   acetaminophen (TYLENOL) 325 MG tablet Take 2 tablets by mouth every 6 hours as needed for Pain   Yes Historical Provider, MD   famotidine (PEPCID) 40 MG tablet Take 1 tablet by mouth daily 23   Ritchie Alegre MD   metoprolol succinate (TOPROL XL) 25 MG extended release tablet Take 1 tablet by mouth daily 23   Coleman Perez MD   diphenhydrAMINE (BENADRYL) 25 MG tablet Take 25 mg by mouth as needed for Itching    Historical Provider, MD   simvastatin (ZOCOR) 20 MG tablet Take 1 tablet by mouth nightly 22   DENVER Ledesma - CNP   lisinopril (PRINIVIL;ZESTRIL) 20 MG tablet Take 1 tablet by mouth in the morning and at bedtime 22   DENVER Ledesma - CNP   triamterene-hydroCHLOROthiazide (MAXZIDE-25) 37.5-25 MG per tablet Take 1 tablet by mouth daily 22   DENVER Ledesma - CNP   traZODone (DESYREL) 50 MG tablet Take 50 mg by mouth nightly  Patient not taking: No sig reported    Historical Provider, MD   ondansetron (ZOFRAN ODT) 4 MG disintegrating tablet Take 1

## 2023-05-09 ENCOUNTER — HOSPITAL ENCOUNTER (OUTPATIENT)
Dept: RADIATION ONCOLOGY | Age: 68
Discharge: HOME | End: 2023-05-09
Payer: MEDICARE

## 2023-05-09 ENCOUNTER — HOSPITAL ENCOUNTER (OUTPATIENT)
Age: 68
Setting detail: OUTPATIENT SURGERY
Discharge: HOME | End: 2023-05-09
Attending: SURGERY | Admitting: SURGERY
Payer: MEDICARE

## 2023-05-09 ENCOUNTER — ANESTHESIA (OUTPATIENT)
Dept: OPERATING ROOM | Age: 68
End: 2023-05-09
Payer: MEDICARE

## 2023-05-09 VITALS
TEMPERATURE: 97.7 F | BODY MASS INDEX: 29.82 KG/M2 | SYSTOLIC BLOOD PRESSURE: 133 MMHG | HEART RATE: 59 BPM | OXYGEN SATURATION: 98 % | HEIGHT: 67 IN | WEIGHT: 190 LBS | RESPIRATION RATE: 16 BRPM | DIASTOLIC BLOOD PRESSURE: 59 MMHG

## 2023-05-09 DIAGNOSIS — R10.33 UMBILICAL PAIN: Primary | ICD-10-CM

## 2023-05-09 PROCEDURE — 77300 RADIATION THERAPY DOSE PLAN: CPT | Performed by: RADIOLOGY

## 2023-05-09 PROCEDURE — 77332 RADIATION TREATMENT AID(S): CPT | Performed by: RADIOLOGY

## 2023-05-09 PROCEDURE — 3700000001 HC ADD 15 MINUTES (ANESTHESIA): Performed by: SURGERY

## 2023-05-09 PROCEDURE — 2500000003 HC RX 250 WO HCPCS: Performed by: SURGERY

## 2023-05-09 PROCEDURE — 3600000012 HC SURGERY LEVEL 2 ADDTL 15MIN: Performed by: SURGERY

## 2023-05-09 PROCEDURE — 77290 THER RAD SIMULAJ FIELD CPLX: CPT | Performed by: RADIOLOGY

## 2023-05-09 PROCEDURE — 3600000002 HC SURGERY LEVEL 2 BASE: Performed by: SURGERY

## 2023-05-09 PROCEDURE — 11404 EXC TR-EXT B9+MARG 3.1-4 CM: CPT | Performed by: SURGERY

## 2023-05-09 PROCEDURE — 3700000000 HC ANESTHESIA ATTENDED CARE: Performed by: SURGERY

## 2023-05-09 PROCEDURE — 6360000002 HC RX W HCPCS: Performed by: SURGERY

## 2023-05-09 PROCEDURE — 11404 EXC TR-EXT B9+MARG 3.1-4 CM: CPT | Performed by: PHYSICIAN ASSISTANT

## 2023-05-09 PROCEDURE — 77412 RADIATION TX DELIVERY LVL 3: CPT | Performed by: RADIOLOGY

## 2023-05-09 PROCEDURE — 2709999900 HC NON-CHARGEABLE SUPPLY: Performed by: SURGERY

## 2023-05-09 PROCEDURE — 6360000002 HC RX W HCPCS: Performed by: PHYSICIAN ASSISTANT

## 2023-05-09 PROCEDURE — 2580000003 HC RX 258: Performed by: ANESTHESIOLOGY

## 2023-05-09 PROCEDURE — 88305 TISSUE EXAM BY PATHOLOGIST: CPT | Performed by: PATHOLOGY

## 2023-05-09 PROCEDURE — 7100000011 HC PHASE II RECOVERY - ADDTL 15 MIN: Performed by: SURGERY

## 2023-05-09 PROCEDURE — APPNB45 APP NON BILLABLE 31-45 MINUTES: Performed by: PHYSICIAN ASSISTANT

## 2023-05-09 PROCEDURE — 2580000003 HC RX 258: Performed by: PHYSICIAN ASSISTANT

## 2023-05-09 PROCEDURE — 12032 INTMD RPR S/A/T/EXT 2.6-7.5: CPT | Performed by: SURGERY

## 2023-05-09 PROCEDURE — 6360000002 HC RX W HCPCS: Performed by: NURSE ANESTHETIST, CERTIFIED REGISTERED

## 2023-05-09 PROCEDURE — 7100000010 HC PHASE II RECOVERY - FIRST 15 MIN: Performed by: SURGERY

## 2023-05-09 PROCEDURE — 12032 INTMD RPR S/A/T/EXT 2.6-7.5: CPT | Performed by: PHYSICIAN ASSISTANT

## 2023-05-09 RX ORDER — SODIUM CHLORIDE 0.9 % (FLUSH) 0.9 %
5-40 SYRINGE (ML) INJECTION EVERY 12 HOURS SCHEDULED
Status: DISCONTINUED | OUTPATIENT
Start: 2023-05-09 | End: 2023-05-09 | Stop reason: HOSPADM

## 2023-05-09 RX ORDER — TRIAMCINOLONE ACETONIDE 40 MG/ML
INJECTION, SUSPENSION INTRA-ARTICULAR; INTRAMUSCULAR
Status: COMPLETED | OUTPATIENT
Start: 2023-05-09 | End: 2023-05-09

## 2023-05-09 RX ORDER — LIDOCAINE HYDROCHLORIDE 10 MG/ML
INJECTION, SOLUTION EPIDURAL; INFILTRATION; INTRACAUDAL; PERINEURAL
Status: COMPLETED | OUTPATIENT
Start: 2023-05-09 | End: 2023-05-09

## 2023-05-09 RX ORDER — HYDROCODONE BITARTRATE AND ACETAMINOPHEN 5; 325 MG/1; MG/1
1 TABLET ORAL EVERY 6 HOURS PRN
Qty: 5 TABLET | Refills: 0 | Status: SHIPPED | OUTPATIENT
Start: 2023-05-09 | End: 2023-05-11 | Stop reason: SINTOL

## 2023-05-09 RX ORDER — SODIUM CHLORIDE 0.9 % (FLUSH) 0.9 %
5-40 SYRINGE (ML) INJECTION PRN
Status: DISCONTINUED | OUTPATIENT
Start: 2023-05-09 | End: 2023-05-09 | Stop reason: HOSPADM

## 2023-05-09 RX ORDER — PROPOFOL 10 MG/ML
INJECTION, EMULSION INTRAVENOUS PRN
Status: DISCONTINUED | OUTPATIENT
Start: 2023-05-09 | End: 2023-05-09

## 2023-05-09 RX ORDER — SODIUM CHLORIDE 9 MG/ML
INJECTION, SOLUTION INTRAVENOUS PRN
Status: DISCONTINUED | OUTPATIENT
Start: 2023-05-09 | End: 2023-05-09 | Stop reason: HOSPADM

## 2023-05-09 RX ORDER — PROPOFOL 10 MG/ML
INJECTION, EMULSION INTRAVENOUS CONTINUOUS PRN
Status: DISCONTINUED | OUTPATIENT
Start: 2023-05-09 | End: 2023-05-09 | Stop reason: SDUPTHER

## 2023-05-09 RX ORDER — TRIAMCINOLONE ACETONIDE 40 MG/ML
40 INJECTION, SUSPENSION INTRA-ARTICULAR; INTRAMUSCULAR ONCE
Status: DISCONTINUED | OUTPATIENT
Start: 2023-05-09 | End: 2023-05-09 | Stop reason: HOSPADM

## 2023-05-09 RX ORDER — SODIUM CHLORIDE, SODIUM LACTATE, POTASSIUM CHLORIDE, CALCIUM CHLORIDE 600; 310; 30; 20 MG/100ML; MG/100ML; MG/100ML; MG/100ML
INJECTION, SOLUTION INTRAVENOUS CONTINUOUS
Status: DISCONTINUED | OUTPATIENT
Start: 2023-05-09 | End: 2023-05-09 | Stop reason: HOSPADM

## 2023-05-09 RX ADMIN — PROPOFOL 100 MCG/KG/MIN: 10 INJECTION, EMULSION INTRAVENOUS at 07:40

## 2023-05-09 RX ADMIN — CEFAZOLIN 2000 MG: 2 INJECTION, POWDER, FOR SOLUTION INTRAMUSCULAR; INTRAVENOUS at 07:33

## 2023-05-09 RX ADMIN — SODIUM CHLORIDE, POTASSIUM CHLORIDE, SODIUM LACTATE AND CALCIUM CHLORIDE: 600; 310; 30; 20 INJECTION, SOLUTION INTRAVENOUS at 07:33

## 2023-05-09 ASSESSMENT — PAIN SCALES - GENERAL
PAINLEVEL_OUTOF10: 0
PAINLEVEL_OUTOF10: 0

## 2023-05-09 ASSESSMENT — PAIN - FUNCTIONAL ASSESSMENT
PAIN_FUNCTIONAL_ASSESSMENT: NONE - DENIES PAIN
PAIN_FUNCTIONAL_ASSESSMENT: ACTIVITIES ARE NOT PREVENTED

## 2023-05-09 ASSESSMENT — PAIN DESCRIPTION - LOCATION: LOCATION: ABDOMEN

## 2023-05-09 ASSESSMENT — PAIN DESCRIPTION - ORIENTATION: ORIENTATION: MID

## 2023-05-09 ASSESSMENT — PAIN DESCRIPTION - ONSET: ONSET: ON-GOING

## 2023-05-09 ASSESSMENT — PAIN DESCRIPTION - PAIN TYPE: TYPE: SURGICAL PAIN

## 2023-05-09 ASSESSMENT — PAIN DESCRIPTION - FREQUENCY: FREQUENCY: INTERMITTENT

## 2023-05-09 NOTE — ANESTHESIA PRE PROCEDURE
Department of Anesthesiology  Preprocedure Note       Name:  Poonam Hopkins   Age:  75 y.o.  :  1955                                          MRN:  [id number]         Date:  2023      Surgeon: Clau Polo):  Jeovanny Bowles MD    Procedure: Procedure(s): UMBILICAL KELOID  EXCISION    Medications prior to admission:   Prior to Admission medications    Medication Sig Start Date End Date Taking? Authorizing Provider   Multiple Vitamins-Minerals (THERAPEUTIC MULTIVITAMIN-MINERALS) tablet Take 1 tablet by mouth daily Women over 50    Historical Provider, MD   vitamin E 1000 units capsule Take 1 capsule by mouth daily    Historical Provider, MD   Cholecalciferol (VITAMIN D3) 1.25 MG (91952 UT) CAPS Take by mouth    Historical Provider, MD   Multiple Vitamins-Minerals (THERAPEUTIC MULTIVITAMIN-MINERALS) tablet Take 1 tablet by mouth daily Hair, Skin and Nails    Historical Provider, MD   acetaminophen (TYLENOL) 325 MG tablet Take 2 tablets by mouth every 6 hours as needed for Pain    Historical Provider, MD   famotidine (PEPCID) 40 MG tablet Take 1 tablet by mouth daily 23   Tiffanie Eason MD   metoprolol succinate (TOPROL XL) 25 MG extended release tablet Take 1 tablet by mouth daily 23   Coleman Mccarthy MD   diphenhydrAMINE (BENADRYL) 25 MG tablet Take 1 tablet by mouth as needed for Itching    Historical Provider, MD   simvastatin (ZOCOR) 20 MG tablet Take 1 tablet by mouth nightly 22   DENVER Ledesma CNP   lisinopril (PRINIVIL;ZESTRIL) 20 MG tablet Take 1 tablet by mouth in the morning and at bedtime 22   DENVER Ledesma CNP   triamterene-hydroCHLOROthiazide (MAXZIDE-25) 37.5-25 MG per tablet Take 1 tablet by mouth daily 22   DENVER Ledesma CNP   famotidine (PEPCID) 10 MG tablet Take 1 tablet by mouth daily    Historical Provider, MD   lidocaine-prilocaine (EMLA) 2.5-2.5 % cream Apply topically as needed.   Patient not taking: Reported on

## 2023-05-09 NOTE — DISCHARGE INSTRUCTIONS
Patient Discharge Instructions  Dr. Breana Chavez  [phone]    Discharge Date:  5/9/2023    Discharged To: Home      RESUME ACTIVITY:      BATHING:   Recommend showering daily but no bath tub or submerging incision under water    Wound:    Keep wound dry and clean. May shower as instructed above. Dressing: Your incision is covered with glue that will fall off with time. You may leave these incisions uncovered with any additional dressings    DRIVING:   3-5 days. No driving until off narcotic pain medications and walking comfortably    RETURN TO WORK: when cleared after your follow up office visit    WALKING:    As tolerated     STAIRS:    As tolerated    LIFTING:   Less than 10 pounds for one week    DIET:    Regular diet as tolerated. SPECIAL INSTRUCTIONS:     Call the office at [phone]  if you have a fever greater than or equal to 101 oF or if your incision becomes red, tender, or has drainage of pus. If follow up appointment was not given to you, call the Surgical Clinic at [phone] for follow up appointment with Dr. Libby Duncan or Rafael Morrow in:  1-2 weeks. Patient instructions for Post-Operative Care    Your Policy Manager skin Affix high-viscosity tissue adhesive(2-octyl cyanocrylate) is a sterile liquid skin adhesive that holds easily approximated wound edges together. The adhesive forms a film that remains in place for 5-10 days and naturally sloughs off as the skin is renewed. HOW TO CARE FOR YOUR WOUND     Keep the wound dry. Do not soak or scrub the wound area. To dry, gently pat with a soft towel or cloth. If bandaged, keep the bandage dry or replace it if it becomes wet. Avoid topical medications. Do not apply liquid or ointment medications, lotions, creams, petroleum jelly, mineral oils or any other product to your wound while skin Affix adhesive film is in place. Do not rub, scratch or pick at the wound.   Doing so may prevent the wound from

## 2023-05-09 NOTE — BRIEF OP NOTE
Brief Postoperative Note      Patient: Philip Argueta  YOB: 1955  MRN: [id number]    Date of Procedure: 5/9/2023    Pre-Op Diagnosis Codes:     * Umbilical pain [C88.03]    Post-Op Diagnosis: Same with Umbilical Keloid       Procedure(s):   UMBILICAL KELOID  EXCISION    Surgeon(s):  Deng Springer MD    Assistant:  First Assistant: Sasha Tamez PA-C    Anesthesia: Monitor Anesthesia Care    Estimated Blood Loss (mL): Minimal    Complications: None    Specimens:   ID Type Source Tests Collected by Time Destination   A : Umbilical Keloid  Tissue Tissue SURGICAL PATHOLOGY Deng Springer MD 5/9/2023 8009        Implants:  * No implants in log *      Drains: * No LDAs found *    Findings: As Above  This procedure was not performed to treat primary cutaneous melanoma through wide local excision      Electronically signed by Sasha Tamez PA-C on 5/9/2023 at 8:08 AM

## 2023-05-09 NOTE — ANESTHESIA POSTPROCEDURE EVALUATION
Department of Anesthesiology  Postprocedure Note    Patient: Karolyn Cornejo  MRN: [id number]  YOB: 1955  Date of evaluation: 5/9/2023      Procedure Summary     Date: 05/09/23 Room / Location: [place] [address] / [facility]    Anesthesia Start: 8705 Anesthesia Stop: Vish Pratt    Procedure: UMBILICAL KELOID  EXCISION (Abdomen) Diagnosis:       Umbilical pain      (Keloid of skin [P89.2])      (Umbilical pain [P62.82])    Surgeons: Yovany Garcia MD Responsible Provider: Fannie Hartley MD    Anesthesia Type: MAC ASA Status: 3          Anesthesia Type: No value filed.     Lorena Phase I: Lorena Score: 10    Lorena Phase II:        Anesthesia Post Evaluation    Patient location during evaluation: bedside  Patient participation: complete - patient participated  Level of consciousness: awake and alert  Pain score: 0  Airway patency: patent  Nausea & Vomiting: no nausea  Complications: no  Cardiovascular status: hemodynamically stable  Respiratory status: room air  Hydration status: euvolemic  Multimodal analgesia pain management approach

## 2023-05-09 NOTE — PROGRESS NOTES
Outpatient Pharmacy Progress Note for Meds-to-Beds    Total number of Prescriptions Filled: 1  The following medications were dispensed to the patient during the discharge process:  Norco    Additional Documentation:  Patient picked-up the medication(s) in the OP Pharmacy      Thank you for letting us serve your patients.   [address] [place]    [address], [address] [facility]    Phone: [phone]    Fax: [phone]

## 2023-05-09 NOTE — PROGRESS NOTES
811 pt recd from OR to sds 17. Report at bedside from [facility] SYS  PEYTON, pt denies pain or nausea. 815 head of bed up.  Juice provided  825 pt denies needs  832 discharge instructions reviewed with patient and family, verbalized understanding  837 ambulated to bathroom  848 discharged to car via wheelchair

## 2023-05-10 ENCOUNTER — HOSPITAL ENCOUNTER (OUTPATIENT)
Dept: RADIATION ONCOLOGY | Age: 68
Discharge: HOME | End: 2023-05-10
Payer: MEDICARE

## 2023-05-10 ENCOUNTER — TELEPHONE (OUTPATIENT)
Dept: BARIATRICS/WEIGHT MGMT | Age: 68
End: 2023-05-10

## 2023-05-10 PROCEDURE — 77412 RADIATION TX DELIVERY LVL 3: CPT | Performed by: RADIOLOGY

## 2023-05-10 NOTE — TELEPHONE ENCOUNTER
Post-op Phone Call Follow-up  DR. Lisa Rabago is 1 days s/p UMBILICAL KELOID  EXCISION. I called the pt today to see how they were doing post-operatively. The pt's pain is  well controlled with current pain control regimen. Pt's incisions are doing well. Denies erythema, swelling or drainage. Pt is tolerating eating without N/V, and is  having bowel movements. I reviewed the post-operative instructions, addressed any concerns and answered the patient's questions.      I confirmed the patient's post-op appointment on Visit date not found        Christopher Peter MA

## 2023-05-11 ENCOUNTER — TELEPHONE (OUTPATIENT)
Dept: SURGERY | Age: 68
End: 2023-05-11

## 2023-05-11 ENCOUNTER — HOSPITAL ENCOUNTER (OUTPATIENT)
Dept: RADIATION ONCOLOGY | Age: 68
Discharge: HOME | End: 2023-05-11
Payer: MEDICARE

## 2023-05-11 VITALS — BODY MASS INDEX: 29.6 KG/M2 | WEIGHT: 189 LBS

## 2023-05-11 DIAGNOSIS — G89.18 POSTOPERATIVE PAIN: Primary | ICD-10-CM

## 2023-05-11 PROCEDURE — 77412 RADIATION TX DELIVERY LVL 3: CPT | Performed by: RADIOLOGY

## 2023-05-11 RX ORDER — TRAMADOL HYDROCHLORIDE 50 MG/1
50 TABLET ORAL EVERY 6 HOURS PRN
COMMUNITY

## 2023-05-11 RX ORDER — TRAMADOL HYDROCHLORIDE 50 MG/1
50 TABLET ORAL EVERY 6 HOURS PRN
Qty: 20 TABLET | Refills: 0 | Status: SHIPPED | OUTPATIENT
Start: 2023-05-11 | End: 2023-05-16

## 2023-05-11 ASSESSMENT — PAIN DESCRIPTION - DESCRIPTORS: DESCRIPTORS: BURNING;SORE

## 2023-05-11 ASSESSMENT — PAIN SCALES - GENERAL: PAINLEVEL_OUTOF10: 4

## 2023-05-11 ASSESSMENT — PAIN - FUNCTIONAL ASSESSMENT: PAIN_FUNCTIONAL_ASSESSMENT: PREVENTS OR INTERFERES SOME ACTIVE ACTIVITIES AND ADLS

## 2023-05-11 ASSESSMENT — PAIN DESCRIPTION - FREQUENCY: FREQUENCY: INTERMITTENT

## 2023-05-11 ASSESSMENT — PAIN DESCRIPTION - LOCATION: LOCATION: ABDOMEN;UMBILICUS

## 2023-05-11 ASSESSMENT — PAIN DESCRIPTION - ONSET: ONSET: ON-GOING

## 2023-05-11 ASSESSMENT — PAIN DESCRIPTION - PAIN TYPE: TYPE: ACUTE PAIN;SURGICAL PAIN

## 2023-05-11 NOTE — PROGRESS NOTES
Weekly Radiation Treatment Progress Note    DATE OF SERVICE: 5/11/2023     DIAGNOSIS: keloid umbilicus         TREATMENT COURSE:         Site: keloid surg bed   Current Total Radiation Dose: 1800 cGy  Fraction: 3/3    Pt doing well. Energy okay. Having pain, had norco but made her itch (history of this). She has tried tramadol in the past and doesn't think she had any issues. EXAM  Wt Readings from Last 3 Encounters:   05/09/23 190 lb (86.2 kg)   04/24/23 198 lb 3.2 oz (89.9 kg)   03/30/23 193 lb 3.2 oz (87.6 kg)     NAD       chart, plan reviewed    A/P:   Tolerating RT well  Finished today. RTC 1 month. Rx tramadol 50 mg q6hr x 5 days.        Electronically signed by Carol Burkett MD on 5/11/2023 at 2:55 PM

## 2023-05-11 NOTE — PROGRESS NOTES
Radiation Oncology  Treatment Completion Summary  Encounter Date: 2023 2:59 PM    Ms. Florentino Carl is a 75 y.o. female  : 1955  MRN: [id number]  [facility]      FOLLOW UP PHYSICIANS:   Primary Care: Wanda Bonilla MD         DIAGNOSIS: umbilicus keloid    TREATMENT COURSE:       HISTORY:  Florentino Carl is a 75 y.o. female with the above referenced diagnosis. Complete details on history of present illness please see my initial consultation note. The patient has recently completed a course of adjuvant radiation therapy and what follows is a description of the treatments received:    ANATOMIC SITE: umbilicus surg bed   BEAM ORIENTATION: electrons  ENERGY: 6 MeV photons  DOSE PER FRACTION: 6Gy  TOTAL DOSE: 18Gy    ELAPSED DAYS: 23 - 23    TREATMENT TOLERANCE:   Overall, the patient tolerated radiation therapy quite well.     FOLLOW-UP PLANS:   The patient is to return to see me in 1 month       Electronically signed by Marshall Dent MD on 2023 at 2:59 PM

## 2023-05-11 NOTE — TELEPHONE ENCOUNTER
Patient stated she had hives/rash after taking Hydrocodone. Patient stated she took benadryl which helped, no fever,N/V. Patient wants to know what can she take for pain management due to the allergies the patient has - had UMB keloid excision on 05/09/2023.

## 2023-05-11 NOTE — PLAN OF CARE
Care plan reviewed. _Pt c/o discomfort in abd, especially with movement, rates \"4/10\". Pt discontinued Norco due to intense itching. Tramadol rx sent per Dr. Sage Mathis. _Pt with small swelling/ bruising above incision, advised to alert surgeon if worsens. Abd soft, reports BM daily.

## 2023-05-16 NOTE — OP NOTE
Amparo Blanco  1955 5/16/23        Pre-operative Diagnosis:  Umbilical keloid lesion 3.5 cm    Post-operative Diagnosis:  Same    Procedure:   1. Excision of keloid from umbilicus measuring 3.5 cm with umbilical plasty     2. Two layer closure 3.5 cm    Surgeon: Alberto Menard MD    Anesthesia: MAC/local    ASA Class: 3    Findings: same    Estimated Blood Loss:  Minimal                  Specimens: as above             Complications:  None; patient tolerated the procedure well. Disposition: PACU - hemodynamically stable. Condition: stable      The patient was seen again in the Holding Room. The risks, benefits, complications, treatment options, and expected outcomes were discussed with the patient. There was concurrence with the proposed plan, and informed consent was obtained. The site of surgery was properly noted/marked. The patient was taken to the Operating Room, identified as Amparo Blanco, and the procedure verified. A Time Out was held and the above information confirmed. The patient was brought to the operating room and positioned supine. After induction of anesthesia, the abdomen was prepped and draped in standard sterile fashion. 1% lidocaine was infiltrated and an elliptical incision was made to remove the large deep keloid extending into the umbilicus with surrounding normal tissue. The specimen measured 3.5 cm leaving 3.5 cm wound defect. The umblicus was detached from the fascia to completely remove the lesion. Hemostasis was acquired using Bovie cautery. Umbilical plasty was performed. The wound irrigated and closed in two layers using 3-0 Vicryl for the deep dermal tissue and then interuppted 4-0 vicryl for the skin.       Alberto Menard MD

## 2023-05-22 ENCOUNTER — OFFICE VISIT (OUTPATIENT)
Dept: SURGERY | Age: 68
End: 2023-05-22

## 2023-05-22 VITALS
BODY MASS INDEX: 29.81 KG/M2 | WEIGHT: 189.9 LBS | SYSTOLIC BLOOD PRESSURE: 122 MMHG | DIASTOLIC BLOOD PRESSURE: 82 MMHG | HEIGHT: 67 IN

## 2023-05-22 DIAGNOSIS — L91.0 KELOID OF SKIN: Primary | ICD-10-CM

## 2023-05-25 NOTE — PROGRESS NOTES
Chief Complaint   Patient presents with    Post-Op Check     1st po umb keloid exc 05/09/23         SUBJECTIVE:  Patient here for post op visit. Pain is minimal.  Wounds: minbruising and no discharge. Past Surgical History:   Procedure Laterality Date    ABDOMEN SURGERY N/A 5/4/0128    UMBILICAL KELOID  EXCISION performed by Shelby Fajardo MD at [facility]      4/24/2006-Left heart cath-    CARDIAC CATHETERIZATION  03/11/2014    EF 55% Left anterior descending artery has patent stent, proximal LAD has eccentric lesion of 30-40% unchanged from last time, The right coronary artery is a dominant vessel with abberant take off, has 40-50% mid RCA stenosis, proximal stent is patent. No evidence of hemodynamically significant coronary artery disease, I have tried to do FFR of RCA, however, because of  aberrant take off, could no    CORONARY ANGIOPLASTY WITH STENT PLACEMENT  8/23/13 8/23/13 LAD 7/2012; 4/25/2006- PTCA w/ stent-> RCA    EYE SURGERY Bilateral     cataracts removed    HERNIA REPAIR  74/43/3186    umbilical    HYSTERECTOMY (CERVIX STATUS UNKNOWN)      HYSTERECTOMY, TOTAL ABDOMINAL (CERVIX REMOVED)      TONSILLECTOMY AND ADENOIDECTOMY      ULNAR TUNNEL RELEASE  11/2006    Ulnar nerve tranfer Left elbow    WRIST SURGERY  7/2008    Left wrist     Past Medical History:   Diagnosis Date    Anxiety     CAD (coronary artery disease)     Chest pain     Chronic back pain     Depression     Essential hypertension 04/02/2021    GERD (gastroesophageal reflux disease)     H/O cardiovascular stress test     7/26/12-No scintigraphic evidence of inducible myocardial ischemia. Global Left ventricular sytolic function normal. Rest EF 64%. No ECG changes. Unremarkable pharmacological stress test. Normal Myocardial Perfusion Study. 3/30/11- EF70% Normal Myocardial Perfusion study.  2/18/2010 EF =>55%;1/2009 EF 65%, 1/2008, 1/2007, 5/2006, 2/2006 EF70%    H/O Doppler ultrasound 03/03/2008     Carotid

## 2023-06-05 ENCOUNTER — TELEPHONE (OUTPATIENT)
Dept: SURGERY | Age: 68
End: 2023-06-05

## 2023-06-05 NOTE — TELEPHONE ENCOUNTER
Patient called stating she is still having pain. The same pain she had prior to surgery. She would like to know if this is normal.patient had keloid removed from abdomen on 5/9. Patient would like a return call today.

## 2023-06-06 DIAGNOSIS — R52 PAIN: Primary | ICD-10-CM

## 2023-06-06 RX ORDER — TRAMADOL HYDROCHLORIDE 50 MG/1
50 TABLET ORAL EVERY 6 HOURS PRN
Qty: 20 TABLET | Refills: 0 | Status: SHIPPED | OUTPATIENT
Start: 2023-06-06 | End: 2023-06-11

## 2023-06-26 ENCOUNTER — TELEPHONE (OUTPATIENT)
Dept: SURGERY | Age: 68
End: 2023-06-26

## 2023-06-26 ENCOUNTER — SCHEDULED TELEPHONE ENCOUNTER (OUTPATIENT)
Dept: SURGERY | Age: 68
End: 2023-06-26

## 2023-06-26 DIAGNOSIS — Z48.89 ENCOUNTER FOR POSTOPERATIVE CARE: Primary | ICD-10-CM

## 2023-06-26 PROCEDURE — 99024 POSTOP FOLLOW-UP VISIT: CPT | Performed by: PHYSICIAN ASSISTANT

## 2023-06-26 PROCEDURE — APPNB30 APP NON BILLABLE TIME 0-30 MINS: Performed by: PHYSICIAN ASSISTANT

## 2023-07-17 ENCOUNTER — OFFICE VISIT (OUTPATIENT)
Dept: SURGERY | Age: 68
End: 2023-07-17

## 2023-07-17 VITALS
BODY MASS INDEX: 29.95 KG/M2 | WEIGHT: 190.8 LBS | HEART RATE: 82 BPM | SYSTOLIC BLOOD PRESSURE: 122 MMHG | DIASTOLIC BLOOD PRESSURE: 62 MMHG | HEIGHT: 67 IN

## 2023-07-17 DIAGNOSIS — R10.33 UMBILICAL PAIN: Primary | ICD-10-CM

## 2023-07-17 PROCEDURE — APPNB30 APP NON BILLABLE TIME 0-30 MINS: Performed by: PHYSICIAN ASSISTANT

## 2023-07-17 PROCEDURE — 99024 POSTOP FOLLOW-UP VISIT: CPT | Performed by: PHYSICIAN ASSISTANT

## 2023-07-17 RX ORDER — CYCLOBENZAPRINE HCL 5 MG
5 TABLET ORAL 3 TIMES DAILY PRN
Qty: 30 TABLET | Refills: 0 | Status: SHIPPED | OUTPATIENT
Start: 2023-07-17 | End: 2023-07-27

## 2023-07-17 NOTE — PROGRESS NOTES
Chief Complaint   Patient presents with    Post-Op Check     4th P/O Umb Keloid Excision @ [facility] 5/9/23  Having P/O Pain         SUBJECTIVE:  Patient presents today for her 4th post op visit following umbilical keloid excision. Pt reports that pain is intermittent at the umbilicus. Continues to have itching. Pain increases with some activities, but this is not consistent. Also increases with direct pressure  Pt is  tolerating a regular diet. BMs are WNL. Incisions: well healed. Past Surgical History:   Procedure Laterality Date    ABDOMEN SURGERY N/A 7/6/3739    UMBILICAL KELOID  EXCISION performed by Emelie Merlin, MD at [facility]      4/24/2006-Left heart cath-    CARDIAC CATHETERIZATION  03/11/2014    EF 55% Left anterior descending artery has patent stent, proximal LAD has eccentric lesion of 30-40% unchanged from last time, The right coronary artery is a dominant vessel with abberant take off, has 40-50% mid RCA stenosis, proximal stent is patent. No evidence of hemodynamically significant coronary artery disease, I have tried to do FFR of RCA, however, because of  aberrant take off, could no    CORONARY ANGIOPLASTY WITH STENT PLACEMENT  8/23/13 8/23/13 LAD 7/2012; 4/25/2006- PTCA w/ stent-> RCA    EYE SURGERY Bilateral     cataracts removed    HERNIA REPAIR  51/23/4108    umbilical    HYSTERECTOMY (CERVIX STATUS UNKNOWN)      HYSTERECTOMY, TOTAL ABDOMINAL (CERVIX REMOVED)      TONSILLECTOMY AND ADENOIDECTOMY      ULNAR TUNNEL RELEASE  11/2006    Ulnar nerve tranfer Left elbow    WRIST SURGERY  7/2008    Left wrist     Past Medical History:   Diagnosis Date    Anxiety     CAD (coronary artery disease)     Chest pain     Chronic back pain     Depression     Essential hypertension 04/02/2021    GERD (gastroesophageal reflux disease)     H/O cardiovascular stress test     7/26/12-No scintigraphic evidence of inducible myocardial ischemia.  Global Left ventricular sytolic function

## 2023-07-26 ENCOUNTER — HOSPITAL ENCOUNTER (OUTPATIENT)
Dept: CT IMAGING | Age: 68
Discharge: HOME | End: 2023-07-26
Payer: MEDICARE

## 2023-07-26 DIAGNOSIS — R10.33 UMBILICAL PAIN: ICD-10-CM

## 2023-07-26 PROCEDURE — 74176 CT ABD & PELVIS W/O CONTRAST: CPT

## 2023-08-03 ENCOUNTER — OFFICE VISIT (OUTPATIENT)
Dept: SURGERY | Age: 68
End: 2023-08-03
Payer: MEDICARE

## 2023-08-03 VITALS
HEART RATE: 67 BPM | WEIGHT: 191.8 LBS | OXYGEN SATURATION: 100 % | BODY MASS INDEX: 30.04 KG/M2 | SYSTOLIC BLOOD PRESSURE: 126 MMHG | DIASTOLIC BLOOD PRESSURE: 73 MMHG

## 2023-08-03 DIAGNOSIS — R10.33 PERIUMBILICAL PAIN: Primary | ICD-10-CM

## 2023-08-03 PROCEDURE — 3074F SYST BP LT 130 MM HG: CPT | Performed by: PHYSICIAN ASSISTANT

## 2023-08-03 PROCEDURE — G8400 PT W/DXA NO RESULTS DOC: HCPCS | Performed by: PHYSICIAN ASSISTANT

## 2023-08-03 PROCEDURE — 1123F ACP DISCUSS/DSCN MKR DOCD: CPT | Performed by: PHYSICIAN ASSISTANT

## 2023-08-03 PROCEDURE — 3017F COLORECTAL CA SCREEN DOC REV: CPT | Performed by: PHYSICIAN ASSISTANT

## 2023-08-03 PROCEDURE — 99213 OFFICE O/P EST LOW 20 MIN: CPT | Performed by: PHYSICIAN ASSISTANT

## 2023-08-03 PROCEDURE — G8417 CALC BMI ABV UP PARAM F/U: HCPCS | Performed by: PHYSICIAN ASSISTANT

## 2023-08-03 PROCEDURE — 1090F PRES/ABSN URINE INCON ASSESS: CPT | Performed by: PHYSICIAN ASSISTANT

## 2023-08-03 PROCEDURE — 1036F TOBACCO NON-USER: CPT | Performed by: PHYSICIAN ASSISTANT

## 2023-08-03 PROCEDURE — G8427 DOCREV CUR MEDS BY ELIG CLIN: HCPCS | Performed by: PHYSICIAN ASSISTANT

## 2023-08-03 PROCEDURE — 3078F DIAST BP <80 MM HG: CPT | Performed by: PHYSICIAN ASSISTANT

## 2023-08-03 RX ORDER — CYCLOBENZAPRINE HCL 10 MG
10 TABLET ORAL 3 TIMES DAILY PRN
COMMUNITY

## 2023-08-03 ASSESSMENT — PATIENT HEALTH QUESTIONNAIRE - PHQ9
SUM OF ALL RESPONSES TO PHQ QUESTIONS 1-9: 0
SUM OF ALL RESPONSES TO PHQ QUESTIONS 1-9: 0
SUM OF ALL RESPONSES TO PHQ9 QUESTIONS 1 & 2: 0
SUM OF ALL RESPONSES TO PHQ QUESTIONS 1-9: 0
SUM OF ALL RESPONSES TO PHQ QUESTIONS 1-9: 0
2. FEELING DOWN, DEPRESSED OR HOPELESS: 0
1. LITTLE INTEREST OR PLEASURE IN DOING THINGS: 0

## 2023-08-03 ASSESSMENT — ENCOUNTER SYMPTOMS
COLOR CHANGE: 0
RECTAL PAIN: 0
ANAL BLEEDING: 0
ABDOMINAL PAIN: 1
STRIDOR: 0
BACK PAIN: 0
APNEA: 0
SORE THROAT: 0
EYE ITCHING: 0
CHOKING: 0
VOMITING: 0
CONSTIPATION: 0
NAUSEA: 0
EYE REDNESS: 0
PHOTOPHOBIA: 0

## 2023-08-03 NOTE — PROGRESS NOTES
Chief Complaint   Patient presents with    Follow-up     5th fu - umb keloid excision [facility] 5/9/23. Ct a&p 7/26/23         SUBJECTIVE:  HPI: Patient presents today with complaints of periumbilical abd pain, worse to the left abdomen. Pt did get CT recently and is here for review. Pain is overall unchanged from her last visit. She has tried flexeril, and did have some relief of pain, but it is still present. She is understandably frustrated with the lack of progress in regards to her pain and continuing to not get answers as to the cause. CT was reviewed by myself and Dr. Milton Carranza and discussed with the pt:  IMPRESSION:  No CT evidence of acute intra-abdominal process. Punctate nonobstructing stones right kidney. Coronary artery calcifications. I have reviewed the patient's (pertinent information to this visit) medical history, family history (scanned in  the Media tab under \"patient questioner\"), social history and review of systems with the patient today in the office. Past Surgical History:   Procedure Laterality Date    ABDOMEN SURGERY N/A 8/4/6893    UMBILICAL KELOID  EXCISION performed by Michael Madden MD at [facility]      4/24/2006-Left heart cath-    CARDIAC CATHETERIZATION  03/11/2014    EF 55% Left anterior descending artery has patent stent, proximal LAD has eccentric lesion of 30-40% unchanged from last time, The right coronary artery is a dominant vessel with abberant take off, has 40-50% mid RCA stenosis, proximal stent is patent.  No evidence of hemodynamically significant coronary artery disease, I have tried to do FFR of RCA, however, because of  aberrant take off, could no    CORONARY ANGIOPLASTY WITH STENT PLACEMENT  8/23/13 8/23/13 LAD 7/2012; 4/25/2006- PTCA w/ stent-> RCA    EYE SURGERY Bilateral     cataracts removed    HERNIA REPAIR  12/89/2036    umbilical    HYSTERECTOMY (CERVIX STATUS UNKNOWN)      HYSTERECTOMY, TOTAL ABDOMINAL (CERVIX REMOVED)

## 2023-08-30 ENCOUNTER — TELEPHONE (OUTPATIENT)
Dept: CARDIOLOGY CLINIC | Age: 68
End: 2023-08-30

## 2023-10-23 ENCOUNTER — TRANSCRIBE ORDERS (OUTPATIENT)
Dept: CARDIOLOGY CLINIC | Age: 68
End: 2023-10-23

## 2023-10-23 ENCOUNTER — NURSE ONLY (OUTPATIENT)
Dept: CARDIOLOGY CLINIC | Age: 68
End: 2023-10-23

## 2023-10-23 ENCOUNTER — TELEPHONE (OUTPATIENT)
Dept: CARDIOLOGY CLINIC | Age: 68
End: 2023-10-23

## 2023-10-23 ENCOUNTER — OFFICE VISIT (OUTPATIENT)
Dept: CARDIOLOGY CLINIC | Age: 68
End: 2023-10-23
Payer: MEDICARE

## 2023-10-23 VITALS
WEIGHT: 189.8 LBS | DIASTOLIC BLOOD PRESSURE: 74 MMHG | HEART RATE: 55 BPM | SYSTOLIC BLOOD PRESSURE: 130 MMHG | HEIGHT: 66 IN | BODY MASS INDEX: 30.5 KG/M2

## 2023-10-23 DIAGNOSIS — Z01.810 PRE-OPERATIVE CARDIOVASCULAR EXAMINATION: ICD-10-CM

## 2023-10-23 DIAGNOSIS — I25.10 CORONARY ARTERY DISEASE INVOLVING NATIVE CORONARY ARTERY OF NATIVE HEART WITHOUT ANGINA PECTORIS: ICD-10-CM

## 2023-10-23 DIAGNOSIS — R06.02 SHORTNESS OF BREATH: ICD-10-CM

## 2023-10-23 DIAGNOSIS — Z01.810 PRE-OPERATIVE CARDIOVASCULAR EXAMINATION: Primary | ICD-10-CM

## 2023-10-23 DIAGNOSIS — E78.2 MIXED HYPERLIPIDEMIA: ICD-10-CM

## 2023-10-23 DIAGNOSIS — I35.1 NONRHEUMATIC AORTIC VALVE INSUFFICIENCY: Primary | ICD-10-CM

## 2023-10-23 DIAGNOSIS — R55 VASOVAGAL ATTACK: ICD-10-CM

## 2023-10-23 DIAGNOSIS — R55 VASOVAGAL SYNCOPE: ICD-10-CM

## 2023-10-23 DIAGNOSIS — I10 ESSENTIAL HYPERTENSION: ICD-10-CM

## 2023-10-23 DIAGNOSIS — R55 SYNCOPE, UNSPECIFIED SYNCOPE TYPE: Primary | ICD-10-CM

## 2023-10-23 DIAGNOSIS — I87.2 VENOUS REFLUX: ICD-10-CM

## 2023-10-23 DIAGNOSIS — R60.0 LEG EDEMA: ICD-10-CM

## 2023-10-23 PROCEDURE — 1036F TOBACCO NON-USER: CPT | Performed by: INTERNAL MEDICINE

## 2023-10-23 PROCEDURE — 3017F COLORECTAL CA SCREEN DOC REV: CPT | Performed by: INTERNAL MEDICINE

## 2023-10-23 PROCEDURE — 3075F SYST BP GE 130 - 139MM HG: CPT | Performed by: INTERNAL MEDICINE

## 2023-10-23 PROCEDURE — 1123F ACP DISCUSS/DSCN MKR DOCD: CPT | Performed by: INTERNAL MEDICINE

## 2023-10-23 PROCEDURE — G8484 FLU IMMUNIZE NO ADMIN: HCPCS | Performed by: INTERNAL MEDICINE

## 2023-10-23 PROCEDURE — 93000 ELECTROCARDIOGRAM COMPLETE: CPT | Performed by: INTERNAL MEDICINE

## 2023-10-23 PROCEDURE — G8427 DOCREV CUR MEDS BY ELIG CLIN: HCPCS | Performed by: INTERNAL MEDICINE

## 2023-10-23 PROCEDURE — G8417 CALC BMI ABV UP PARAM F/U: HCPCS | Performed by: INTERNAL MEDICINE

## 2023-10-23 PROCEDURE — 1090F PRES/ABSN URINE INCON ASSESS: CPT | Performed by: INTERNAL MEDICINE

## 2023-10-23 PROCEDURE — 3078F DIAST BP <80 MM HG: CPT | Performed by: INTERNAL MEDICINE

## 2023-10-23 PROCEDURE — 99214 OFFICE O/P EST MOD 30 MIN: CPT | Performed by: INTERNAL MEDICINE

## 2023-10-23 PROCEDURE — G8400 PT W/DXA NO RESULTS DOC: HCPCS | Performed by: INTERNAL MEDICINE

## 2023-10-23 RX ORDER — CLONAZEPAM 0.5 MG/1
TABLET ORAL
COMMUNITY
Start: 2023-09-26

## 2023-10-23 RX ORDER — ESTRADIOL 1 MG/1
1 TABLET ORAL DAILY
COMMUNITY
Start: 2023-10-08

## 2023-10-23 RX ORDER — CITALOPRAM 20 MG/1
TABLET ORAL
COMMUNITY
Start: 2023-10-19

## 2023-10-23 RX ORDER — GLYCOPYRROLATE 1 MG/1
1 TABLET ORAL DAILY
COMMUNITY
Start: 2023-09-15

## 2023-10-23 RX ORDER — OXYBUTYNIN CHLORIDE 5 MG/1
5 TABLET, EXTENDED RELEASE ORAL DAILY
COMMUNITY
Start: 2023-10-02

## 2023-10-23 NOTE — PROGRESS NOTES
30 days e-cardio monitor placed.  # [id number]. Instructed patient on how to record the event and to call monitoring center at [phone] if any problems arise. Instructed patient to disconnect the lead wires from the electrodes before bathing or showering and reattach them afterwards. Instructed patient that the electrodes should be changed every 3 days or if they no longer adhere to the skin. Patient to mail package after the monitor has ended. Patient verbalized understanding.

## 2023-10-23 NOTE — PATIENT INSTRUCTIONS
We are committed to providing you the best care possible. If you receive a survey after visiting one of our offices, please take time to share your experience concerning your physician office visit. These surveys are confidential and no health information about you is shared. We are eager to improve for you and we are counting on your feedback to help make that happen. **It is YOUR responsibilty to bring medication bottles and/or updated medication list to [address]. This will allow us to better serve you and all your healthcare needs**    Thank you for allowing us to care for you today! We want to ensure we can follow your treatment plan and we strive to give you the best outcomes and experience possible. If you ever have a life threatening emergency and call 911 - for an ambulance (EMS)   Our providers can only care for you at:   [facility] or [facility]. Even if you have someone take you or you drive yourself we can only care for you in a [facility]. Our providers are not setup at the other healthcare locations!

## 2023-10-24 ENCOUNTER — TELEPHONE (OUTPATIENT)
Dept: CARDIOLOGY CLINIC | Age: 68
End: 2023-10-24

## 2023-10-24 NOTE — TELEPHONE ENCOUNTER
Patient wanted it noted that when she passed out she was not yet taking the medication that Dr Dimitris Engel thought may cause it

## 2023-10-24 NOTE — TELEPHONE ENCOUNTER
When patient passed out in June she was not taking the medication that caused dizziness. (Note to Doctor)         Please advise.

## 2023-10-24 NOTE — TELEPHONE ENCOUNTER
[facility] [place]     Dr. Ronit Gibson     Tilt Table Procedure     Patient Name: Renu Hernandez   : 1955   MRN# [id number]     Date of Procedure: 23  Time: 8am Arrival Time: [address]: [facility])     X   If you have received orders for blood work please have it done on assigned date at [facility], [facility], or [address] [place] [address]. X Please do not have anything by mouth after midnight prior to or 8 hours before the procedure. X You will need to arrive at the hospital 1 hour prior to the procedure. When you arrive at the hospital please go to the registration desk. X You will need to arrange for someone to drive you home after the procedure. X Please wear comfortable clothing for the procedure. X If you are taking DYAZIDE (Triamterene/Hydrochlorothiazide) please do not take it the morning before your procedure.

## 2023-10-24 NOTE — TELEPHONE ENCOUNTER
Patient notified and confirmed SUNI scheduled 11/8/23 @ 8am with arrival @ 7am. Scheduled instruction call for 11/3/23.

## 2023-11-03 ENCOUNTER — HOSPITAL ENCOUNTER (OUTPATIENT)
Age: 68
Discharge: HOME | End: 2023-11-03

## 2023-11-08 ENCOUNTER — APPOINTMENT (OUTPATIENT)
Dept: NON INVASIVE DIAGNOSTICS | Age: 68
End: 2023-11-08
Payer: MEDICARE

## 2023-11-08 ENCOUNTER — HOSPITAL ENCOUNTER (OUTPATIENT)
Age: 68
Setting detail: OBSERVATION
Discharge: HOME | End: 2023-11-11
Attending: INTERNAL MEDICINE | Admitting: INTERNAL MEDICINE
Payer: MEDICARE

## 2023-11-08 ENCOUNTER — TELEPHONE (OUTPATIENT)
Dept: CARDIOLOGY CLINIC | Age: 68
End: 2023-11-08

## 2023-11-08 DIAGNOSIS — R00.1 SINUS BRADYCARDIA ON ECG: ICD-10-CM

## 2023-11-08 DIAGNOSIS — R00.1 BRADYCARDIA: Primary | ICD-10-CM

## 2023-11-08 DIAGNOSIS — R42 DIZZINESS: ICD-10-CM

## 2023-11-08 DIAGNOSIS — E83.42 HYPOMAGNESEMIA: ICD-10-CM

## 2023-11-08 DIAGNOSIS — I10 UNCONTROLLED HYPERTENSION: ICD-10-CM

## 2023-11-08 DIAGNOSIS — R00.1 SINUS BRADYCARDIA: ICD-10-CM

## 2023-11-08 DIAGNOSIS — I35.1 NONRHEUMATIC AORTIC VALVE INSUFFICIENCY: ICD-10-CM

## 2023-11-08 LAB
ALBUMIN SERPL-MCNC: 3.7 GM/DL (ref 3.4–5)
ALP BLD-CCNC: 96 IU/L (ref 40–128)
ALT SERPL-CCNC: 8 U/L (ref 10–40)
ANION GAP SERPL CALCULATED.3IONS-SCNC: 9 MMOL/L (ref 4–16)
AST SERPL-CCNC: 16 IU/L (ref 15–37)
BASOPHILS ABSOLUTE: 0 K/CU MM
BASOPHILS RELATIVE PERCENT: 0.4 % (ref 0–1)
BILIRUB SERPL-MCNC: 0.4 MG/DL (ref 0–1)
BUN SERPL-MCNC: 9 MG/DL (ref 6–23)
CALCIUM SERPL-MCNC: 9.6 MG/DL (ref 8.3–10.6)
CHLORIDE BLD-SCNC: 104 MMOL/L (ref 99–110)
CO2: 25 MMOL/L (ref 21–32)
CREAT SERPL-MCNC: 0.9 MG/DL (ref 0.6–1.1)
DIFFERENTIAL TYPE: ABNORMAL
EOSINOPHILS ABSOLUTE: 0.1 K/CU MM
EOSINOPHILS RELATIVE PERCENT: 2 % (ref 0–3)
GFR SERPL CREATININE-BSD FRML MDRD: >60 ML/MIN/1.73M2
GLUCOSE SERPL-MCNC: 90 MG/DL (ref 70–99)
HCT VFR BLD CALC: 35.6 % (ref 37–47)
HEMOGLOBIN: 11.7 GM/DL (ref 12.5–16)
IMMATURE NEUTROPHIL %: 0.2 % (ref 0–0.43)
INR BLD: 1.1 INDEX
LYMPHOCYTES ABSOLUTE: 1.2 K/CU MM
LYMPHOCYTES RELATIVE PERCENT: 21.8 % (ref 24–44)
MAGNESIUM: 1.7 MG/DL (ref 1.8–2.4)
MCH RBC QN AUTO: 30.8 PG (ref 27–31)
MCHC RBC AUTO-ENTMCNC: 32.9 % (ref 32–36)
MCV RBC AUTO: 93.7 FL (ref 78–100)
MONOCYTES ABSOLUTE: 0.7 K/CU MM
MONOCYTES RELATIVE PERCENT: 11.9 % (ref 0–4)
NUCLEATED RBC %: 0 %
PDW BLD-RTO: 14.9 % (ref 11.7–14.9)
PLATELET # BLD: 269 K/CU MM (ref 140–440)
PMV BLD AUTO: 9 FL (ref 7.5–11.1)
POTASSIUM SERPL-SCNC: 3.8 MMOL/L (ref 3.5–5.1)
PROTHROMBIN TIME: 13.9 SECONDS (ref 11.7–14.5)
RBC # BLD: 3.8 M/CU MM (ref 4.2–5.4)
SEGMENTED NEUTROPHILS ABSOLUTE COUNT: 3.5 K/CU MM
SEGMENTED NEUTROPHILS RELATIVE PERCENT: 63.7 % (ref 36–66)
SODIUM BLD-SCNC: 138 MMOL/L (ref 135–145)
TOTAL IMMATURE NEUTOROPHIL: 0.01 K/CU MM
TOTAL NUCLEATED RBC: 0 K/CU MM
TOTAL PROTEIN: 6.4 GM/DL (ref 6.4–8.2)
WBC # BLD: 5.5 K/CU MM (ref 4–10.5)

## 2023-11-08 PROCEDURE — 96375 TX/PRO/DX INJ NEW DRUG ADDON: CPT

## 2023-11-08 PROCEDURE — 85610 PROTHROMBIN TIME: CPT

## 2023-11-08 PROCEDURE — G0378 HOSPITAL OBSERVATION PER HR: HCPCS

## 2023-11-08 PROCEDURE — 6360000002 HC RX W HCPCS

## 2023-11-08 PROCEDURE — 99222 1ST HOSP IP/OBS MODERATE 55: CPT | Performed by: INTERNAL MEDICINE

## 2023-11-08 PROCEDURE — 96366 THER/PROPH/DIAG IV INF ADDON: CPT

## 2023-11-08 PROCEDURE — 80053 COMPREHEN METABOLIC PANEL: CPT

## 2023-11-08 PROCEDURE — 99285 EMERGENCY DEPT VISIT HI MDM: CPT

## 2023-11-08 PROCEDURE — 6360000002 HC RX W HCPCS: Performed by: NURSE PRACTITIONER

## 2023-11-08 PROCEDURE — 93005 ELECTROCARDIOGRAM TRACING: CPT | Performed by: NURSE PRACTITIONER

## 2023-11-08 PROCEDURE — 6370000000 HC RX 637 (ALT 250 FOR IP)

## 2023-11-08 PROCEDURE — 99223 1ST HOSP IP/OBS HIGH 75: CPT | Performed by: INTERNAL MEDICINE

## 2023-11-08 PROCEDURE — 85025 COMPLETE CBC W/AUTO DIFF WBC: CPT

## 2023-11-08 PROCEDURE — 96365 THER/PROPH/DIAG IV INF INIT: CPT

## 2023-11-08 PROCEDURE — 94761 N-INVAS EAR/PLS OXIMETRY MLT: CPT

## 2023-11-08 PROCEDURE — 2580000003 HC RX 258: Performed by: NURSE PRACTITIONER

## 2023-11-08 PROCEDURE — 83735 ASSAY OF MAGNESIUM: CPT

## 2023-11-08 PROCEDURE — 96376 TX/PRO/DX INJ SAME DRUG ADON: CPT

## 2023-11-08 PROCEDURE — APPNB60 APP NON BILLABLE TIME 46-60 MINS: Performed by: NURSE PRACTITIONER

## 2023-11-08 PROCEDURE — 1200000000 HC SEMI PRIVATE

## 2023-11-08 PROCEDURE — 6370000000 HC RX 637 (ALT 250 FOR IP): Performed by: NURSE PRACTITIONER

## 2023-11-08 RX ORDER — POTASSIUM CHLORIDE 7.45 MG/ML
10 INJECTION INTRAVENOUS PRN
Status: DISCONTINUED | OUTPATIENT
Start: 2023-11-08 | End: 2023-11-11 | Stop reason: HOSPADM

## 2023-11-08 RX ORDER — TRIAMTERENE AND HYDROCHLOROTHIAZIDE 37.5; 25 MG/1; MG/1
1 TABLET ORAL DAILY
Status: DISCONTINUED | OUTPATIENT
Start: 2023-11-08 | End: 2023-11-08

## 2023-11-08 RX ORDER — MAGNESIUM SULFATE IN WATER 40 MG/ML
2000 INJECTION, SOLUTION INTRAVENOUS PRN
Status: DISCONTINUED | OUTPATIENT
Start: 2023-11-08 | End: 2023-11-11 | Stop reason: HOSPADM

## 2023-11-08 RX ORDER — SODIUM CHLORIDE 9 MG/ML
INJECTION, SOLUTION INTRAVENOUS PRN
Status: DISCONTINUED | OUTPATIENT
Start: 2023-11-08 | End: 2023-11-11

## 2023-11-08 RX ORDER — CLONAZEPAM 0.5 MG/1
0.5 TABLET ORAL 2 TIMES DAILY PRN
Status: DISCONTINUED | OUTPATIENT
Start: 2023-11-08 | End: 2023-11-11 | Stop reason: HOSPADM

## 2023-11-08 RX ORDER — ONDANSETRON 4 MG/1
4 TABLET, ORALLY DISINTEGRATING ORAL EVERY 8 HOURS PRN
Status: DISCONTINUED | OUTPATIENT
Start: 2023-11-08 | End: 2023-11-11 | Stop reason: HOSPADM

## 2023-11-08 RX ORDER — TRIAMTERENE AND HYDROCHLOROTHIAZIDE 37.5; 25 MG/1; MG/1
1 TABLET ORAL ONCE
Status: DISCONTINUED | OUTPATIENT
Start: 2023-11-08 | End: 2023-11-11 | Stop reason: HOSPADM

## 2023-11-08 RX ORDER — CITALOPRAM 20 MG/1
20 TABLET ORAL DAILY
Status: DISCONTINUED | OUTPATIENT
Start: 2023-11-08 | End: 2023-11-11 | Stop reason: HOSPADM

## 2023-11-08 RX ORDER — ACETAMINOPHEN 500 MG
1000 TABLET ORAL
Status: COMPLETED | OUTPATIENT
Start: 2023-11-08 | End: 2023-11-08

## 2023-11-08 RX ORDER — ASPIRIN 81 MG/1
81 TABLET, CHEWABLE ORAL DAILY
Status: DISCONTINUED | OUTPATIENT
Start: 2023-11-08 | End: 2023-11-11 | Stop reason: HOSPADM

## 2023-11-08 RX ORDER — FAMOTIDINE 20 MG/1
40 TABLET, FILM COATED ORAL NIGHTLY
Status: DISCONTINUED | OUTPATIENT
Start: 2023-11-08 | End: 2023-11-11 | Stop reason: HOSPADM

## 2023-11-08 RX ORDER — SODIUM CHLORIDE 0.9 % (FLUSH) 0.9 %
5-40 SYRINGE (ML) INJECTION PRN
Status: DISCONTINUED | OUTPATIENT
Start: 2023-11-08 | End: 2023-11-11

## 2023-11-08 RX ORDER — CETIRIZINE HYDROCHLORIDE 10 MG/1
10 TABLET ORAL DAILY
Status: DISCONTINUED | OUTPATIENT
Start: 2023-11-08 | End: 2023-11-11 | Stop reason: HOSPADM

## 2023-11-08 RX ORDER — ALBUTEROL SULFATE 90 UG/1
2 AEROSOL, METERED RESPIRATORY (INHALATION) EVERY 4 HOURS PRN
Status: DISCONTINUED | OUTPATIENT
Start: 2023-11-08 | End: 2023-11-09

## 2023-11-08 RX ORDER — SODIUM CHLORIDE 0.9 % (FLUSH) 0.9 %
5-40 SYRINGE (ML) INJECTION EVERY 12 HOURS SCHEDULED
Status: DISCONTINUED | OUTPATIENT
Start: 2023-11-08 | End: 2023-11-11 | Stop reason: HOSPADM

## 2023-11-08 RX ORDER — ENOXAPARIN SODIUM 100 MG/ML
40 INJECTION SUBCUTANEOUS DAILY
Status: DISCONTINUED | OUTPATIENT
Start: 2023-11-08 | End: 2023-11-11 | Stop reason: HOSPADM

## 2023-11-08 RX ORDER — OXYBUTYNIN CHLORIDE 5 MG/1
5 TABLET, EXTENDED RELEASE ORAL NIGHTLY
Status: DISCONTINUED | OUTPATIENT
Start: 2023-11-08 | End: 2023-11-11 | Stop reason: HOSPADM

## 2023-11-08 RX ORDER — HYDRALAZINE HYDROCHLORIDE 20 MG/ML
10 INJECTION INTRAMUSCULAR; INTRAVENOUS EVERY 6 HOURS PRN
Status: DISCONTINUED | OUTPATIENT
Start: 2023-11-08 | End: 2023-11-11 | Stop reason: HOSPADM

## 2023-11-08 RX ORDER — DIPHENHYDRAMINE HCL 25 MG
25 TABLET ORAL DAILY PRN
Status: DISCONTINUED | OUTPATIENT
Start: 2023-11-08 | End: 2023-11-11 | Stop reason: HOSPADM

## 2023-11-08 RX ORDER — ACETAMINOPHEN 650 MG/1
650 SUPPOSITORY RECTAL EVERY 6 HOURS PRN
Status: DISCONTINUED | OUTPATIENT
Start: 2023-11-08 | End: 2023-11-11 | Stop reason: HOSPADM

## 2023-11-08 RX ORDER — PANTOPRAZOLE SODIUM 20 MG/1
20 TABLET, DELAYED RELEASE ORAL DAILY
Status: DISCONTINUED | OUTPATIENT
Start: 2023-11-09 | End: 2023-11-11 | Stop reason: HOSPADM

## 2023-11-08 RX ORDER — POTASSIUM CHLORIDE 20 MEQ/1
40 TABLET, EXTENDED RELEASE ORAL PRN
Status: DISCONTINUED | OUTPATIENT
Start: 2023-11-08 | End: 2023-11-11 | Stop reason: HOSPADM

## 2023-11-08 RX ORDER — ACETAMINOPHEN 325 MG/1
650 TABLET ORAL EVERY 6 HOURS PRN
Status: DISCONTINUED | OUTPATIENT
Start: 2023-11-08 | End: 2023-11-11 | Stop reason: HOSPADM

## 2023-11-08 RX ORDER — GINGER ROOT/GINGER ROOT EXT 262.5 MG
1 CAPSULE ORAL 2 TIMES DAILY
COMMUNITY

## 2023-11-08 RX ORDER — POLYETHYLENE GLYCOL 3350 17 G/17G
17 POWDER, FOR SOLUTION ORAL DAILY PRN
Status: DISCONTINUED | OUTPATIENT
Start: 2023-11-08 | End: 2023-11-11 | Stop reason: HOSPADM

## 2023-11-08 RX ORDER — MAGNESIUM SULFATE IN WATER 40 MG/ML
2000 INJECTION, SOLUTION INTRAVENOUS ONCE
Status: COMPLETED | OUTPATIENT
Start: 2023-11-08 | End: 2023-11-08

## 2023-11-08 RX ORDER — DICYCLOMINE HCL 20 MG
20 TABLET ORAL EVERY 6 HOURS
Status: DISCONTINUED | OUTPATIENT
Start: 2023-11-08 | End: 2023-11-11 | Stop reason: HOSPADM

## 2023-11-08 RX ORDER — FENTANYL CITRATE 50 UG/ML
50 INJECTION, SOLUTION INTRAMUSCULAR; INTRAVENOUS
Status: DISCONTINUED | OUTPATIENT
Start: 2023-11-08 | End: 2023-11-09

## 2023-11-08 RX ORDER — LISINOPRIL 20 MG/1
20 TABLET ORAL 2 TIMES DAILY
Status: DISCONTINUED | OUTPATIENT
Start: 2023-11-08 | End: 2023-11-11 | Stop reason: HOSPADM

## 2023-11-08 RX ORDER — ATORVASTATIN CALCIUM 10 MG/1
10 TABLET, FILM COATED ORAL DAILY
Status: DISCONTINUED | OUTPATIENT
Start: 2023-11-08 | End: 2023-11-11 | Stop reason: HOSPADM

## 2023-11-08 RX ORDER — LISINOPRIL 20 MG/1
20 TABLET ORAL ONCE
Status: COMPLETED | OUTPATIENT
Start: 2023-11-08 | End: 2023-11-08

## 2023-11-08 RX ORDER — LISINOPRIL 20 MG/1
20 TABLET ORAL 2 TIMES DAILY
Status: DISCONTINUED | OUTPATIENT
Start: 2023-11-08 | End: 2023-11-08

## 2023-11-08 RX ORDER — ESTRADIOL 1 MG/1
1 TABLET ORAL DAILY
Status: DISCONTINUED | OUTPATIENT
Start: 2023-11-09 | End: 2023-11-11 | Stop reason: HOSPADM

## 2023-11-08 RX ORDER — LISINOPRIL 5 MG/1
10 TABLET ORAL DAILY
Status: DISCONTINUED | OUTPATIENT
Start: 2023-11-09 | End: 2023-11-08

## 2023-11-08 RX ORDER — NITROGLYCERIN 0.4 MG/1
0.4 TABLET SUBLINGUAL EVERY 5 MIN PRN
Status: DISCONTINUED | OUTPATIENT
Start: 2023-11-08 | End: 2023-11-09

## 2023-11-08 RX ORDER — TRIAMTERENE AND HYDROCHLOROTHIAZIDE 37.5; 25 MG/1; MG/1
1 TABLET ORAL DAILY PRN
Status: DISCONTINUED | OUTPATIENT
Start: 2023-11-09 | End: 2023-11-11 | Stop reason: HOSPADM

## 2023-11-08 RX ORDER — ONDANSETRON 2 MG/ML
4 INJECTION INTRAMUSCULAR; INTRAVENOUS EVERY 6 HOURS PRN
Status: DISCONTINUED | OUTPATIENT
Start: 2023-11-08 | End: 2023-11-11 | Stop reason: HOSPADM

## 2023-11-08 RX ADMIN — TRIAMTERENE AND HYDROCHLOROTHIAZIDE 1 TABLET: 37.5; 25 TABLET ORAL at 15:13

## 2023-11-08 RX ADMIN — HYDRALAZINE HYDROCHLORIDE 10 MG: 20 INJECTION INTRAMUSCULAR; INTRAVENOUS at 23:20

## 2023-11-08 RX ADMIN — FENTANYL CITRATE 50 MCG: 50 INJECTION INTRAMUSCULAR; INTRAVENOUS at 12:26

## 2023-11-08 RX ADMIN — HYDRALAZINE HYDROCHLORIDE 10 MG: 20 INJECTION INTRAMUSCULAR; INTRAVENOUS at 14:39

## 2023-11-08 RX ADMIN — OXYBUTYNIN CHLORIDE 5 MG: 5 TABLET, EXTENDED RELEASE ORAL at 19:46

## 2023-11-08 RX ADMIN — ACETAMINOPHEN 1000 MG: 500 TABLET ORAL at 13:58

## 2023-11-08 RX ADMIN — SODIUM CHLORIDE, PRESERVATIVE FREE 10 ML: 5 INJECTION INTRAVENOUS at 19:46

## 2023-11-08 RX ADMIN — DICYCLOMINE HYDROCHLORIDE 20 MG: 20 TABLET ORAL at 19:49

## 2023-11-08 RX ADMIN — LISINOPRIL 20 MG: 20 TABLET ORAL at 19:46

## 2023-11-08 RX ADMIN — FAMOTIDINE 40 MG: 20 TABLET ORAL at 19:46

## 2023-11-08 RX ADMIN — ACETAMINOPHEN 650 MG: 325 TABLET ORAL at 19:46

## 2023-11-08 RX ADMIN — LISINOPRIL 20 MG: 20 TABLET ORAL at 14:39

## 2023-11-08 RX ADMIN — MAGNESIUM SULFATE HEPTAHYDRATE 2000 MG: 40 INJECTION, SOLUTION INTRAVENOUS at 12:29

## 2023-11-08 RX ADMIN — CLONAZEPAM 0.5 MG: 0.5 TABLET ORAL at 19:46

## 2023-11-08 ASSESSMENT — ENCOUNTER SYMPTOMS
BACK PAIN: 0
DIARRHEA: 0
WHEEZING: 0
VOMITING: 0
EYE PAIN: 0
ABDOMINAL PAIN: 0
BLOOD IN STOOL: 0
CONSTIPATION: 0
COUGH: 0
CHEST TIGHTNESS: 0
SHORTNESS OF BREATH: 0
NAUSEA: 0
PHOTOPHOBIA: 0
COLOR CHANGE: 0

## 2023-11-08 ASSESSMENT — PAIN SCALES - GENERAL
PAINLEVEL_OUTOF10: 3
PAINLEVEL_OUTOF10: 4
PAINLEVEL_OUTOF10: 3
PAINLEVEL_OUTOF10: 5
PAINLEVEL_OUTOF10: 3

## 2023-11-08 ASSESSMENT — PATIENT HEALTH QUESTIONNAIRE - PHQ9
1. LITTLE INTEREST OR PLEASURE IN DOING THINGS: 0
SUM OF ALL RESPONSES TO PHQ QUESTIONS 1-9: 0
SUM OF ALL RESPONSES TO PHQ9 QUESTIONS 1 & 2: 0
SUM OF ALL RESPONSES TO PHQ QUESTIONS 1-9: 0
2. FEELING DOWN, DEPRESSED OR HOPELESS: 0
SUM OF ALL RESPONSES TO PHQ QUESTIONS 1-9: 0
SUM OF ALL RESPONSES TO PHQ QUESTIONS 1-9: 0

## 2023-11-08 ASSESSMENT — PAIN DESCRIPTION - LOCATION
LOCATION: HEAD

## 2023-11-08 NOTE — CONSULTS
Electrophysiology Consult Note        Reason for consultation:  Pause on event monitor     Chief complaint :  Episodes of dizziness and near syncope     Referring physician: Alda Driscoll        Primary care physician: Sotero Alicea MD        History of Present Illness:      Jovanny Segal is a 70year old female with a history of anxiety, CAD s/p PCI, HTN, HLD, bipolar presents with concerns of pause on event monitor. She reports that at time of pause she had a headache. She denies dizziness or syncope. Per patient she recently saw Dr Mirian Knott in the office with complaints of intermittent episodes of diaphoresis and dizziness. She states that this has been intermittent for a long time. She states that in June she did have a syncopal episode. She was dizzy prior to the syncope. She states she woke up on the ground. She became alert and oriented once she fell to the ground. This even was unwitnessed  She reports that she will have dizziness with ambulation but it will resolve when she sits down. She has been experiencing headaches due to medications. This medication has been discontinued and her headaches are improving. She denies chest pain, palpitations, shortness of breath or edema. She reports that she take lisinopril and toprol xl for blood pressure control. She takes her mediations as prescribed. On admission , K 3.8, Creatinine 0.9, Mag 1.7,   She is scheduled to have a stress test today  She reports she had a tilt table in the past and it was normal. She did not pass out. Pastmedical history:   Past Medical History        Past Medical History:   Diagnosis Date    Anxiety      CAD (coronary artery disease)      Chest pain      Chronic back pain      Depression      Essential hypertension 04/02/2021    GERD (gastroesophageal reflux disease)      H/O cardiovascular stress test       7/26/12-No scintigraphic evidence of inducible myocardial ischemia.  Global Left ventricular Anxiety Disorder Brother      Cancer Maternal Aunt      Cancer Paternal Aunt      Diabetes Brother      High Blood Pressure Brother      Arthritis Mother           polymalgia rheumatica    Heart Disease Mother      Diabetes Mother      Heart Disease Father      Diabetes Father      High Blood Pressure Father      Diabetes Maternal Grandmother      Heart Disease Maternal Grandmother      Anxiety Disorder Maternal Grandmother      No Known Problems Maternal Grandfather      Heart Disease Paternal Grandmother      Diabetes Paternal Grandmother      Other Paternal Grandfather           ended up in a wheelchair - cause unknown            Social history :  reports that she quit smoking about 3 years ago. Her smoking use included cigarettes. She has a 10.00 pack-year smoking history. She has never used smokeless tobacco. She reports current alcohol use. She reports that she does not currently use drugs after having used the following drugs: Marijuana Polly Rival). Allergies   Allergen Reactions    Pregabalin Shortness Of Breath and Other (See Comments)       Severe abdominal pain    Hydromorphone Hcl Itching    Norco [Hydrocodone-Acetaminophen] Itching    Aspirin Rash       Needs to enteric coating       Codeine Itching    Diatrizoate Rash    Gabapentin Nausea And Vomiting    Ibuprofen Rash    Iodine Rash         No current facility-administered medications on file prior to encounter. Current Outpatient Medications on File Prior to Encounter   Medication Sig Dispense Refill    Calcium Carb-Cholecalciferol (CALCIUM + D3 PO) Take 1 tablet by mouth 2 times daily        estradiol (ESTRACE) 1 MG tablet Take 1 tablet by mouth daily        clonazePAM (KLONOPIN) 0.5 MG tablet Take 1 tablet by mouth 2 times daily as needed.         oxybutynin (DITROPAN-XL) 5 MG extended release tablet Take 1 tablet by mouth daily        citalopram (CELEXA) 20 MG tablet Take 1 tablet by mouth daily        Multiple Vitamins-Minerals

## 2023-11-08 NOTE — ED PROVIDER NOTES
12 lead EKG per my interpretation:  Normal Sinus Rhythm 61  Axis is   Normal  QTc is   434  There is no specific T wave changes appreciated. There is no specific ST wave changes appreciated.     Prior EKG to compare with was not available         Ines Elizondo DO  11/08/23 [id number]

## 2023-11-08 NOTE — ED NOTES
Medication History  [facility]    Patient Name: Walter Galvez 1955     Medication history has been completed by: Geneva Mchugh CPhT    Source(s) of information: patient and insurance claims     Primary Care Physician: Jaylan Clemons MD     Pharmacy: [facility]    Allergies as of 11/08/2023 - Fully Reviewed 11/08/2023   Allergen Reaction Noted    Pregabalin Shortness Of Breath and Other (See Comments) 08/21/2013    Hydromorphone hcl Itching 03/24/2017    Norco [hydrocodone-acetaminophen] Itching 05/11/2023    Aspirin Rash     Codeine Itching 08/13/2012    Diatrizoate Rash 04/19/2010    Gabapentin Nausea And Vomiting 01/13/2015    Ibuprofen Rash 08/13/2012    Iodine Rash 08/13/2012        Prior to Admission medications    Medication Sig Start Date End Date Taking? Authorizing Provider   Calcium Carb-Cholecalciferol (CALCIUM + D3 PO) Take 1 tablet by mouth 2 times daily   Yes So Mcdaniel MD   estradiol (ESTRACE) 1 MG tablet Take 1 tablet by mouth daily 10/8/23   So Mcdaniel MD   clonazePAM (KLONOPIN) 0.5 MG tablet Take 1 tablet by mouth 2 times daily as needed.  9/26/23   So Mcdaniel MD   oxybutynin (DITROPAN-XL) 5 MG extended release tablet Take 1 tablet by mouth daily 10/2/23   So Mcdaniel MD   citalopram (CELEXA) 20 MG tablet Take 1 tablet by mouth daily 10/19/23   So Mcdaniel MD   Multiple Vitamins-Minerals (THERAPEUTIC MULTIVITAMIN-MINERALS) tablet Take 1 tablet by mouth daily Women over 50    So Mcdaniel MD   vitamin E 1000 units capsule Take 1 capsule by mouth daily    So Mcdaniel MD   Cholecalciferol (VITAMIN D3 PO) Take 1 capsule by mouth daily    So Mcdaniel MD   Multiple Vitamins-Minerals (THERAPEUTIC MULTIVITAMIN-MINERALS) tablet Take 1 tablet by mouth daily Hair, Skin and Nails    So Mcdaniel MD   acetaminophen (TYLENOL) 325 MG tablet Take 2 tablets by mouth every 6 hours as needed

## 2023-11-08 NOTE — H&P
THE LUNGS DAILY AS NEEDED 11/10/17   So Mcdaniel MD   pantoprazole (PROTONIX) 20 MG tablet Take 1 tablet by mouth daily 1/18/18   So Mcdaniel MD   dicyclomine (BENTYL) 20 MG tablet Take 1 tablet by mouth every 6 hours 3/7/16   So Mcdaniel MD   loratadine (CLARITIN) 10 MG tablet Take 1 tablet by mouth daily    So Mcdaniel MD   aspirin EC 81 MG EC tablet Take 1 tablet by mouth daily. Patient taking differently: Take 1 tablet by mouth daily Coated aspirin 8/24/13   Laurie Sykes MD       Physical Exam:    Physical Exam     General: NAD  Eyes: EOMI  ENT: neck supple  Cardiovascular: Regular rate and rhythm, no murmurs, gallops, lifts or heaves  Respiratory: Clear to auscultation bilaterally, no wheezes, rhonchi or rales  Gastrointestinal: Soft, non tender, BS x 4  Genitourinary: no suprapubic tenderness  Musculoskeletal: trace bilateral LE edema  Skin: warm, dry  Neuro: Alert & oriented x 3  Psych: Mood appropriate. Past Medical History:   PMHx   Past Medical History:   Diagnosis Date    Anxiety     CAD (coronary artery disease)     Chest pain     Chronic back pain     Depression     Essential hypertension 04/02/2021    GERD (gastroesophageal reflux disease)     H/O cardiovascular stress test     7/26/12-No scintigraphic evidence of inducible myocardial ischemia. Global Left ventricular sytolic function normal. Rest EF 64%. No ECG changes. Unremarkable pharmacological stress test. Normal Myocardial Perfusion Study. 3/30/11- EF70% Normal Myocardial Perfusion study. 2/18/2010 EF =>55%;1/2009 EF 65%, 1/2008, 1/2007, 5/2006, 2/2006 EF70%    H/O Doppler ultrasound 03/03/2008     Carotid Report- No Significant atherosclerotic plaque noted in the right internal carotid artery. The Doppler flow velocities w/in FREDERIC are within normal limits. There is intimal thinckening but no significant atherosclerotic plaque noted in LICA.  The Dopple flow velocities w/in LICA are Within Coronary angioplasty with stent (8/23/13); Cardiac catheterization; Tonsillectomy and adenoidectomy; Cardiac catheterization (03/11/2014); hernia repair (03/08/2017); eye surgery (Bilateral); Hysterectomy, total abdominal; and Abdomen surgery (N/A, 5/9/2023). Allergies: Allergies   Allergen Reactions    Pregabalin Shortness Of Breath and Other (See Comments)     Severe abdominal pain    Hydromorphone Hcl Itching    Norco [Hydrocodone-Acetaminophen] Itching    Aspirin Rash     Needs to enteric coating      Codeine Itching    Diatrizoate Rash    Gabapentin Nausea And Vomiting    Ibuprofen Rash    Iodine Rash     Fam HX:  family history includes Anxiety Disorder in her brother and maternal grandmother; Arthritis in her mother; Cancer in her maternal aunt and paternal aunt; Depression in her brother; Diabetes in her brother, brother, father, maternal grandmother, mother, paternal grandmother, and sister; Heart Disease in her brother, father, maternal grandmother, mother, paternal grandmother, and sister; High Blood Pressure in her brother, brother, father, and sister; No Known Problems in her maternal grandfather; Other in her paternal grandfather. Soc HX:   Social History     Socioeconomic History    Marital status: Single     Spouse name: None    Number of children: None    Years of education: None    Highest education level: None   Tobacco Use    Smoking status: Former     Packs/day: 0.25     Years: 40.00     Additional pack years: 0.00     Total pack years: 10.00     Types: Cigarettes     Quit date: 3/1/2020     Years since quitting: 3.6    Smokeless tobacco: Never   Vaping Use    Vaping Use: Never used   Substance and Sexual Activity    Alcohol use: Yes     Comment: 5 x a year.  Caffeine: pepsi (sometimes), decaf tea     Drug use: Not Currently     Types: Marijuana Oxana Banister)     Comment: edibles    Sexual activity: Not Currently       Medications:   Medications:    aspirin  81 mg Oral Daily    calcium

## 2023-11-08 NOTE — CARE COORDINATION
[facility] [place] reviewed at this time, criteria supports Inpatient Admission.  DARLYN,RN/CM

## 2023-11-08 NOTE — ED NOTES
ED TO INPATIENT SBAR HANDOFF    Patient Name: Isiah Summers   :    70 y.o. Preferred Name    Family/Caregiver Present yes   Restraints no   C-SSRS: Risk of Suicide: No Risk  Sitter no   Sepsis Risk Score Sepsis Risk Score: 0.64      Situation  Chief Complaint   Patient presents with    Ambulatory Cardiac Monitoring     Pt has a cardiac monitor and was told she had a pause last night and was told to come in to be seen. Cardiology would like to be paged. Brief Description of Patient's Condition: Pt arrived to ED via walk in. Pt wearing a heart monitor and cardiology told her to come in because they recorded some pauses. Pt was seen by René Whitt in ED and ultimately decided pt could go home as the pauses were not convincing enough and they could have been artifact. Pt was taken for a stress test and was unable to complete it d/t a severe headache and BP in the 200's. Joana Flower PA-C saw her again and decided the pt needed to stay. Admit for BP control and testing. Pt given lisinopril, hydralazine, and maxzide in ed.   Mental Status: oriented, alert, coherent, logical, thought processes intact, and able to concentrate and follow conversation  Arrived from: home    Imaging:   No orders to display     Abnormal labs:   Abnormal Labs Reviewed   CBC WITH AUTO DIFFERENTIAL - Abnormal; Notable for the following components:       Result Value    RBC 3.80 (*)     Hemoglobin 11.7 (*)     Hematocrit 35.6 (*)     Lymphocytes % 21.8 (*)     Monocytes % 11.9 (*)     All other components within normal limits   COMPREHENSIVE METABOLIC PANEL - Abnormal; Notable for the following components:    ALT 8 (*)     All other components within normal limits   MAGNESIUM - Abnormal; Notable for the following components:    Magnesium 1.7 (*)     All other components within normal limits       Background  History:   Past Medical History:   Diagnosis Date    Anxiety     CAD (coronary artery disease)     Chest pain

## 2023-11-08 NOTE — PROGRESS NOTES
Attempting to perform exercise treadmill. Patient is complaining of an intense headache and her systolic blood pressure 676D. She does not believe she can exercise at this time. Will cancel treadmill for time being.      Javier Villela PA-C 11/08/23 2:14 PM

## 2023-11-08 NOTE — TELEPHONE ENCOUNTER
Per Dr. Isma Hung direct order I called patient and advised her to go to ED for pause on event monitor. I explained to patient that she was having slow heart rates and that this needed to be addressed by EP. Patient agreed to go.

## 2023-11-08 NOTE — CONSULTS
CARDIOLOGY CONSULT NOTE   Reason for consultation:  4.9 sec pause    Referring physician:  No admitting provider for patient encounter. Primary care physician: Yu Boss MD      Dear   Thanks for the consult. History of present illness:Toña is a 70 y. o.year old who is sent from [facility] for adverse event reported on continuous cardiac monitor suggesting 4.9-second pause last night at 10:51 PM, patient was watching TV and did not have any syncope at that time, her initial reason for cardiac monitoring was syncope which happened couple of months ago she denies any chest pain, she is on metoprolol XL 25 mg which is discontinued today shortness of breath or dizziness  Chief Complaint   Patient presents with    Ambulatory Cardiac Monitoring     Pt has a cardiac monitor and was told she had a pause last night and was told to come in to be seen. Cardiology would like to be paged. Blood pressure, cholesterol, blood glucose and weight are well controlled. Past medical history:    has a past medical history of Anxiety, CAD (coronary artery disease), Chest pain, Chronic back pain, Depression, Essential hypertension, GERD (gastroesophageal reflux disease), H/O cardiovascular stress test, H/O Doppler ultrasound, H/O echocardiogram, H/O echocardiogram, H/O exercise stress test, H/O percutaneous left heart catheterization, H/O tilt table evaluation, History of transesophageal echocardiography (MEHUL), Hx of cardiovascular stress test, Hx of cardiovascular stress test, Hx of cardiovascular stress test, Hx of echocardiogram, Hyperlipidemia, Irritable bowel syndrome, Orthostatic hypotension, Other activity(E029.9), Post PTCA, Post PTCA, Raynaud's disease, and S/P transesophageal echocardiogram (MEHUL). Past surgical history:   has a past surgical history that includes Hysterectomy; Ulnar tunnel release (11/2006); Wrist surgery (7/2008); Coronary angioplasty with stent (8/23/13);  Cardiac catheterization; Tonsillectomy and adenoidectomy; Cardiac catheterization (03/11/2014); hernia repair (03/08/2017); eye surgery (Bilateral); Hysterectomy, total abdominal; and Abdomen surgery (N/A, 5/9/2023). Social History:   reports that she quit smoking about 3 years ago. Her smoking use included cigarettes. She has a 10.00 pack-year smoking history. She has never used smokeless tobacco. She reports current alcohol use. She reports that she does not currently use drugs after having used the following drugs: Marijuana Johny Falk). Family history:   no family history of CAD, STROKE of DM    Allergies   Allergen Reactions    Pregabalin Shortness Of Breath and Other (See Comments)     Severe abdominal pain    Hydromorphone Hcl Itching    Norco [Hydrocodone-Acetaminophen] Itching    Aspirin Rash     Needs to enteric coating      Codeine Itching    Diatrizoate Rash    Gabapentin Nausea And Vomiting    Ibuprofen Rash    Iodine Rash       No current facility-administered medications for this encounter. No current facility-administered medications for this encounter. Current Outpatient Medications   Medication Sig Dispense Refill    glycopyrrolate (ROBINUL) 1 MG tablet Take 1 tablet by mouth daily (Patient not taking: Reported on 11/8/2023)      estradiol (ESTRACE) 1 MG tablet Take 1 tablet by mouth daily      clonazePAM (KLONOPIN) 0.5 MG tablet TAKE 1 TABLET TWICE A DAY BY ORAL ROUTE AS NEEDED. oxybutynin (DITROPAN-XL) 5 MG extended release tablet Take 1 tablet by mouth daily      citalopram (CELEXA) 20 MG tablet       cyclobenzaprine (FLEXERIL) 10 MG tablet Take 1 tablet by mouth 3 times daily as needed for Muscle spasms      traMADol (ULTRAM) 50 MG tablet Take 1 tablet by mouth every 6 hours as needed for Pain.       Multiple Vitamins-Minerals (THERAPEUTIC MULTIVITAMIN-MINERALS) tablet Take 1 tablet by mouth daily Women over 50      vitamin E 1000 units capsule Take 1 capsule by mouth daily      Cholecalciferol (VITAMIN D3)

## 2023-11-08 NOTE — H&P
Electrophysiology Consult Note      Reason for consultation:  Pause on event monitor    Chief complaint :  Episodes of dizziness and near syncope    Referring physician: Joselyn Appiah      Primary care physician: Chalino Hancock MD      History of Present Illness:     Stormy Tellez is a 70year old female with a history of anxiety, CAD s/p PCI, HTN, HLD, bipolar presents with concerns of pause on event monitor. She reports that at time of pause she had a headache. She denies dizziness or syncope. Per patient she recently saw Dr Daisy Fields in the office with complaints of intermittent episodes of diaphoresis and dizziness. She states that this has been intermittent for a long time. She states that in June she did have a syncopal episode. She was dizzy prior to the syncope. She states she woke up on the ground. She became alert and oriented once she fell to the ground. This even was unwitnessed  She reports that she will have dizziness with ambulation but it will resolve when she sits down. She has been experiencing headaches due to medications. This medication has been discontinued and her headaches are improving. She denies chest pain, palpitations, shortness of breath or edema. She reports that she take lisinopril and toprol xl for blood pressure control. She takes her mediations as prescribed. On admission , K 3.8, Creatinine 0.9, Mag 1.7,   She is scheduled to have a stress test today  She reports she had a tilt table in the past and it was normal. She did not pass out. Pastmedical history:   Past Medical History:   Diagnosis Date    Anxiety     CAD (coronary artery disease)     Chest pain     Chronic back pain     Depression     Essential hypertension 04/02/2021    GERD (gastroesophageal reflux disease)     H/O cardiovascular stress test     7/26/12-No scintigraphic evidence of inducible myocardial ischemia.  Global Left ventricular sytolic function normal. Rest EF 64%. No ECG changes. Unremarkable pharmacological stress test. Normal Myocardial Perfusion Study. 3/30/11- EF70% Normal Myocardial Perfusion study. 2/18/2010 EF =>55%;1/2009 EF 65%, 1/2008, 1/2007, 5/2006, 2/2006 EF70%    H/O Doppler ultrasound 03/03/2008     Carotid Report- No Significant atherosclerotic plaque noted in the right internal carotid artery. The Doppler flow velocities w/in FREDERIC are within normal limits. There is intimal thinckening but no significant atherosclerotic plaque noted in LICA. The Dopple flow velocities w/in LICA are Within Normal Limits. H/O echocardiogram     7/26/12- EF=>55%. Left ventricular systolic function is IRSIAC.0/3/4774 EF =>55%, 12/2010 EF55%;  2/14/2008    H/O echocardiogram 07/17/2017    EF >55%    H/O exercise stress test 06/22/2018    treadmill    H/O percutaneous left heart catheterization 03/11/2014    EF 55% Left anterior descending artery has patent stent, proximal LAD has eccentric lesion of 30-40% unchanged from last time, The right coronary artery is a dominant vessel with abberant take off, has 40-50% mid RCA stenosis, proximal stent is patent. No evidence of hemodynamically significant coronary artery disease, I have tried to do FFR of RCA, however, because of  aberrant take off, could no    H/O tilt table evaluation 08/18/2021    Normal response. History of transesophageal echocardiography (MEHUL) 05/17/2022    Appears to be slight prolapse to the anterior mitral valve leaflet. Hx of cardiovascular stress test 05/14/2015    cardiolite-normal,EF63%    Hx of cardiovascular stress test 03/10/2020    Normal study    Hx of cardiovascular stress test 11/16/2021    Normal study    Hx of echocardiogram 03/15/2016    EF 55%, normal LV, trivial MR & TR, mild aortic insufficiency.      Hyperlipidemia     Irritable bowel syndrome     Orthostatic hypotension     Other activity(E029.9) 09/02/2008    48 HR Holter Monitor- Predominat

## 2023-11-08 NOTE — ED NOTES
1113 paged Elias Ramírez  11/08/23 1114    1151 repaged Dr Lisa Lemus  11/08/23 1153    1200 Dr Abigail Moyer in ED     Denis Vincent  11/08/23 123

## 2023-11-09 ENCOUNTER — HOSPITAL ENCOUNTER (OUTPATIENT)
Age: 68
Setting detail: OBSERVATION
Discharge: HOME | End: 2023-11-11

## 2023-11-09 ENCOUNTER — APPOINTMENT (OUTPATIENT)
Dept: NON INVASIVE DIAGNOSTICS | Age: 68
End: 2023-11-09
Payer: MEDICARE

## 2023-11-09 PROBLEM — G43.901 STATUS MIGRAINOSUS: Status: ACTIVE | Noted: 2023-11-09

## 2023-11-09 LAB
ANION GAP SERPL CALCULATED.3IONS-SCNC: 11 MMOL/L (ref 4–16)
BUN SERPL-MCNC: 11 MG/DL (ref 6–23)
CALCIUM SERPL-MCNC: 9.2 MG/DL (ref 8.3–10.6)
CHLORIDE BLD-SCNC: 103 MMOL/L (ref 99–110)
CO2: 25 MMOL/L (ref 21–32)
CREAT SERPL-MCNC: 0.8 MG/DL (ref 0.6–1.1)
ECHO BSA: 2.03 M2
EKG ATRIAL RATE: 61 BPM
EKG DIAGNOSIS: NORMAL
EKG P AXIS: 62 DEGREES
EKG P-R INTERVAL: 134 MS
EKG Q-T INTERVAL: 432 MS
EKG QRS DURATION: 76 MS
EKG QTC CALCULATION (BAZETT): 434 MS
EKG R AXIS: 9 DEGREES
EKG T AXIS: 22 DEGREES
EKG VENTRICULAR RATE: 61 BPM
GFR SERPL CREATININE-BSD FRML MDRD: >60 ML/MIN/1.73M2
GLUCOSE SERPL-MCNC: 76 MG/DL (ref 70–99)
HCT VFR BLD CALC: 34.9 % (ref 37–47)
HEMOGLOBIN: 11.3 GM/DL (ref 12.5–16)
MAGNESIUM: 1.9 MG/DL (ref 1.8–2.4)
MCH RBC QN AUTO: 30.4 PG (ref 27–31)
MCHC RBC AUTO-ENTMCNC: 32.4 % (ref 32–36)
MCV RBC AUTO: 93.8 FL (ref 78–100)
PDW BLD-RTO: 15 % (ref 11.7–14.9)
PLATELET # BLD: 251 K/CU MM (ref 140–440)
PMV BLD AUTO: 8.8 FL (ref 7.5–11.1)
POTASSIUM SERPL-SCNC: 4.3 MMOL/L (ref 3.5–5.1)
RBC # BLD: 3.72 M/CU MM (ref 4.2–5.4)
SODIUM BLD-SCNC: 139 MMOL/L (ref 135–145)
STRESS BASELINE HR: 113 BPM
STRESS ESTIMATED WORKLOAD: 4.6 METS
STRESS EXERCISE DUR MIN: 2 MIN
STRESS EXERCISE DUR SEC: 59 SEC
STRESS PEAK DIAS BP: 83 MMHG
STRESS PEAK SYS BP: 188 MMHG
STRESS PERCENT HR ACHIEVED: 109 %
STRESS POST PEAK HR: 164 BPM
STRESS RATE PRESSURE PRODUCT: NORMAL BPM*MMHG
STRESS TARGET HR: 151 BPM
WBC # BLD: 5.2 K/CU MM (ref 4–10.5)

## 2023-11-09 PROCEDURE — 94761 N-INVAS EAR/PLS OXIMETRY MLT: CPT

## 2023-11-09 PROCEDURE — 85025 COMPLETE CBC W/AUTO DIFF WBC: CPT

## 2023-11-09 PROCEDURE — 6360000002 HC RX W HCPCS: Performed by: NURSE PRACTITIONER

## 2023-11-09 PROCEDURE — 83735 ASSAY OF MAGNESIUM: CPT

## 2023-11-09 PROCEDURE — 2580000003 HC RX 258: Performed by: NURSE PRACTITIONER

## 2023-11-09 PROCEDURE — G0378 HOSPITAL OBSERVATION PER HR: HCPCS

## 2023-11-09 PROCEDURE — 85027 COMPLETE CBC AUTOMATED: CPT

## 2023-11-09 PROCEDURE — 36415 COLL VENOUS BLD VENIPUNCTURE: CPT

## 2023-11-09 PROCEDURE — 2500000003 HC RX 250 WO HCPCS: Performed by: CLINICAL NURSE SPECIALIST

## 2023-11-09 PROCEDURE — 96367 TX/PROPH/DG ADDL SEQ IV INF: CPT

## 2023-11-09 PROCEDURE — 80048 BASIC METABOLIC PNL TOTAL CA: CPT

## 2023-11-09 PROCEDURE — 93018 CV STRESS TEST I&R ONLY: CPT | Performed by: INTERNAL MEDICINE

## 2023-11-09 PROCEDURE — 6360000002 HC RX W HCPCS: Performed by: CLINICAL NURSE SPECIALIST

## 2023-11-09 PROCEDURE — 2580000003 HC RX 258: Performed by: CLINICAL NURSE SPECIALIST

## 2023-11-09 PROCEDURE — 96375 TX/PRO/DX INJ NEW DRUG ADDON: CPT

## 2023-11-09 PROCEDURE — 93016 CV STRESS TEST SUPVJ ONLY: CPT | Performed by: INTERNAL MEDICINE

## 2023-11-09 PROCEDURE — 99205 OFFICE O/P NEW HI 60 MIN: CPT | Performed by: STUDENT IN AN ORGANIZED HEALTH CARE EDUCATION/TRAINING PROGRAM

## 2023-11-09 PROCEDURE — 96366 THER/PROPH/DIAG IV INF ADDON: CPT

## 2023-11-09 PROCEDURE — 6370000000 HC RX 637 (ALT 250 FOR IP): Performed by: NURSE PRACTITIONER

## 2023-11-09 PROCEDURE — 96376 TX/PRO/DX INJ SAME DRUG ADON: CPT

## 2023-11-09 PROCEDURE — 96372 THER/PROPH/DIAG INJ SC/IM: CPT

## 2023-11-09 PROCEDURE — 93017 CV STRESS TEST TRACING ONLY: CPT

## 2023-11-09 PROCEDURE — 99232 SBSQ HOSP IP/OBS MODERATE 35: CPT | Performed by: INTERNAL MEDICINE

## 2023-11-09 PROCEDURE — 93010 ELECTROCARDIOGRAM REPORT: CPT | Performed by: INTERNAL MEDICINE

## 2023-11-09 RX ORDER — FENTANYL CITRATE 50 UG/ML
100 INJECTION, SOLUTION INTRAMUSCULAR; INTRAVENOUS
Status: DISCONTINUED | OUTPATIENT
Start: 2023-11-09 | End: 2023-11-09

## 2023-11-09 RX ORDER — DEXAMETHASONE SODIUM PHOSPHATE 4 MG/ML
4 INJECTION, SOLUTION INTRA-ARTICULAR; INTRALESIONAL; INTRAMUSCULAR; INTRAVENOUS; SOFT TISSUE EVERY 6 HOURS
Status: DISCONTINUED | OUTPATIENT
Start: 2023-11-09 | End: 2023-11-11 | Stop reason: HOSPADM

## 2023-11-09 RX ORDER — PROCHLORPERAZINE EDISYLATE 5 MG/ML
10 INJECTION INTRAMUSCULAR; INTRAVENOUS EVERY 6 HOURS PRN
Status: DISCONTINUED | OUTPATIENT
Start: 2023-11-09 | End: 2023-11-11 | Stop reason: HOSPADM

## 2023-11-09 RX ADMIN — ASPIRIN 81 MG: 81 TABLET, CHEWABLE ORAL at 09:59

## 2023-11-09 RX ADMIN — SODIUM CHLORIDE, PRESERVATIVE FREE 10 ML: 5 INJECTION INTRAVENOUS at 09:58

## 2023-11-09 RX ADMIN — CETIRIZINE HYDROCHLORIDE 10 MG: 10 TABLET, FILM COATED ORAL at 10:00

## 2023-11-09 RX ADMIN — CITALOPRAM HYDROBROMIDE 20 MG: 20 TABLET ORAL at 09:59

## 2023-11-09 RX ADMIN — FENTANYL CITRATE 50 MCG: 50 INJECTION INTRAMUSCULAR; INTRAVENOUS at 01:19

## 2023-11-09 RX ADMIN — SODIUM CHLORIDE: 9 INJECTION, SOLUTION INTRAVENOUS at 14:52

## 2023-11-09 RX ADMIN — LISINOPRIL 20 MG: 20 TABLET ORAL at 20:21

## 2023-11-09 RX ADMIN — Medication 1 TABLET: at 17:42

## 2023-11-09 RX ADMIN — PANTOPRAZOLE SODIUM 20 MG: 20 TABLET, DELAYED RELEASE ORAL at 10:00

## 2023-11-09 RX ADMIN — OXYBUTYNIN CHLORIDE 5 MG: 5 TABLET, EXTENDED RELEASE ORAL at 20:21

## 2023-11-09 RX ADMIN — DEXAMETHASONE SODIUM PHOSPHATE 4 MG: 4 INJECTION, SOLUTION INTRAMUSCULAR; INTRAVENOUS at 20:21

## 2023-11-09 RX ADMIN — FAMOTIDINE 40 MG: 20 TABLET ORAL at 20:21

## 2023-11-09 RX ADMIN — VALPROATE SODIUM 500 MG: 100 INJECTION, SOLUTION INTRAVENOUS at 20:30

## 2023-11-09 RX ADMIN — Medication 1 TABLET: at 10:04

## 2023-11-09 RX ADMIN — DICYCLOMINE HYDROCHLORIDE 20 MG: 20 TABLET ORAL at 10:00

## 2023-11-09 RX ADMIN — DEXAMETHASONE SODIUM PHOSPHATE 4 MG: 4 INJECTION, SOLUTION INTRAMUSCULAR; INTRAVENOUS at 14:47

## 2023-11-09 RX ADMIN — SODIUM CHLORIDE, PRESERVATIVE FREE 10 ML: 5 INJECTION INTRAVENOUS at 20:31

## 2023-11-09 RX ADMIN — DICYCLOMINE HYDROCHLORIDE 20 MG: 20 TABLET ORAL at 17:42

## 2023-11-09 RX ADMIN — ENOXAPARIN SODIUM 40 MG: 100 INJECTION SUBCUTANEOUS at 09:59

## 2023-11-09 RX ADMIN — DICYCLOMINE HYDROCHLORIDE 20 MG: 20 TABLET ORAL at 22:32

## 2023-11-09 RX ADMIN — ESTRADIOL 1 MG: 1 TABLET ORAL at 10:06

## 2023-11-09 RX ADMIN — VALPROATE SODIUM 500 MG: 100 INJECTION, SOLUTION INTRAVENOUS at 14:54

## 2023-11-09 ASSESSMENT — PAIN DESCRIPTION - ORIENTATION
ORIENTATION: ANTERIOR
ORIENTATION: ANTERIOR;MID;POSTERIOR
ORIENTATION: ANTERIOR;MID;POSTERIOR
ORIENTATION: OTHER (COMMENT)
ORIENTATION: ANTERIOR

## 2023-11-09 ASSESSMENT — PAIN DESCRIPTION - DESCRIPTORS
DESCRIPTORS: ACHING

## 2023-11-09 ASSESSMENT — PAIN SCALES - GENERAL
PAINLEVEL_OUTOF10: 0
PAINLEVEL_OUTOF10: 5
PAINLEVEL_OUTOF10: 2
PAINLEVEL_OUTOF10: 5
PAINLEVEL_OUTOF10: 5
PAINLEVEL_OUTOF10: 2
PAINLEVEL_OUTOF10: 5
PAINLEVEL_OUTOF10: 3
PAINLEVEL_OUTOF10: 7
PAINLEVEL_OUTOF10: 5

## 2023-11-09 ASSESSMENT — PAIN DESCRIPTION - LOCATION
LOCATION: HEAD

## 2023-11-09 ASSESSMENT — PAIN - FUNCTIONAL ASSESSMENT
PAIN_FUNCTIONAL_ASSESSMENT: ACTIVITIES ARE NOT PREVENTED
PAIN_FUNCTIONAL_ASSESSMENT: ACTIVITIES ARE NOT PREVENTED
PAIN_FUNCTIONAL_ASSESSMENT: PREVENTS OR INTERFERES SOME ACTIVE ACTIVITIES AND ADLS
PAIN_FUNCTIONAL_ASSESSMENT: PREVENTS OR INTERFERES SOME ACTIVE ACTIVITIES AND ADLS

## 2023-11-09 NOTE — PROGRESS NOTES
Today's plan:  Stress test shows normal chronotropic competence, no further cardiac work-up recommended, patient has headache recommend medical team evaluation and may need neurology consult      Admit Date:  11/8/2023    Subjective: Headache      Chief complaints on admission  Chief Complaint   Patient presents with    Ambulatory Cardiac Monitoring     Pt has a cardiac monitor and was told she had a pause last night and was told to come in to be seen. Cardiology would like to be paged. History of present illness:Toña is a 70 y. o.year old who  presents with had concerns including Ambulatory Cardiac Monitoring (Pt has a cardiac monitor and was told she had a pause last night and was told to come in to be seen. Cardiology would like to be paged. ). Past medical history:    has a past medical history of Anxiety, CAD (coronary artery disease), Chest pain, Chronic back pain, Depression, Essential hypertension, GERD (gastroesophageal reflux disease), H/O cardiovascular stress test, H/O Doppler ultrasound, H/O echocardiogram, H/O echocardiogram, H/O exercise stress test, H/O percutaneous left heart catheterization, H/O tilt table evaluation, History of transesophageal echocardiography (MEHUL), Hx of cardiovascular stress test, Hx of cardiovascular stress test, Hx of cardiovascular stress test, Hx of echocardiogram, Hyperlipidemia, Irritable bowel syndrome, Orthostatic hypotension, Other activity(E029.9), Post PTCA, Post PTCA, Raynaud's disease, and S/P transesophageal echocardiogram (MEHUL). Past surgical history:   has a past surgical history that includes Hysterectomy; Ulnar tunnel release (11/2006); Wrist surgery (7/2008); Coronary angioplasty with stent (8/23/13); Cardiac catheterization; Tonsillectomy and adenoidectomy; Cardiac catheterization (03/11/2014); hernia repair (03/08/2017); eye surgery (Bilateral); Hysterectomy, total abdominal; and Abdomen surgery (N/A, 5/9/2023).   Social History:   reports

## 2023-11-09 NOTE — CARE COORDINATION
11/09/23 0904   Service Assessment   Patient Orientation Alert and Oriented   Cognition Alert   History Provided By Medical Record   Primary Caregiver Self   Support Systems Parent; Family Members   Patient's [address] is: Legal Next of [address] [facility]   PCP Verified by CM Yes   Prior Functional Level Independent in ADLs/IADLs   Current Functional Level Independent in ADLs/IADLs   Can patient return to prior living arrangement Yes   Ability to make needs known: Good   Family able to assist with home care needs: Yes   Would you like for me to discuss the discharge plan with any other family members/significant others, and if so, who? No   Financial Resources Medicare;Medicaid   [employer] None     Chart reviewed, screened for discharge planning. Pt from home. No discharge needs identified at this time.

## 2023-11-09 NOTE — CONSULTS
lesions    NEUROLOGIC EXAM:    Mental Status: A&O to self, location, month and year, NAD, speech clear, language fluent, repetition and naming intact, follows commands appropriately    Cranial Nerve Exam:   CN II-XII: PERRL, VFF, no nystagmus, no gaze paresis, sensation V1-V3 intact b/l, muscles of facial expression symmetric; hearing intact to conversational tone, palate elevates symmetrically, shoulder elevation symmetric and tongue protrudes midline with movement side to side. Motor Exam:       Strength 5/5 UE's/LE's b/l  Tone and bulk normal   No pronator drift    Deep Tendon Reflexes: 1/4 biceps, triceps, brachioradialis, patellar, and achilles b/l; flexor plantar responses b/l    Sensation: Intact light touch/pinprick/vibration UE's/LE's b/l    Coordination/Cerebellum:       Tremors--none      Rapidly alternating movements: no dysdiadochokinesia b/l                Heel-to-Shin: no dysmetria b/l      Finger-to-Nose: no dysmetria b/l    Gait and stance:      Gait: deferred      LABS:     Recent Labs     11/08/23  1102 11/09/23  0618   WBC 5.5 5.2    139   K 3.8 4.3    103   CO2 25 25   BUN 9 11   CREATININE 0.9 0.8   GLUCOSE 90 76   INR 1.1  --    [email or web address]  [email or web address] TSH Results,[unfilled],     Last Liver Function Results:  @LABRCNTIP(ALT:3,AST:3,BILITOTAL:3,BILIDIR:3,ALKPHOS:3)@          IMAGING:    No pertinent imaging obtained      ASSESSMENT/PLAN:   This is a 71 y/o female with history of HTN, CAD, and HLD presenting with headache and elevated blood pressure. Symptoms persist this afternoon. She has sensitivity to light and sound. No nausea or vomiting. Intractable headache with migrainous features. She has been hypertensive which is likely exacerbating the headache. She has also been taking acetaminophen frequently which could be causing rebound effect as well.    Will obtain MRI of brain  Will treat with Depacon 500 mg IV every 8 hours and Decadron 4 mg IV every 6 hours while in hospital.   She has allergy to ASA and ibuprofen and therefore will refrain from using Toradol. Will add compazine as needed. Discussed goal is to get headache pain to a tolerable level and likelihood of full resolution of pain is low  She can follow up with us in the office after discharge if she continues to have issues with headaches   Further recommendations pending testing    > 80 minutes of time spent included chart review, obtaining history, patient examination, developing plan of care, and documentation. Thank you for allowing us to participate in the care of your patient. If there are any questions regarding evaluation please feel free to contact us. Pablo Perez, APRN - CNS, 11/9/2023     Attending Addendum:    I have discussed this patient with the mid-level provider. I have personally seen and examined the patient and reviewed the diagnostic testing and any changes in the history or physical exam are documented above. I agree with the plan of care as documented. I have evaluated/treated an acute illness/injury that poses threat to life or bodily function and/or Chronic illness with severe exacerbation, or treatment of side effect in this encounter. I have performed a substantive portion of this shared visit, with more than 50% of the time spent in face-to-face and in direct patient care, including obtaining critical elements of the history, performing a physical exam, reviewing laboratory and radiological data, medical decision-making, coordinating patient care, discussing the plan of care with the patient, and documentation. The above was discussed and reviewed with the CROW. Patient with status migrainosus. She appears uncomfortable in the room. Will initiate Depacon and decadron and obtain MRI brain given age and new onset refractory headache. Possible hypertension is contributing. Headache resolution does tend to lag behind improvement in BP.  We will

## 2023-11-09 NOTE — PROGRESS NOTES
4 Eyes Skin Assessment     NAME:  Victor Hugo Wilkerson  YOB: 1955  MEDICAL RECORD NUMBER:  [id number]    The patient is being assessed for  Admission    I agree that at least one RN has performed a thorough Head to Toe Skin Assessment on the patient. ALL assessment sites listed below have been assessed. Areas assessed by both nurses:    Head, Face, Ears, Shoulders, Back, Chest, Arms, Elbows, Hands, Sacrum. Buttock, Coccyx, Ischium, Legs. Feet and Heels, and Under Medical Devices         Does the Patient have a Wound?  No noted wound(s)       Manolo Prevention initiated by RN: No  Wound Care Orders initiated by RN: No    Pressure Injury (Stage 3,4, Unstageable, DTI, NWPT, and Complex wounds) if present, place Wound referral order by RN under : No    New Ostomies, if present place, Ostomy referral order under : No     Nurse 1 eSignature: Electronically signed by Britton Brizuela RN on 11/8/23 at 9:25 PM EST    **SHARE this note so that the co-signing nurse can place an eSignature**    Nurse 2 eSignature: Electronically signed by Juhi Gautam RN on 11/8/23 at 9:27 PM EST

## 2023-11-09 NOTE — PROGRESS NOTES
Oral Once      Infusions:    sodium chloride       PRN Meds: fentaNYL, 50 mcg, Q1H PRN  hydrALAZINE, 10 mg, Q6H PRN  clonazePAM, 0.5 mg, BID PRN  diphenhydrAMINE, 25 mg, Daily PRN  triamterene-hydroCHLOROthiazide, 1 tablet, Daily PRN  sodium chloride flush, 5-40 mL, PRN  sodium chloride, , PRN  potassium chloride, 40 mEq, PRN   Or  potassium alternative oral replacement, 40 mEq, PRN   Or  potassium chloride, 10 mEq, PRN  magnesium sulfate, 2,000 mg, PRN  ondansetron, 4 mg, Q8H PRN   Or  ondansetron, 4 mg, Q6H PRN  polyethylene glycol, 17 g, Daily PRN  acetaminophen, 650 mg, Q6H PRN   Or  acetaminophen, 650 mg, Q6H PRN  sennosides, 5 mL, BID PRN        Labs      Recent Results (from the past 24 hour(s))   CBC with Auto Differential    Collection Time: 11/08/23 11:02 AM   Result Value Ref Range    WBC 5.5 4.0 - 10.5 K/CU MM    RBC 3.80 (L) 4.2 - 5.4 M/CU MM    Hemoglobin 11.7 (L) 12.5 - 16.0 GM/DL    Hematocrit 35.6 (L) 37 - 47 %    MCV 93.7 78 - 100 FL    MCH 30.8 27 - 31 PG    MCHC 32.9 32.0 - 36.0 %    RDW 14.9 11.7 - 14.9 %    Platelets 052 030 - 138 K/CU MM    MPV 9.0 7.5 - 11.1 FL    Differential Type AUTOMATED DIFFERENTIAL     Segs Relative 63.7 36 - 66 %    Lymphocytes % 21.8 (L) 24 - 44 %    Monocytes % 11.9 (H) 0 - 4 %    Eosinophils % 2.0 0 - 3 %    Basophils % 0.4 0 - 1 %    Segs Absolute 3.5 K/CU MM    Lymphocytes Absolute 1.2 K/CU MM    Monocytes Absolute 0.7 K/CU MM    Eosinophils Absolute 0.1 K/CU MM    Basophils Absolute 0.0 K/CU MM    Nucleated RBC % 0.0 %    Total Nucleated RBC 0.0 K/CU MM    Total Immature Neutrophil 0.01 K/CU MM    Immature Neutrophil % 0.2 0 - 0.43 %   CMP    Collection Time: 11/08/23 11:02 AM   Result Value Ref Range    Sodium 138 135 - 145 MMOL/L    Potassium 3.8 3.5 - 5.1 MMOL/L    Chloride 104 99 - 110 mMol/L    CO2 25 21 - 32 MMOL/L    Anion Gap 9 4 - 16    Glucose 90 70 - 99 MG/DL    BUN 9 6 - 23 MG/DL    Creatinine 0.9 0.6 - 1.1 MG/DL    Est, Glom Filt Rate >60 >60 mL/min/1.73m2    Calcium 9.6 8.3 - 10.6 MG/DL    Total Protein 6.4 6.4 - 8.2 GM/DL    Albumin 3.7 3.4 - 5.0 GM/DL    Total Bilirubin 0.4 0.0 - 1.0 MG/DL    Alkaline Phosphatase 96 40 - 128 IU/L    ALT 8 (L) 10 - 40 U/L    AST 16 15 - 37 IU/L   Protime-INR    Collection Time: 11/08/23 11:02 AM   Result Value Ref Range    Protime 13.9 11.7 - 14.5 SECONDS    INR 1.1 INDEX   Magnesium    Collection Time: 11/08/23 11:02 AM   Result Value Ref Range    Magnesium 1.7 (L) 1.8 - 2.4 mg/dl   EKG 12 Lead    Collection Time: 11/08/23 12:43 PM   Result Value Ref Range    Ventricular Rate 61 BPM    Atrial Rate 61 BPM    P-R Interval 134 ms    QRS Duration 76 ms    Q-T Interval 432 ms    QTc Calculation (Bazett) 434 ms    P Axis 62 degrees    R Axis 9 degrees    T Axis 22 degrees    Diagnosis       Normal sinus rhythm  Nonspecific ST abnormality  Abnormal ECG  When compared with ECG of 10-FEB-2022 02:42,  No significant change was found  Confirmed by Stephany Devries MD, Trupti Yañez ([id number]) on 11/9/2023 6:43:49 AM     CBC    Collection Time: 11/09/23  6:18 AM   Result Value Ref Range    WBC 5.2 4.0 - 10.5 K/CU MM    RBC 3.72 (L) 4.2 - 5.4 M/CU MM    Hemoglobin 11.3 (L) 12.5 - 16.0 GM/DL    Hematocrit 34.9 (L) 37 - 47 %    MCV 93.8 78 - 100 FL    MCH 30.4 27 - 31 PG    MCHC 32.4 32.0 - 36.0 %    RDW 15.0 (H) 11.7 - 14.9 %    Platelets 629 610 - 921 K/CU MM    MPV 8.8 7.5 - 11.1 FL        Imaging/Diagnostics Last 24 Hours   No results found.     Electronically signed by Toshia Boateng MD on 11/9/2023 at 9:09 AM

## 2023-11-10 ENCOUNTER — APPOINTMENT (OUTPATIENT)
Dept: MRI IMAGING | Age: 68
End: 2023-11-10
Payer: MEDICARE

## 2023-11-10 PROBLEM — R55 SYNCOPE: Status: ACTIVE | Noted: 2023-11-10

## 2023-11-10 LAB
ANION GAP SERPL CALCULATED.3IONS-SCNC: 14 MMOL/L (ref 4–16)
BUN SERPL-MCNC: 13 MG/DL (ref 6–23)
CALCIUM SERPL-MCNC: 9.6 MG/DL (ref 8.3–10.6)
CHLORIDE BLD-SCNC: 100 MMOL/L (ref 99–110)
CO2: 23 MMOL/L (ref 21–32)
CREAT SERPL-MCNC: 0.9 MG/DL (ref 0.6–1.1)
GFR SERPL CREATININE-BSD FRML MDRD: >60 ML/MIN/1.73M2
GLUCOSE SERPL-MCNC: 164 MG/DL (ref 70–99)
HCT VFR BLD CALC: 37.4 % (ref 37–47)
HEMOGLOBIN: 12.2 GM/DL (ref 12.5–16)
MAGNESIUM: 1.7 MG/DL (ref 1.8–2.4)
MCH RBC QN AUTO: 30.5 PG (ref 27–31)
MCHC RBC AUTO-ENTMCNC: 32.6 % (ref 32–36)
MCV RBC AUTO: 93.5 FL (ref 78–100)
PDW BLD-RTO: 14.9 % (ref 11.7–14.9)
PLATELET # BLD: 282 K/CU MM (ref 140–440)
PMV BLD AUTO: 9.4 FL (ref 7.5–11.1)
POTASSIUM SERPL-SCNC: 4.3 MMOL/L (ref 3.5–5.1)
RBC # BLD: 4 M/CU MM (ref 4.2–5.4)
SODIUM BLD-SCNC: 137 MMOL/L (ref 135–145)
WBC # BLD: 3.6 K/CU MM (ref 4–10.5)

## 2023-11-10 PROCEDURE — 99232 SBSQ HOSP IP/OBS MODERATE 35: CPT | Performed by: INTERNAL MEDICINE

## 2023-11-10 PROCEDURE — G0378 HOSPITAL OBSERVATION PER HR: HCPCS

## 2023-11-10 PROCEDURE — 96372 THER/PROPH/DIAG INJ SC/IM: CPT

## 2023-11-10 PROCEDURE — 94762 N-INVAS EAR/PLS OXIMTRY CONT: CPT

## 2023-11-10 PROCEDURE — 96376 TX/PRO/DX INJ SAME DRUG ADON: CPT

## 2023-11-10 PROCEDURE — 2580000003 HC RX 258: Performed by: NURSE PRACTITIONER

## 2023-11-10 PROCEDURE — 96366 THER/PROPH/DIAG IV INF ADDON: CPT

## 2023-11-10 PROCEDURE — 80048 BASIC METABOLIC PNL TOTAL CA: CPT

## 2023-11-10 PROCEDURE — 2580000003 HC RX 258: Performed by: CLINICAL NURSE SPECIALIST

## 2023-11-10 PROCEDURE — 36415 COLL VENOUS BLD VENIPUNCTURE: CPT

## 2023-11-10 PROCEDURE — 6370000000 HC RX 637 (ALT 250 FOR IP)

## 2023-11-10 PROCEDURE — 85027 COMPLETE CBC AUTOMATED: CPT

## 2023-11-10 PROCEDURE — 99233 SBSQ HOSP IP/OBS HIGH 50: CPT | Performed by: CLINICAL NURSE SPECIALIST

## 2023-11-10 PROCEDURE — 6370000000 HC RX 637 (ALT 250 FOR IP): Performed by: NURSE PRACTITIONER

## 2023-11-10 PROCEDURE — 6360000002 HC RX W HCPCS: Performed by: INTERNAL MEDICINE

## 2023-11-10 PROCEDURE — 2500000003 HC RX 250 WO HCPCS: Performed by: CLINICAL NURSE SPECIALIST

## 2023-11-10 PROCEDURE — 70551 MRI BRAIN STEM W/O DYE: CPT

## 2023-11-10 PROCEDURE — 6360000002 HC RX W HCPCS: Performed by: NURSE PRACTITIONER

## 2023-11-10 PROCEDURE — 6360000002 HC RX W HCPCS: Performed by: CLINICAL NURSE SPECIALIST

## 2023-11-10 PROCEDURE — 83735 ASSAY OF MAGNESIUM: CPT

## 2023-11-10 RX ORDER — ALBUTEROL SULFATE 90 UG/1
2 AEROSOL, METERED RESPIRATORY (INHALATION) PRN
Status: DISCONTINUED | OUTPATIENT
Start: 2023-11-10 | End: 2023-11-11 | Stop reason: HOSPADM

## 2023-11-10 RX ORDER — NITROGLYCERIN 0.4 MG/1
0.4 TABLET SUBLINGUAL EVERY 5 MIN PRN
Status: ACTIVE | OUTPATIENT
Start: 2023-11-10 | End: 2023-11-10

## 2023-11-10 RX ORDER — SODIUM CHLORIDE 9 MG/ML
500 INJECTION, SOLUTION INTRAVENOUS CONTINUOUS PRN
Status: DISPENSED | OUTPATIENT
Start: 2023-11-10 | End: 2023-11-10

## 2023-11-10 RX ORDER — SODIUM CHLORIDE 0.9 % (FLUSH) 0.9 %
5-40 SYRINGE (ML) INJECTION PRN
Status: ACTIVE | OUTPATIENT
Start: 2023-11-10 | End: 2023-11-10

## 2023-11-10 RX ORDER — AMLODIPINE BESYLATE 5 MG/1
5 TABLET ORAL DAILY
Status: DISCONTINUED | OUTPATIENT
Start: 2023-11-10 | End: 2023-11-11

## 2023-11-10 RX ORDER — MAGNESIUM SULFATE IN WATER 40 MG/ML
2000 INJECTION, SOLUTION INTRAVENOUS ONCE
Status: COMPLETED | OUTPATIENT
Start: 2023-11-10 | End: 2023-11-10

## 2023-11-10 RX ORDER — METOPROLOL TARTRATE 5 MG/5ML
5 INJECTION INTRAVENOUS EVERY 5 MIN PRN
Status: ACTIVE | OUTPATIENT
Start: 2023-11-10 | End: 2023-11-10

## 2023-11-10 RX ORDER — ATROPINE SULFATE 0.1 MG/ML
0.5 INJECTION INTRAVENOUS EVERY 5 MIN PRN
Status: ACTIVE | OUTPATIENT
Start: 2023-11-10 | End: 2023-11-10

## 2023-11-10 RX ADMIN — CETIRIZINE HYDROCHLORIDE 10 MG: 10 TABLET, FILM COATED ORAL at 08:53

## 2023-11-10 RX ADMIN — VALPROATE SODIUM 500 MG: 100 INJECTION, SOLUTION INTRAVENOUS at 05:35

## 2023-11-10 RX ADMIN — DICYCLOMINE HYDROCHLORIDE 20 MG: 20 TABLET ORAL at 15:20

## 2023-11-10 RX ADMIN — SODIUM CHLORIDE, PRESERVATIVE FREE 10 ML: 5 INJECTION INTRAVENOUS at 21:23

## 2023-11-10 RX ADMIN — ATORVASTATIN CALCIUM 10 MG: 10 TABLET, FILM COATED ORAL at 08:52

## 2023-11-10 RX ADMIN — ASPIRIN 81 MG: 81 TABLET, CHEWABLE ORAL at 08:52

## 2023-11-10 RX ADMIN — DICYCLOMINE HYDROCHLORIDE 20 MG: 20 TABLET ORAL at 21:22

## 2023-11-10 RX ADMIN — LISINOPRIL 20 MG: 20 TABLET ORAL at 08:53

## 2023-11-10 RX ADMIN — DICYCLOMINE HYDROCHLORIDE 20 MG: 20 TABLET ORAL at 05:31

## 2023-11-10 RX ADMIN — DEXAMETHASONE SODIUM PHOSPHATE 4 MG: 4 INJECTION, SOLUTION INTRAMUSCULAR; INTRAVENOUS at 08:53

## 2023-11-10 RX ADMIN — OXYBUTYNIN CHLORIDE 5 MG: 5 TABLET, EXTENDED RELEASE ORAL at 21:23

## 2023-11-10 RX ADMIN — ACETAMINOPHEN 650 MG: 325 TABLET ORAL at 21:22

## 2023-11-10 RX ADMIN — DEXAMETHASONE SODIUM PHOSPHATE 4 MG: 4 INJECTION, SOLUTION INTRAMUSCULAR; INTRAVENOUS at 02:02

## 2023-11-10 RX ADMIN — PANTOPRAZOLE SODIUM 20 MG: 20 TABLET, DELAYED RELEASE ORAL at 08:53

## 2023-11-10 RX ADMIN — MAGNESIUM SULFATE HEPTAHYDRATE 2000 MG: 40 INJECTION, SOLUTION INTRAVENOUS at 11:36

## 2023-11-10 RX ADMIN — FAMOTIDINE 40 MG: 20 TABLET ORAL at 21:22

## 2023-11-10 RX ADMIN — LISINOPRIL 20 MG: 20 TABLET ORAL at 21:22

## 2023-11-10 RX ADMIN — ESTRADIOL 1 MG: 1 TABLET ORAL at 09:00

## 2023-11-10 RX ADMIN — VALPROATE SODIUM 500 MG: 100 INJECTION, SOLUTION INTRAVENOUS at 21:25

## 2023-11-10 RX ADMIN — Medication 1 TABLET: at 15:20

## 2023-11-10 RX ADMIN — Medication 1 TABLET: at 08:52

## 2023-11-10 RX ADMIN — SODIUM CHLORIDE, PRESERVATIVE FREE 10 ML: 5 INJECTION INTRAVENOUS at 08:54

## 2023-11-10 RX ADMIN — CLONAZEPAM 0.5 MG: 0.5 TABLET ORAL at 16:48

## 2023-11-10 RX ADMIN — DEXAMETHASONE SODIUM PHOSPHATE 4 MG: 4 INJECTION, SOLUTION INTRAMUSCULAR; INTRAVENOUS at 15:20

## 2023-11-10 RX ADMIN — CITALOPRAM HYDROBROMIDE 20 MG: 20 TABLET ORAL at 08:53

## 2023-11-10 RX ADMIN — VALPROATE SODIUM 500 MG: 100 INJECTION, SOLUTION INTRAVENOUS at 15:27

## 2023-11-10 RX ADMIN — DIPHENHYDRAMINE HYDROCHLORIDE 25 MG: 25 TABLET ORAL at 21:22

## 2023-11-10 RX ADMIN — DICYCLOMINE HYDROCHLORIDE 20 MG: 20 TABLET ORAL at 11:34

## 2023-11-10 RX ADMIN — DEXAMETHASONE SODIUM PHOSPHATE 4 MG: 4 INJECTION, SOLUTION INTRAMUSCULAR; INTRAVENOUS at 19:38

## 2023-11-10 RX ADMIN — AMLODIPINE BESYLATE 5 MG: 5 TABLET ORAL at 15:20

## 2023-11-10 RX ADMIN — ENOXAPARIN SODIUM 40 MG: 100 INJECTION SUBCUTANEOUS at 08:53

## 2023-11-10 ASSESSMENT — PAIN DESCRIPTION - ORIENTATION
ORIENTATION: MID
ORIENTATION: MID

## 2023-11-10 ASSESSMENT — PAIN SCALES - GENERAL
PAINLEVEL_OUTOF10: 6
PAINLEVEL_OUTOF10: 2
PAINLEVEL_OUTOF10: 0

## 2023-11-10 ASSESSMENT — PAIN DESCRIPTION - PAIN TYPE
TYPE: ACUTE PAIN
TYPE: ACUTE PAIN

## 2023-11-10 ASSESSMENT — PAIN DESCRIPTION - LOCATION
LOCATION: ABDOMEN
LOCATION: HEAD
LOCATION: HEAD

## 2023-11-10 ASSESSMENT — PAIN DESCRIPTION - DESCRIPTORS
DESCRIPTORS: THROBBING
DESCRIPTORS: THROBBING

## 2023-11-10 ASSESSMENT — PAIN - FUNCTIONAL ASSESSMENT
PAIN_FUNCTIONAL_ASSESSMENT: ACTIVITIES ARE NOT PREVENTED
PAIN_FUNCTIONAL_ASSESSMENT: ACTIVITIES ARE NOT PREVENTED

## 2023-11-10 NOTE — PLAN OF CARE
Problem: Discharge Planning  Goal: Discharge to home or other facility with appropriate resources  11/10/2023 1120 by Desirae Rowell LPN  Outcome: Progressing  11/9/2023 2345 by William Gallo RN  Outcome: Progressing     Problem: Pain  Goal: Verbalizes/displays adequate comfort level or baseline comfort level  11/9/2023 2345 by William Gallo RN  Outcome: Progressing

## 2023-11-10 NOTE — PROGRESS NOTES
[place]  [facility] [place]      Name:  Yon Roman /Age/Sex: 1955  (74 y.o. female)   MRN & CSN:  [id number] & [id number] Encounter Date/Time: 11/10/2023 9:09 AM EST    Location:  [address] [place] [address]-A PCP: Tammy Turk MD       Hospital Day: 3    Assessment and Plan:   Yon Roman is a 70 y.o. female  who presents with Bradycardia    Bradycardia  -was advised by Dr. Barbie Gunter to come to the ED for a pause on her Event Monitor, having bradycardic episodes that need addressed by EP   -EP Consult: Dr. Orlando Coburn, appreciate recs   -Cardiology Consult: Dr. Barbie Gunter, to increase lisinopril to 40 mg, HOLD lopressor in AM, may need to add Norvasc instead   -attempted to have a Treadmill Stress Test earlier today, had intense headache and systolic BP was 849'D, had to cancel  -normal stress test  -Admit for Observation on Telemetry and Cardiology and EP Consults   -Per EP, pauses on event monitor appears to be an artifact, stop metoprolol due to bradycardia    #Persistent headache, improving  -2.5 months of headache, described as \"in between a high blood pressure headache and a migraine\"  -no aura  Neuro consulted, appreciate recs  -->depacon and decadron ordered  MRI brain ordered, pending     Hypomagnesemia  -Mag 1.7, replaced in ED, recheck tomorrow am      CAD  -cont ASA 81 mg, atorvastatin 10 mg      Hypertension  -cont lisinopril 20 mg BID, HOLDing lopressor r/t bradycardia  -Maxzide 37.5/25 daily prn for edema      IBS  -cont bentyl 20 mg Q6 hrs     Anxiety/Depression  -cont Celexa 20 mg     Overactive Bladder  -cont Ditropan XL 5 mg and estrace 1 mg qd      GERD  -cont PPI qd and Pepcid 40 mg qhs       Diet ADULT DIET;  Regular   DVT Prophylaxis [x] Lovenox, []  Heparin, [] SCDs, [] Ambulation,  [] Eliquis, [] Xarelto  [] Coumadin   Code Status Full Code   Disposition From: home  Expected Disposition: home  Estimated Date of Discharge: 1 day  Patient requires continued admission due to

## 2023-11-10 NOTE — PROGRESS NOTES
Today's plan:  Stress test shows normal chronotropic competence, no further cardiac work-up recommended, patient has headache recommend medical team evaluation and  neurology consult appreciated for headache continue IV Decadron MRI of the brain is done recommend to follow-up on that we will sign off please call us again as needed      Admit Date:  11/8/2023    Subjective: Headache      Chief complaints on admission  Chief Complaint   Patient presents with    Ambulatory Cardiac Monitoring     Pt has a cardiac monitor and was told she had a pause last night and was told to come in to be seen. Cardiology would like to be paged. History of present illness:Toña is a 70 y. o.year old who  presents with had concerns including Ambulatory Cardiac Monitoring (Pt has a cardiac monitor and was told she had a pause last night and was told to come in to be seen. Cardiology would like to be paged. ). Past medical history:    has a past medical history of Anxiety, CAD (coronary artery disease), Chest pain, Chronic back pain, Depression, Essential hypertension, GERD (gastroesophageal reflux disease), H/O cardiovascular stress test, H/O Doppler ultrasound, H/O echocardiogram, H/O echocardiogram, H/O exercise stress test, H/O percutaneous left heart catheterization, H/O tilt table evaluation, History of transesophageal echocardiography (MEHUL), Hx of cardiovascular stress test, Hx of cardiovascular stress test, Hx of cardiovascular stress test, Hx of echocardiogram, Hyperlipidemia, Irritable bowel syndrome, Orthostatic hypotension, Other activity(E029.9), Post PTCA, Post PTCA, Raynaud's disease, and S/P transesophageal echocardiogram (MEHUL). Past surgical history:   has a past surgical history that includes Hysterectomy; Ulnar tunnel release (11/2006); Wrist surgery (7/2008); Coronary angioplasty with stent (8/23/13); Cardiac catheterization;  Tonsillectomy and adenoidectomy; Cardiac catheterization (03/11/2014); hernia sulfate HFA, albuterol sulfate HFA, fentaNYL, prochlorperazine, hydrALAZINE, clonazePAM, diphenhydrAMINE, triamterene-hydroCHLOROthiazide, sodium chloride flush, sodium chloride, potassium chloride **OR** potassium alternative oral replacement **OR** potassium chloride, magnesium sulfate, ondansetron **OR** ondansetron, polyethylene glycol, acetaminophen **OR** acetaminophen, sennosides    Lab Data:  CBC:   Recent Labs     11/08/23  1102 11/09/23 0618 11/10/23  0257   WBC 5.5 5.2 3.6*   HGB 11.7* 11.3* 12.2*   HCT 35.6* 34.9* 37.4   MCV 93.7 93.8 93.5    251 282       BMP:   Recent Labs     11/08/23  1102 11/09/23  0618 11/10/23  0257    139 137   K 3.8 4.3 4.3    103 100   CO2 25 25 23   BUN 9 11 13   CREATININE 0.9 0.8 0.9       LIVER PROFILE:   Recent Labs     11/08/23  1102   AST 16   ALT 8*   BILITOT 0.4   ALKPHOS 96       PT/INR:   Recent Labs     11/08/23  1102   PROTIME 13.9   INR 1.1       APTT: No results for input(s): \"APTT\" in the last 72 hours. BNP:  No results for input(s): \"BNP\" in the last 72 hours. TROPONIN: @TROPONINI:3@      Assessment:  70 y. o.year old who is admitted for          Plan:  4.9-second pause on cardiac monitoring, there are 2 event report noted first 1 suggested 4.9-second pause however the second revision of the transmission suggested lead and lead loss artifact we, case discussed with the EP and Stress test as above okay for discharge from cardiac standpoint  Headache recommend neurology and medical evaluation  Uncontrolled hypertension, now better controlled, continue lisinopril Maxzide, off Lopressor. Aortic regurgitations: it was moderate in 3/21 and her last echo in 2/22 suggested severe aortic stenosis, howvever her MEHUL suggested its still moderate, will defer surgical evaluation at this time.   Mild throat tighthness post MEHUL: normal,  Vasovagal attacks: tilt table test was normal in 2021, will repeat again, she had HTN she has sweatings, she keep a fan

## 2023-11-10 NOTE — PROGRESS NOTES
11/10/23 0610   Oxygen Therapy/Pulse Ox   O2 Therapy Room air   O2 Device None (Room air)   Pulse 58   Respirations 21   Pulse Oximeter Device Mode Continuous   Pulse Oximeter Device Location Finger   $Pulse Oximeter $Overnight   Blood Gas  Performed?  No

## 2023-11-10 NOTE — PROGRESS NOTES
Mri on hold until nurse checks to see how long her magnesium is running and see if she needs aditional meds for mri besides her prn Klonopin

## 2023-11-10 NOTE — PROGRESS NOTES
Apnea Link testis completed. Pt was on room air. The results are in the pt's soft chart. The pt tolerated well.

## 2023-11-11 VITALS
OXYGEN SATURATION: 95 % | DIASTOLIC BLOOD PRESSURE: 78 MMHG | HEIGHT: 67 IN | TEMPERATURE: 98.3 F | WEIGHT: 195.11 LBS | RESPIRATION RATE: 20 BRPM | HEART RATE: 84 BPM | BODY MASS INDEX: 30.62 KG/M2 | SYSTOLIC BLOOD PRESSURE: 153 MMHG

## 2023-11-11 LAB
ANION GAP SERPL CALCULATED.3IONS-SCNC: 13 MMOL/L (ref 4–16)
BUN SERPL-MCNC: 17 MG/DL (ref 6–23)
CALCIUM SERPL-MCNC: 9.3 MG/DL (ref 8.3–10.6)
CHLORIDE BLD-SCNC: 104 MMOL/L (ref 99–110)
CO2: 21 MMOL/L (ref 21–32)
CREAT SERPL-MCNC: 0.9 MG/DL (ref 0.6–1.1)
GFR SERPL CREATININE-BSD FRML MDRD: >60 ML/MIN/1.73M2
GLUCOSE SERPL-MCNC: 149 MG/DL (ref 70–99)
HCT VFR BLD CALC: 38.4 % (ref 37–47)
HEMOGLOBIN: 11.9 GM/DL (ref 12.5–16)
MAGNESIUM: 2.2 MG/DL (ref 1.8–2.4)
MCH RBC QN AUTO: 30.3 PG (ref 27–31)
MCHC RBC AUTO-ENTMCNC: 31 % (ref 32–36)
MCV RBC AUTO: 97.7 FL (ref 78–100)
PDW BLD-RTO: 15.1 % (ref 11.7–14.9)
PLATELET # BLD: 246 K/CU MM (ref 140–440)
PMV BLD AUTO: 9.1 FL (ref 7.5–11.1)
POTASSIUM SERPL-SCNC: 4.8 MMOL/L (ref 3.5–5.1)
RBC # BLD: 3.93 M/CU MM (ref 4.2–5.4)
SODIUM BLD-SCNC: 138 MMOL/L (ref 135–145)
WBC # BLD: 13.1 K/CU MM (ref 4–10.5)

## 2023-11-11 PROCEDURE — 2580000003 HC RX 258: Performed by: CLINICAL NURSE SPECIALIST

## 2023-11-11 PROCEDURE — 6370000000 HC RX 637 (ALT 250 FOR IP): Performed by: NURSE PRACTITIONER

## 2023-11-11 PROCEDURE — 85027 COMPLETE CBC AUTOMATED: CPT

## 2023-11-11 PROCEDURE — 96372 THER/PROPH/DIAG INJ SC/IM: CPT

## 2023-11-11 PROCEDURE — 2500000003 HC RX 250 WO HCPCS: Performed by: CLINICAL NURSE SPECIALIST

## 2023-11-11 PROCEDURE — 96366 THER/PROPH/DIAG IV INF ADDON: CPT

## 2023-11-11 PROCEDURE — 6360000002 HC RX W HCPCS: Performed by: NURSE PRACTITIONER

## 2023-11-11 PROCEDURE — 6370000000 HC RX 637 (ALT 250 FOR IP): Performed by: STUDENT IN AN ORGANIZED HEALTH CARE EDUCATION/TRAINING PROGRAM

## 2023-11-11 PROCEDURE — 6360000002 HC RX W HCPCS: Performed by: STUDENT IN AN ORGANIZED HEALTH CARE EDUCATION/TRAINING PROGRAM

## 2023-11-11 PROCEDURE — 99232 SBSQ HOSP IP/OBS MODERATE 35: CPT | Performed by: NURSE PRACTITIONER

## 2023-11-11 PROCEDURE — 6370000000 HC RX 637 (ALT 250 FOR IP)

## 2023-11-11 PROCEDURE — 36415 COLL VENOUS BLD VENIPUNCTURE: CPT

## 2023-11-11 PROCEDURE — 6360000002 HC RX W HCPCS: Performed by: CLINICAL NURSE SPECIALIST

## 2023-11-11 PROCEDURE — 80048 BASIC METABOLIC PNL TOTAL CA: CPT

## 2023-11-11 PROCEDURE — G0378 HOSPITAL OBSERVATION PER HR: HCPCS

## 2023-11-11 PROCEDURE — 83735 ASSAY OF MAGNESIUM: CPT

## 2023-11-11 PROCEDURE — 6360000002 HC RX W HCPCS

## 2023-11-11 PROCEDURE — 94761 N-INVAS EAR/PLS OXIMETRY MLT: CPT

## 2023-11-11 PROCEDURE — 2580000003 HC RX 258: Performed by: NURSE PRACTITIONER

## 2023-11-11 PROCEDURE — 96376 TX/PRO/DX INJ SAME DRUG ADON: CPT

## 2023-11-11 RX ORDER — METHYLPREDNISOLONE 4 MG/1
4 TABLET ORAL
Status: DISCONTINUED | OUTPATIENT
Start: 2023-11-12 | End: 2023-11-11 | Stop reason: HOSPADM

## 2023-11-11 RX ORDER — ACETAMINOPHEN 325 MG/1
650 TABLET ORAL EVERY 6 HOURS PRN
Qty: 120 TABLET | Refills: 3 | Status: SHIPPED | OUTPATIENT
Start: 2023-11-11

## 2023-11-11 RX ORDER — METHYLPREDNISOLONE 8 MG/1
24 TABLET ORAL ONCE
Qty: 3 TABLET | Refills: 0 | Status: SHIPPED | OUTPATIENT
Start: 2023-11-11 | End: 2023-11-11 | Stop reason: HOSPADM

## 2023-11-11 RX ORDER — METHYLPREDNISOLONE 4 MG/1
4 TABLET ORAL NIGHTLY
Status: DISCONTINUED | OUTPATIENT
Start: 2023-11-13 | End: 2023-11-11 | Stop reason: HOSPADM

## 2023-11-11 RX ORDER — METHYLPREDNISOLONE 4 MG/1
4 TABLET ORAL
Qty: 5 TABLET | Refills: 0 | Status: SHIPPED | OUTPATIENT
Start: 2023-11-12 | End: 2023-11-11 | Stop reason: HOSPADM

## 2023-11-11 RX ORDER — AMLODIPINE BESYLATE 10 MG/1
10 TABLET ORAL DAILY
Status: DISCONTINUED | OUTPATIENT
Start: 2023-11-12 | End: 2023-11-11 | Stop reason: HOSPADM

## 2023-11-11 RX ORDER — METHYLPREDNISOLONE 8 MG/1
8 TABLET ORAL NIGHTLY
Qty: 1 TABLET | Refills: 0 | Status: SHIPPED | OUTPATIENT
Start: 2023-11-12 | End: 2023-11-11 | Stop reason: HOSPADM

## 2023-11-11 RX ORDER — METHYLPREDNISOLONE 4 MG/1
4 TABLET ORAL
Qty: 2 TABLET | Refills: 0 | Status: SHIPPED | OUTPATIENT
Start: 2023-11-12 | End: 2023-11-11 | Stop reason: HOSPADM

## 2023-11-11 RX ORDER — AMLODIPINE BESYLATE 10 MG/1
10 TABLET ORAL DAILY
Qty: 30 TABLET | Refills: 3 | Status: SHIPPED | OUTPATIENT
Start: 2023-11-12

## 2023-11-11 RX ORDER — AMLODIPINE BESYLATE 5 MG/1
5 TABLET ORAL ONCE
Status: COMPLETED | OUTPATIENT
Start: 2023-11-11 | End: 2023-11-11

## 2023-11-11 RX ORDER — METHYLPREDNISOLONE 4 MG/1
TABLET ORAL
Qty: 15 TABLET | Refills: 0 | Status: SHIPPED | OUTPATIENT
Start: 2023-11-11 | End: 2023-11-19

## 2023-11-11 RX ORDER — METHYLPREDNISOLONE 4 MG/1
4 TABLET ORAL
Qty: 3 TABLET | Refills: 0 | Status: SHIPPED | OUTPATIENT
Start: 2023-11-12 | End: 2023-11-11 | Stop reason: HOSPADM

## 2023-11-11 RX ORDER — METHYLPREDNISOLONE 4 MG/1
8 TABLET ORAL NIGHTLY
Status: DISCONTINUED | OUTPATIENT
Start: 2023-11-12 | End: 2023-11-11 | Stop reason: HOSPADM

## 2023-11-11 RX ORDER — METHYLPREDNISOLONE 4 MG/1
4 TABLET ORAL NIGHTLY
Qty: 3 TABLET | Refills: 0 | Status: SHIPPED | OUTPATIENT
Start: 2023-11-13 | End: 2023-11-11 | Stop reason: HOSPADM

## 2023-11-11 RX ADMIN — DEXAMETHASONE SODIUM PHOSPHATE 4 MG: 4 INJECTION, SOLUTION INTRAMUSCULAR; INTRAVENOUS at 01:50

## 2023-11-11 RX ADMIN — Medication 1 TABLET: at 08:55

## 2023-11-11 RX ADMIN — DEXAMETHASONE SODIUM PHOSPHATE 4 MG: 4 INJECTION, SOLUTION INTRAMUSCULAR; INTRAVENOUS at 08:54

## 2023-11-11 RX ADMIN — VALPROATE SODIUM 500 MG: 100 INJECTION, SOLUTION INTRAVENOUS at 06:30

## 2023-11-11 RX ADMIN — LISINOPRIL 20 MG: 20 TABLET ORAL at 08:55

## 2023-11-11 RX ADMIN — ACETAMINOPHEN 650 MG: 325 TABLET ORAL at 06:24

## 2023-11-11 RX ADMIN — DEXAMETHASONE SODIUM PHOSPHATE 4 MG: 4 INJECTION, SOLUTION INTRAMUSCULAR; INTRAVENOUS at 13:21

## 2023-11-11 RX ADMIN — ASPIRIN 81 MG: 81 TABLET, CHEWABLE ORAL at 08:55

## 2023-11-11 RX ADMIN — PANTOPRAZOLE SODIUM 20 MG: 20 TABLET, DELAYED RELEASE ORAL at 08:55

## 2023-11-11 RX ADMIN — AMLODIPINE BESYLATE 5 MG: 5 TABLET ORAL at 08:55

## 2023-11-11 RX ADMIN — TRIAMTERENE AND HYDROCHLOROTHIAZIDE 1 TABLET: 37.5; 25 TABLET ORAL at 03:58

## 2023-11-11 RX ADMIN — CITALOPRAM HYDROBROMIDE 20 MG: 20 TABLET ORAL at 08:55

## 2023-11-11 RX ADMIN — ATORVASTATIN CALCIUM 10 MG: 10 TABLET, FILM COATED ORAL at 08:55

## 2023-11-11 RX ADMIN — SODIUM CHLORIDE, PRESERVATIVE FREE 10 ML: 5 INJECTION INTRAVENOUS at 08:57

## 2023-11-11 RX ADMIN — ENOXAPARIN SODIUM 40 MG: 100 INJECTION SUBCUTANEOUS at 08:55

## 2023-11-11 RX ADMIN — ESTRADIOL 1 MG: 1 TABLET ORAL at 08:56

## 2023-11-11 RX ADMIN — DICYCLOMINE HYDROCHLORIDE 20 MG: 20 TABLET ORAL at 13:21

## 2023-11-11 RX ADMIN — HYDRALAZINE HYDROCHLORIDE 10 MG: 20 INJECTION INTRAMUSCULAR; INTRAVENOUS at 03:58

## 2023-11-11 RX ADMIN — AMLODIPINE BESYLATE 5 MG: 5 TABLET ORAL at 13:21

## 2023-11-11 RX ADMIN — DICYCLOMINE HYDROCHLORIDE 20 MG: 20 TABLET ORAL at 03:58

## 2023-11-11 RX ADMIN — CETIRIZINE HYDROCHLORIDE 10 MG: 10 TABLET, FILM COATED ORAL at 08:55

## 2023-11-11 RX ADMIN — CLONAZEPAM 0.5 MG: 0.5 TABLET ORAL at 01:50

## 2023-11-11 RX ADMIN — METHYLPREDNISOLONE 24 MG: 16 TABLET ORAL at 15:27

## 2023-11-11 ASSESSMENT — PAIN SCALES - GENERAL: PAINLEVEL_OUTOF10: 3

## 2023-11-11 ASSESSMENT — PAIN DESCRIPTION - DESCRIPTORS: DESCRIPTORS: THROBBING

## 2023-11-11 ASSESSMENT — PAIN DESCRIPTION - ORIENTATION: ORIENTATION: MID

## 2023-11-11 ASSESSMENT — PAIN - FUNCTIONAL ASSESSMENT: PAIN_FUNCTIONAL_ASSESSMENT: ACTIVITIES ARE NOT PREVENTED

## 2023-11-11 ASSESSMENT — PAIN DESCRIPTION - LOCATION: LOCATION: HEAD

## 2023-11-11 NOTE — CARE COORDINATION
notified this CM that pt needs [facility]. CM met with pt and provided her with a [address] list.  Pt has requested Pr[facility].   Referral faxed to [facility] at [phone] and informed them of d/c.  TE

## 2023-11-11 NOTE — PROGRESS NOTES
Neurology Progress Note  [facility]  Patient Name: Cody Maradiaga     : 1955      Subjective:   C C: headache  The patient was seen and examined. Chart reviewed in detail. Patient is sitting in bed, lights off. She is feeling better but continues to have headache. It has significantly improved. She tells me she is concerned about BP still being mildly elevated.      Objective:   Scheduled Meds:   [START ON 2023] amLODIPine  10 mg Oral Daily    methylPREDNISolone  24 mg Oral Once    [START ON 2023] methylPREDNISolone  4 mg Oral QAM AC    [START ON 2023] methylPREDNISolone  4 mg Oral Lunch    [START ON 2023] methylPREDNISolone  4 mg Oral Dinner    [START ON 2023] methylPREDNISolone  8 mg Oral Nightly    [START ON 2023] methylPREDNISolone  4 mg Oral Nightly    valproate  500 mg IntraVENous Q8H    dexamethasone  4 mg IntraVENous Q6H    aspirin  81 mg Oral Daily    oyster shell calcium w/D  1 tablet Oral BID WC    citalopram  20 mg Oral Daily    dicyclomine  20 mg Oral Q6H    estradiol  1 mg Oral Daily    famotidine  40 mg Oral Nightly    lisinopril  20 mg Oral BID    cetirizine  10 mg Oral Daily    oxybutynin  5 mg Oral Nightly    pantoprazole  20 mg Oral Daily    atorvastatin  10 mg Oral Daily    sodium chloride flush  5-40 mL IntraVENous 2 times per day    enoxaparin  40 mg SubCUTAneous Daily    triamterene-hydroCHLOROthiazide  1 tablet Oral Once     Continuous Infusions:      PRN Meds:.albuterol sulfate HFA, albuterol sulfate HFA, fentaNYL, prochlorperazine, hydrALAZINE, clonazePAM, diphenhydrAMINE, triamterene-hydroCHLOROthiazide, potassium chloride **OR** potassium alternative oral replacement **OR** potassium chloride, magnesium sulfate, ondansetron **OR** ondansetron, polyethylene glycol, acetaminophen **OR** acetaminophen, sennosides    Vital Signs:  Vitals:    11/10/23 2120 23 0330 23 0845 23 0852   BP: (!) 176/73 (!) 178/65

## 2023-11-11 NOTE — DISCHARGE SUMMARY
aura  Neuro consulted, appreciate recs  S/p depacon and decadron ordered  MRI brain  - no acute process. Neurology advised to DC on Medrol Pack. Follow up as OP if Headache persist         CAD  -cont ASA 81 mg, atorvastatin 10 mg      Un controlled Hypertension - BP better today   -cont lisinopril 20 mg BID, HOLDing lopressor r/t bradycardia  -Maxzide 37.5/25 daily prn for edema   Will increase Amlodipine to 10 mg PO daily. Will get Visiting RN to follow up on BP     IBS  -cont bentyl 20 mg Q6 hrs     Anxiety/Depression  -cont Celexa 20 mg     Overactive Bladder  -cont Ditropan XL 5 mg and estrace 1 mg qd      GERD  -cont PPI qd and Pepcid 40 mg qhs       The patient expressed appropriate understanding of, and agreement with the discharge recommendations, medications, and plan.      Consults this admission:  IP CONSULT TO CARDIOLOGY  IP CONSULT TO ELECTROPHYSIOLOGY  IP CONSULT TO NEUROLOGY  IP CONSULT TO HOME CARE NEEDS    Discharge Diagnosis:   Bradycardia      Discharge Instruction:   Follow up appointments: cardiology, neurology   Primary care physician: Chalino Hancock MD within 2 weeks  Diet: cardiac diet   Activity: activity as tolerated  Disposition: Discharged to:   [x]Home, [x][facility], []SNF, []Acute Rehab, []Hospice   Condition on discharge: Stable  Labs and Tests to be Followed up as an outpatient by PCP or Specialist:     Discharge Medications:        Medication List        START taking these medications      amLODIPine 10 MG tablet  Commonly known as: NORVASC  Take 1 tablet by mouth daily  Start taking on: November 12, 2023     * methylPREDNISolone 8 MG tablet  Commonly known as: MEDROL  Take 3 tablets by mouth once for 1 dose     * methylPREDNISolone 4 MG tablet  Commonly known as: MEDROL  Take 1 tablet by mouth every morning (before breakfast) for 5 doses  Start taking on: November 12, 2023     * methylPREDNISolone 4 MG tablet  Commonly known as: MEDROL  Take 1 tablet by mouth Daily with 11/10/2023  EXAMINATION: MRI OF THE BRAIN WITHOUT CONTRAST  11/10/2023 4:58 pm TECHNIQUE: Multiplanar multisequence MRI of the brain was performed without the administration of intravenous contrast. COMPARISON: None. HISTORY: ORDERING SYSTEM PROVIDED HISTORY: ongoing headache, hypertension TECHNOLOGIST PROVIDED HISTORY: Reason for exam:->ongoing headache, hypertension Reason for Exam: ongoing headache- htn FINDINGS: INTRACRANIAL STRUCTURES/VENTRICLES: There is no acute infarct. No mass effect or midline shift. No evidence of an acute intracranial hemorrhage. The ventricles and sulci are normal in size and configuration. The sellar/suprasellar regions appear unremarkable. The normal signal voids within the major intracranial vessels appear maintained. Mild chronic white matter microvascular ischemic changes are present. ORBITS: The visualized portion of the orbits demonstrate no acute abnormality. SINUSES: There is a mild left mastoid effusion. BONES/SOFT TISSUES: The bone marrow signal intensity appears normal. The soft tissues demonstrate no acute abnormality. No acute intracranial abnormality visualized. Mild left mastoid effusion. Exercise stress test    Result Date: 11/9/2023    ECG: The ECG was negative for ischemia. Stress Test: A Santana protocol stress test was performed. The patient was stressed for 2 min and 59 sec. Normal stress test, normal chronotropic competence       CBC:   Recent Labs     11/09/23  0618 11/10/23  0257 11/11/23  0239   WBC 5.2 3.6* 13.1*   HGB 11.3* 12.2* 11.9*    282 246     BMP:    Recent Labs     11/09/23  0618 11/10/23  0257 11/11/23  0239    137 138   K 4.3 4.3 4.8    100 104   CO2 25 23 21   BUN 11 13 17   CREATININE 0.8 0.9 0.9   GLUCOSE 76 164* 149*     Hepatic: No results for input(s): \"AST\", \"ALT\", \"ALB\", \"BILITOT\", \"ALKPHOS\" in the last 72 hours.   Lipids:   Lab Results   Component Value Date/Time    CHOL 187 04/17/2013 12:00 AM    HDL 79

## 2023-11-13 LAB
EKG DIAGNOSIS: NORMAL
STRESS BASELINE DIAS BP: NORMAL MMHG
STRESS BASELINE HR: NORMAL BPM
STRESS BASELINE SYS BP: NORMAL MMHG
STRESS ESTIMATED WORKLOAD: NORMAL METS
STRESS PEAK DIAS BP: NORMAL MMHG
STRESS PEAK SYS BP: NORMAL MMHG
STRESS POST PEAK HR: 65 BPM

## 2023-11-27 ENCOUNTER — OFFICE VISIT (OUTPATIENT)
Dept: CARDIOLOGY CLINIC | Age: 68
End: 2023-11-27
Payer: MEDICARE

## 2023-11-27 VITALS
BODY MASS INDEX: 31.86 KG/M2 | SYSTOLIC BLOOD PRESSURE: 124 MMHG | WEIGHT: 203 LBS | OXYGEN SATURATION: 99 % | DIASTOLIC BLOOD PRESSURE: 60 MMHG | HEART RATE: 63 BPM | HEIGHT: 67 IN

## 2023-11-27 DIAGNOSIS — E78.2 MIXED HYPERLIPIDEMIA: ICD-10-CM

## 2023-11-27 DIAGNOSIS — Z01.810 PRE-OPERATIVE CARDIOVASCULAR EXAMINATION: ICD-10-CM

## 2023-11-27 DIAGNOSIS — I25.10 CORONARY ARTERY DISEASE INVOLVING NATIVE CORONARY ARTERY OF NATIVE HEART WITHOUT ANGINA PECTORIS: ICD-10-CM

## 2023-11-27 DIAGNOSIS — I35.1 NONRHEUMATIC AORTIC VALVE INSUFFICIENCY: ICD-10-CM

## 2023-11-27 DIAGNOSIS — I10 ESSENTIAL HYPERTENSION: ICD-10-CM

## 2023-11-27 DIAGNOSIS — R06.02 SHORTNESS OF BREATH: ICD-10-CM

## 2023-11-27 DIAGNOSIS — I87.2 VENOUS REFLUX: ICD-10-CM

## 2023-11-27 DIAGNOSIS — R55 VASOVAGAL ATTACK: ICD-10-CM

## 2023-11-27 DIAGNOSIS — R55 SYNCOPE, UNSPECIFIED SYNCOPE TYPE: Primary | ICD-10-CM

## 2023-11-27 DIAGNOSIS — R60.0 LEG EDEMA: ICD-10-CM

## 2023-11-27 DIAGNOSIS — R55 VASOVAGAL SYNCOPE: ICD-10-CM

## 2023-11-27 PROCEDURE — G8427 DOCREV CUR MEDS BY ELIG CLIN: HCPCS | Performed by: INTERNAL MEDICINE

## 2023-11-27 PROCEDURE — G8417 CALC BMI ABV UP PARAM F/U: HCPCS | Performed by: INTERNAL MEDICINE

## 2023-11-27 PROCEDURE — 3074F SYST BP LT 130 MM HG: CPT | Performed by: INTERNAL MEDICINE

## 2023-11-27 PROCEDURE — 1036F TOBACCO NON-USER: CPT | Performed by: INTERNAL MEDICINE

## 2023-11-27 PROCEDURE — 3078F DIAST BP <80 MM HG: CPT | Performed by: INTERNAL MEDICINE

## 2023-11-27 PROCEDURE — 1123F ACP DISCUSS/DSCN MKR DOCD: CPT | Performed by: INTERNAL MEDICINE

## 2023-11-27 PROCEDURE — 99214 OFFICE O/P EST MOD 30 MIN: CPT | Performed by: INTERNAL MEDICINE

## 2023-11-27 PROCEDURE — G8484 FLU IMMUNIZE NO ADMIN: HCPCS | Performed by: INTERNAL MEDICINE

## 2023-11-27 PROCEDURE — G8400 PT W/DXA NO RESULTS DOC: HCPCS | Performed by: INTERNAL MEDICINE

## 2023-11-27 PROCEDURE — 3017F COLORECTAL CA SCREEN DOC REV: CPT | Performed by: INTERNAL MEDICINE

## 2023-11-27 PROCEDURE — 1090F PRES/ABSN URINE INCON ASSESS: CPT | Performed by: INTERNAL MEDICINE

## 2023-11-27 RX ORDER — METHYLPREDNISOLONE 4 MG/1
2 TABLET ORAL DAILY
COMMUNITY

## 2023-11-27 NOTE — PATIENT INSTRUCTIONS
Please be informed that if you contact our office outside of normal business hours the physician on call cannot help with any scheduling or rescheduling issues, procedure instruction questions or any type of medication issue. We advise you for any urgent/emergency that you go to the nearest emergency room! PLEASE CALL OUR OFFICE DURING NORMAL BUSINESS HOURS    Monday - Friday   8 am to 5 pm    [place]: [address] [place]: [phone]    [place]:  [facility] Locations - No appointment necessary. Sites open Monday to Friday. Call your preferred location for test preparation, business   hours and other information you need. [employer] accepts [employer]. [address] [place] [address]. [facility]. [facility], [address] [facility]  Phone: [phone]     **It is YOUR responsibilty to bring medication bottles and/or updated medication list to [address] [place] [address]. This will allow us to better serve you and all your healthcare needs**  Thank you for allowing us to care for you today! We want to ensure we can follow your treatment plan and we strive to give you the best outcomes and experience possible. If you ever have a life threatening emergency and call 911 - for an ambulance (EMS)   Our providers can only care for you at:   [facility] or [facility]. Even if you have someone take you or you drive yourself we can only care for you in a [facility] facility. Our providers are not setup at the other healthcare locations! We are committed to providing you the best care possible. If you receive a survey after visiting one of our offices, please take time to share your experience concerning your physician office visit. These surveys are confidential and no health information about you is shared. We are eager to improve for you and we are counting on your feedback to help make that happen.

## 2023-11-27 NOTE — PROGRESS NOTES
catheterization, H/O tilt table evaluation, History of transesophageal echocardiography (MEHUL), Hx of cardiovascular stress test, Hx of cardiovascular stress test, Hx of cardiovascular stress test, Hx of echocardiogram, Hyperlipidemia, Irritable bowel syndrome, Orthostatic hypotension, Other activity(E029.9), Post PTCA, Post PTCA, Raynaud's disease, and S/P transesophageal echocardiogram (MEHUL). and presents with     Plan:  4.9 sec pause was artifact on transmission of event mon itor, she was admitted and had normal chronotropic competence on stress test, discussed with EP, no need for pacer  Headache: treated with IV decadrone, as per neurology, it was migraine and aggravated by uncontrolled HTN  HTN:discharged from hospital with uncontrolled HTN, now Relatively  controlled, continue isinopril to 20mg bid,and amlodpirin, and off maxzide and  lopressor  Syncope in June 2023: , she felt overheated before passing out recommend to increase oral hydration and avoid dehydration  30 LBS weight loss with omicron: stable now  Aortic regurgitations: it was moderate in 3/21 and her last echo in 2/22 suggested severe aortic stenosis, howvever her MEHUL suggested its still moderate, will defer surgical evaluation at this time. Mild throat tighthness post MEHUL: normal,  Vasovagal attacks: tilt table test was normal in 2021, will repeat again, she had HTN she has sweatings, she keep a fan and cold water bottle, will get records of her labs from  [id number] [facility] office  Memory loss: recommend to see neurologist, she has problem keeping tract of her thoughts, she is on celexa and buspar  Leg swelling\": venous doppler ordered, recommend compression, she takes maxide as needed. CAD:h/o lad and rca stent 2012 and 2013,last stress test was on 2/10/22Continue aspirin,continue lopressor and zocor. ? Anemia: will get labs from PMD office  Palpitations: stable  Dyslipidemia: continue statins  Umbilical hernia s/p sx. Health maintenance: exerise and   All

## 2023-12-06 ENCOUNTER — OFFICE VISIT (OUTPATIENT)
Dept: NEUROLOGY | Age: 68
End: 2023-12-06
Payer: MEDICARE

## 2023-12-06 VITALS
DIASTOLIC BLOOD PRESSURE: 62 MMHG | BODY MASS INDEX: 29.91 KG/M2 | OXYGEN SATURATION: 98 % | WEIGHT: 190.6 LBS | HEART RATE: 56 BPM | HEIGHT: 67 IN | SYSTOLIC BLOOD PRESSURE: 128 MMHG

## 2023-12-06 DIAGNOSIS — G43.001 MIGRAINE WITHOUT AURA AND WITH STATUS MIGRAINOSUS, NOT INTRACTABLE: Primary | ICD-10-CM

## 2023-12-06 PROCEDURE — G8400 PT W/DXA NO RESULTS DOC: HCPCS | Performed by: NURSE PRACTITIONER

## 2023-12-06 PROCEDURE — G8427 DOCREV CUR MEDS BY ELIG CLIN: HCPCS | Performed by: NURSE PRACTITIONER

## 2023-12-06 PROCEDURE — G8484 FLU IMMUNIZE NO ADMIN: HCPCS | Performed by: NURSE PRACTITIONER

## 2023-12-06 PROCEDURE — 3074F SYST BP LT 130 MM HG: CPT | Performed by: NURSE PRACTITIONER

## 2023-12-06 PROCEDURE — 1123F ACP DISCUSS/DSCN MKR DOCD: CPT | Performed by: NURSE PRACTITIONER

## 2023-12-06 PROCEDURE — 3078F DIAST BP <80 MM HG: CPT | Performed by: NURSE PRACTITIONER

## 2023-12-06 PROCEDURE — 3017F COLORECTAL CA SCREEN DOC REV: CPT | Performed by: NURSE PRACTITIONER

## 2023-12-06 PROCEDURE — 99213 OFFICE O/P EST LOW 20 MIN: CPT | Performed by: NURSE PRACTITIONER

## 2023-12-06 PROCEDURE — 1090F PRES/ABSN URINE INCON ASSESS: CPT | Performed by: NURSE PRACTITIONER

## 2023-12-06 PROCEDURE — G8417 CALC BMI ABV UP PARAM F/U: HCPCS | Performed by: NURSE PRACTITIONER

## 2023-12-06 PROCEDURE — 1036F TOBACCO NON-USER: CPT | Performed by: NURSE PRACTITIONER

## 2024-02-08 ENCOUNTER — TELEPHONE (OUTPATIENT)
Dept: CARDIOLOGY CLINIC | Age: 69
End: 2024-02-08

## 2024-02-28 ENCOUNTER — OFFICE VISIT (OUTPATIENT)
Dept: CARDIOLOGY CLINIC | Age: 69
End: 2024-02-28
Payer: MEDICARE

## 2024-02-28 VITALS
SYSTOLIC BLOOD PRESSURE: 118 MMHG | HEIGHT: 67 IN | HEART RATE: 64 BPM | DIASTOLIC BLOOD PRESSURE: 72 MMHG | BODY MASS INDEX: 30.61 KG/M2 | WEIGHT: 195 LBS

## 2024-02-28 DIAGNOSIS — R55 VASOVAGAL ATTACK: Primary | ICD-10-CM

## 2024-02-28 DIAGNOSIS — I10 ESSENTIAL HYPERTENSION: ICD-10-CM

## 2024-02-28 DIAGNOSIS — I25.10 CORONARY ARTERY DISEASE INVOLVING NATIVE CORONARY ARTERY OF NATIVE HEART WITHOUT ANGINA PECTORIS: ICD-10-CM

## 2024-02-28 DIAGNOSIS — R00.2 HEART PALPITATIONS: ICD-10-CM

## 2024-02-28 DIAGNOSIS — I87.2 VENOUS REFLUX: ICD-10-CM

## 2024-02-28 DIAGNOSIS — E78.2 MIXED HYPERLIPIDEMIA: ICD-10-CM

## 2024-02-28 PROCEDURE — G8484 FLU IMMUNIZE NO ADMIN: HCPCS | Performed by: NURSE PRACTITIONER

## 2024-02-28 PROCEDURE — 3074F SYST BP LT 130 MM HG: CPT | Performed by: NURSE PRACTITIONER

## 2024-02-28 PROCEDURE — 99214 OFFICE O/P EST MOD 30 MIN: CPT | Performed by: NURSE PRACTITIONER

## 2024-02-28 PROCEDURE — 3017F COLORECTAL CA SCREEN DOC REV: CPT | Performed by: NURSE PRACTITIONER

## 2024-02-28 PROCEDURE — G8427 DOCREV CUR MEDS BY ELIG CLIN: HCPCS | Performed by: NURSE PRACTITIONER

## 2024-02-28 PROCEDURE — 1090F PRES/ABSN URINE INCON ASSESS: CPT | Performed by: NURSE PRACTITIONER

## 2024-02-28 PROCEDURE — 1036F TOBACCO NON-USER: CPT | Performed by: NURSE PRACTITIONER

## 2024-02-28 PROCEDURE — 1123F ACP DISCUSS/DSCN MKR DOCD: CPT | Performed by: NURSE PRACTITIONER

## 2024-02-28 PROCEDURE — 3078F DIAST BP <80 MM HG: CPT | Performed by: NURSE PRACTITIONER

## 2024-02-28 PROCEDURE — G8417 CALC BMI ABV UP PARAM F/U: HCPCS | Performed by: NURSE PRACTITIONER

## 2024-02-28 PROCEDURE — G8400 PT W/DXA NO RESULTS DOC: HCPCS | Performed by: NURSE PRACTITIONER

## 2024-02-28 NOTE — PATIENT INSTRUCTIONS
We are committed to providing you the best care possible.    If you receive a survey after visiting one of our offices, please take time to share your experience concerning your physician office visit.  These surveys are confidential and no health information about you is shared.    We are eager to improve for you and we are counting on your feedback to help make that happen.      **It is YOUR responsibilty to bring medication bottles and/or updated medication list to EACH APPOINTMENT. This will allow us to better serve you and all your healthcare needs**    Thank you for allowing us to care for you today!   We want to ensure we can follow your treatment plan and we strive to give you the best outcomes and experience possible.   If you ever have a life threatening emergency and call 911 - for an ambulance (EMS)   Our providers can only care for you at:   [facility] or [facility].   Even if you have someone take you or you drive yourself we can only care for you in a [facility] facility. Our providers are not setup at the other healthcare locations!     Please be informed that if you contact our office outside of normal business hours the physician on call cannot help with any scheduling or rescheduling issues, procedure instruction questions or any type of medication issue.    We advise you for any urgent/emergency that you go to the nearest emergency room!    PLEASE CALL OUR OFFICE DURING NORMAL BUSINESS HOURS    Monday - Friday   8 am to 5 pm    [place]: [phone]    [place]: [phone]    [place]:  [phone]

## 2024-02-28 NOTE — PROGRESS NOTES
attack  -Normal tilt table test.  Patient has not lost consciousness.  She reports having cold sweats, chest pain and shortness of breath. Recommend deep breathing exercises. Symptoms have improved since starting PT and anxiety medication.     Venous Reflux  -L>R bilateral edema.  Ultrasound of lower extremities showed significant reflux of right proximal SSV and left CFV. Recommend compression stockings and continue with Maxzide 37.5 -25 mg.         Patient seen, interviewed and examined. Testing was reviewed.      Patient is encouraged to exercise even a brisk walk for 30 minutes at least 3 to 4 times a week.  Lifestyle and risk factor modificatons discussed. Various goals are discussed and questions answered. Continue current medications. Appropriate prescriptions are addressed.  Questions answered and patient verbalizes understanding.     Call for any problems, questions, or concerns.    Pt is to follow up in 6 months for Cardiac management    Electronically signed by DENVER Shine CNP on 2/28/2024 at 11:49 AM

## 2024-03-04 ENCOUNTER — TELEPHONE (OUTPATIENT)
Dept: CARDIOLOGY CLINIC | Age: 69
End: 2024-03-04

## 2024-05-01 ENCOUNTER — HOSPITAL ENCOUNTER (EMERGENCY)
Age: 69
Discharge: HOME | End: 2024-05-01
Attending: EMERGENCY MEDICINE
Payer: MEDICARE

## 2024-05-01 ENCOUNTER — APPOINTMENT (OUTPATIENT)
Dept: GENERAL RADIOLOGY | Age: 69
End: 2024-05-01
Payer: MEDICARE

## 2024-05-01 VITALS
RESPIRATION RATE: 18 BRPM | HEART RATE: 70 BPM | DIASTOLIC BLOOD PRESSURE: 51 MMHG | OXYGEN SATURATION: 96 % | HEIGHT: 67 IN | SYSTOLIC BLOOD PRESSURE: 117 MMHG | WEIGHT: 195 LBS | BODY MASS INDEX: 30.61 KG/M2 | TEMPERATURE: 102.2 F

## 2024-05-01 DIAGNOSIS — U07.1 COVID: Primary | ICD-10-CM

## 2024-05-01 LAB
ALBUMIN SERPL-MCNC: 4.1 GM/DL (ref 3.4–5)
ALP BLD-CCNC: 99 IU/L (ref 40–128)
ALT SERPL-CCNC: 9 U/L (ref 10–40)
ANION GAP SERPL CALCULATED.3IONS-SCNC: 12 MMOL/L (ref 7–16)
AST SERPL-CCNC: 18 IU/L (ref 15–37)
BASOPHILS ABSOLUTE: 0 K/CU MM
BASOPHILS RELATIVE PERCENT: 0.4 % (ref 0–1)
BILIRUB SERPL-MCNC: 0.3 MG/DL (ref 0–1)
BUN SERPL-MCNC: 13 MG/DL (ref 6–23)
CALCIUM SERPL-MCNC: 8.8 MG/DL (ref 8.3–10.6)
CHLORIDE BLD-SCNC: 98 MMOL/L (ref 99–110)
CO2: 23 MMOL/L (ref 21–32)
CREAT SERPL-MCNC: 1.1 MG/DL (ref 0.6–1.1)
DIFFERENTIAL TYPE: ABNORMAL
EOSINOPHILS ABSOLUTE: 0 K/CU MM
EOSINOPHILS RELATIVE PERCENT: 0.6 % (ref 0–3)
GFR, ESTIMATED: 54 ML/MIN/1.73M2
GLUCOSE SERPL-MCNC: 92 MG/DL (ref 70–99)
HCT VFR BLD CALC: 36.5 % (ref 37–47)
HEMOGLOBIN: 12.2 GM/DL (ref 12.5–16)
IMMATURE NEUTROPHIL %: 0.2 % (ref 0–0.43)
INFLUENZA A ANTIGEN: NOT DETECTED
INFLUENZA B ANTIGEN: NOT DETECTED
LACTIC ACID, SEPSIS: 1.1 MMOL/L (ref 0.4–2)
LYMPHOCYTES ABSOLUTE: 0.6 K/CU MM
LYMPHOCYTES RELATIVE PERCENT: 13.1 % (ref 24–44)
MCH RBC QN AUTO: 30.3 PG (ref 27–31)
MCHC RBC AUTO-ENTMCNC: 33.4 % (ref 32–36)
MCV RBC AUTO: 90.6 FL (ref 78–100)
MONOCYTES ABSOLUTE: 0.4 K/CU MM
MONOCYTES RELATIVE PERCENT: 9.4 % (ref 0–4)
NEUTROPHILS ABSOLUTE: 3.6 K/CU MM
NEUTROPHILS RELATIVE PERCENT: 76.3 % (ref 36–66)
NUCLEATED RBC %: 0 %
PDW BLD-RTO: 14.2 % (ref 11.7–14.9)
PLATELET # BLD: 234 K/CU MM (ref 140–440)
PMV BLD AUTO: 9.5 FL (ref 7.5–11.1)
POTASSIUM SERPL-SCNC: 3.8 MMOL/L (ref 3.5–5.1)
PROCALCITONIN SERPL-MCNC: 0.07 NG/ML
RBC # BLD: 4.03 M/CU MM (ref 4.2–5.4)
SARS-COV-2 RDRP RESP QL NAA+PROBE: DETECTED
SODIUM BLD-SCNC: 133 MMOL/L (ref 135–145)
SOURCE: ABNORMAL
TOTAL IMMATURE NEUTOROPHIL: 0.01 K/CU MM
TOTAL NUCLEATED RBC: 0 K/CU MM
TOTAL PROTEIN: 7 GM/DL (ref 6.4–8.2)
WBC # BLD: 4.7 K/CU MM (ref 4–10.5)

## 2024-05-01 PROCEDURE — 87635 SARS-COV-2 COVID-19 AMP PRB: CPT

## 2024-05-01 PROCEDURE — 93005 ELECTROCARDIOGRAM TRACING: CPT | Performed by: EMERGENCY MEDICINE

## 2024-05-01 PROCEDURE — 87040 BLOOD CULTURE FOR BACTERIA: CPT

## 2024-05-01 PROCEDURE — 85025 COMPLETE CBC W/AUTO DIFF WBC: CPT

## 2024-05-01 PROCEDURE — 2580000003 HC RX 258: Performed by: EMERGENCY MEDICINE

## 2024-05-01 PROCEDURE — 80053 COMPREHEN METABOLIC PANEL: CPT

## 2024-05-01 PROCEDURE — 87502 INFLUENZA DNA AMP PROBE: CPT

## 2024-05-01 PROCEDURE — 84145 PROCALCITONIN (PCT): CPT

## 2024-05-01 PROCEDURE — 6370000000 HC RX 637 (ALT 250 FOR IP): Performed by: EMERGENCY MEDICINE

## 2024-05-01 PROCEDURE — 83605 ASSAY OF LACTIC ACID: CPT

## 2024-05-01 PROCEDURE — 71045 X-RAY EXAM CHEST 1 VIEW: CPT

## 2024-05-01 PROCEDURE — 99285 EMERGENCY DEPT VISIT HI MDM: CPT

## 2024-05-01 RX ORDER — 0.9 % SODIUM CHLORIDE 0.9 %
30 INTRAVENOUS SOLUTION INTRAVENOUS ONCE
Status: COMPLETED | OUTPATIENT
Start: 2024-05-01 | End: 2024-05-01

## 2024-05-01 RX ORDER — BENZONATATE 100 MG/1
100 CAPSULE ORAL 2 TIMES DAILY PRN
Qty: 20 CAPSULE | Refills: 0 | Status: SHIPPED | OUTPATIENT
Start: 2024-05-01 | End: 2024-05-08

## 2024-05-01 RX ORDER — ACETAMINOPHEN 325 MG/1
650 TABLET ORAL ONCE
Status: COMPLETED | OUTPATIENT
Start: 2024-05-01 | End: 2024-05-01

## 2024-05-01 RX ADMIN — SODIUM CHLORIDE 1000 ML: 9 INJECTION, SOLUTION INTRAVENOUS at 15:42

## 2024-05-01 RX ADMIN — ACETAMINOPHEN 650 MG: 325 TABLET ORAL at 15:43

## 2024-05-01 ASSESSMENT — PAIN - FUNCTIONAL ASSESSMENT: PAIN_FUNCTIONAL_ASSESSMENT: 0-10

## 2024-05-01 ASSESSMENT — PAIN SCALES - GENERAL: PAINLEVEL_OUTOF10: 0

## 2024-05-02 LAB
EKG ATRIAL RATE: 79 BPM
EKG DIAGNOSIS: NORMAL
EKG P AXIS: 47 DEGREES
EKG P-R INTERVAL: 110 MS
EKG Q-T INTERVAL: 382 MS
EKG QRS DURATION: 64 MS
EKG QTC CALCULATION (BAZETT): 438 MS
EKG R AXIS: 12 DEGREES
EKG T AXIS: -78 DEGREES
EKG VENTRICULAR RATE: 79 BPM

## 2024-05-02 PROCEDURE — 93010 ELECTROCARDIOGRAM REPORT: CPT | Performed by: INTERNAL MEDICINE

## 2024-05-02 NOTE — ED PROVIDER NOTES
Emergency Department Encounter    Patient: Toña Winchester  MRN: [id number]  : 1955  Date of Evaluation: 2024  ED Provider:  Zelda Javed DO    Triage Chief Complaint:   Fever (101.8 at home )    [place]:  Toña Winchester is a 68 y.o. female that presents ***    ROS - see HPI, below listed is current ROS at time of my eval:  *** systems reviewed and negative except as above.     Past Medical History:   Diagnosis Date    Anxiety     CAD (coronary artery disease)     Chest pain     Chronic back pain     Depression     Essential hypertension 2021    GERD (gastroesophageal reflux disease)     H/O cardiovascular stress test     12-No scintigraphic evidence of inducible myocardial ischemia. Global Left ventricular sytolic function normal. Rest EF 64%.  No ECG changes. Unremarkable pharmacological stress test. Normal Myocardial Perfusion Study. 3/30/11- EF70% Normal Myocardial Perfusion study. 2010 EF =>55%;2009 EF 65%, 2008, 2007, 2006, 2006 EF70%    H/O Doppler ultrasound 2008     Carotid Report- No Significant atherosclerotic plaque noted in the right internal carotid artery.  The Doppler flow velocities w/in FREDERIC are within normal limits.  There is intimal thinckening but no significant atherosclerotic plaque noted in LICA. The Dopple flow velocities w/in LICA are Within Normal Limits.    H/O echocardiogram     12- EF=>55%. Left ventricular systolic function is normal.2011 EF =>55%, 2010 EF55%;  2008    H/O echocardiogram 2017    EF >55%    H/O exercise stress test 2018    treadmill    H/O percutaneous left heart catheterization 2014    EF 55% Left anterior descending artery has patent stent, proximal LAD has eccentric lesion of 30-40% unchanged from last time, The right coronary artery is a dominant vessel with abberant take off, has 40-50% mid RCA stenosis, proximal stent is patent. No evidence of hemodynamically significant coronary artery

## 2024-05-05 LAB
CULTURE: NORMAL
CULTURE: NORMAL
Lab: NORMAL
Lab: NORMAL
SPECIMEN: NORMAL
SPECIMEN: NORMAL

## 2024-05-06 LAB
CULTURE: NORMAL
CULTURE: NORMAL
Lab: NORMAL
Lab: NORMAL
SPECIMEN: NORMAL
SPECIMEN: NORMAL

## 2024-09-12 ENCOUNTER — OFFICE VISIT (OUTPATIENT)
Dept: CARDIOLOGY CLINIC | Age: 69
End: 2024-09-12
Payer: MEDICARE

## 2024-09-12 VITALS
BODY MASS INDEX: 27.78 KG/M2 | HEART RATE: 64 BPM | SYSTOLIC BLOOD PRESSURE: 128 MMHG | HEIGHT: 67 IN | DIASTOLIC BLOOD PRESSURE: 60 MMHG | WEIGHT: 177 LBS

## 2024-09-12 DIAGNOSIS — I25.10 CORONARY ARTERY DISEASE INVOLVING NATIVE CORONARY ARTERY OF NATIVE HEART WITHOUT ANGINA PECTORIS: ICD-10-CM

## 2024-09-12 DIAGNOSIS — E78.2 MIXED HYPERLIPIDEMIA: ICD-10-CM

## 2024-09-12 DIAGNOSIS — R55 SYNCOPE, UNSPECIFIED SYNCOPE TYPE: ICD-10-CM

## 2024-09-12 DIAGNOSIS — I35.1 NONRHEUMATIC AORTIC VALVE INSUFFICIENCY: ICD-10-CM

## 2024-09-12 DIAGNOSIS — I10 ESSENTIAL HYPERTENSION: Primary | ICD-10-CM

## 2024-09-12 DIAGNOSIS — R00.2 HEART PALPITATIONS: ICD-10-CM

## 2024-09-12 DIAGNOSIS — R55 VASOVAGAL ATTACK: ICD-10-CM

## 2024-09-12 DIAGNOSIS — R60.0 LEG EDEMA: ICD-10-CM

## 2024-09-12 PROCEDURE — 3074F SYST BP LT 130 MM HG: CPT | Performed by: INTERNAL MEDICINE

## 2024-09-12 PROCEDURE — 1036F TOBACCO NON-USER: CPT | Performed by: INTERNAL MEDICINE

## 2024-09-12 PROCEDURE — 3017F COLORECTAL CA SCREEN DOC REV: CPT | Performed by: INTERNAL MEDICINE

## 2024-09-12 PROCEDURE — 1090F PRES/ABSN URINE INCON ASSESS: CPT | Performed by: INTERNAL MEDICINE

## 2024-09-12 PROCEDURE — G8400 PT W/DXA NO RESULTS DOC: HCPCS | Performed by: INTERNAL MEDICINE

## 2024-09-12 PROCEDURE — G8417 CALC BMI ABV UP PARAM F/U: HCPCS | Performed by: INTERNAL MEDICINE

## 2024-09-12 PROCEDURE — G8427 DOCREV CUR MEDS BY ELIG CLIN: HCPCS | Performed by: INTERNAL MEDICINE

## 2024-09-12 PROCEDURE — 99214 OFFICE O/P EST MOD 30 MIN: CPT | Performed by: INTERNAL MEDICINE

## 2024-09-12 PROCEDURE — 1123F ACP DISCUSS/DSCN MKR DOCD: CPT | Performed by: INTERNAL MEDICINE

## 2024-09-12 PROCEDURE — 3078F DIAST BP <80 MM HG: CPT | Performed by: INTERNAL MEDICINE

## 2024-09-12 RX ORDER — AMLODIPINE BESYLATE 5 MG/1
5 TABLET ORAL DAILY
COMMUNITY

## 2025-03-27 ENCOUNTER — OFFICE VISIT (OUTPATIENT)
Dept: CARDIOLOGY CLINIC | Age: 70
End: 2025-03-27
Payer: MEDICARE

## 2025-03-27 VITALS
SYSTOLIC BLOOD PRESSURE: 124 MMHG | HEART RATE: 68 BPM | BODY MASS INDEX: 26.4 KG/M2 | DIASTOLIC BLOOD PRESSURE: 60 MMHG | HEIGHT: 67 IN | WEIGHT: 168.2 LBS

## 2025-03-27 DIAGNOSIS — I10 ESSENTIAL HYPERTENSION: ICD-10-CM

## 2025-03-27 DIAGNOSIS — R55 VASOVAGAL ATTACK: ICD-10-CM

## 2025-03-27 DIAGNOSIS — I25.10 CORONARY ARTERY DISEASE INVOLVING NATIVE CORONARY ARTERY OF NATIVE HEART WITHOUT ANGINA PECTORIS: Primary | ICD-10-CM

## 2025-03-27 DIAGNOSIS — E78.2 MIXED HYPERLIPIDEMIA: ICD-10-CM

## 2025-03-27 DIAGNOSIS — I87.2 VENOUS REFLUX: ICD-10-CM

## 2025-03-27 PROCEDURE — G8400 PT W/DXA NO RESULTS DOC: HCPCS | Performed by: NURSE PRACTITIONER

## 2025-03-27 PROCEDURE — G8427 DOCREV CUR MEDS BY ELIG CLIN: HCPCS | Performed by: NURSE PRACTITIONER

## 2025-03-27 PROCEDURE — 1159F MED LIST DOCD IN RCRD: CPT | Performed by: NURSE PRACTITIONER

## 2025-03-27 PROCEDURE — 1123F ACP DISCUSS/DSCN MKR DOCD: CPT | Performed by: NURSE PRACTITIONER

## 2025-03-27 PROCEDURE — 1036F TOBACCO NON-USER: CPT | Performed by: NURSE PRACTITIONER

## 2025-03-27 PROCEDURE — 3078F DIAST BP <80 MM HG: CPT | Performed by: NURSE PRACTITIONER

## 2025-03-27 PROCEDURE — 1090F PRES/ABSN URINE INCON ASSESS: CPT | Performed by: NURSE PRACTITIONER

## 2025-03-27 PROCEDURE — 3017F COLORECTAL CA SCREEN DOC REV: CPT | Performed by: NURSE PRACTITIONER

## 2025-03-27 PROCEDURE — 3074F SYST BP LT 130 MM HG: CPT | Performed by: NURSE PRACTITIONER

## 2025-03-27 PROCEDURE — G8417 CALC BMI ABV UP PARAM F/U: HCPCS | Performed by: NURSE PRACTITIONER

## 2025-03-27 PROCEDURE — 99214 OFFICE O/P EST MOD 30 MIN: CPT | Performed by: NURSE PRACTITIONER

## 2025-03-27 RX ORDER — PANTOPRAZOLE SODIUM 40 MG/1
TABLET, DELAYED RELEASE ORAL
COMMUNITY
Start: 2025-03-18

## 2025-03-27 NOTE — PROGRESS NOTES
[place] [address]  Phone: [phone]    Fax [phone]                  Jojo Torres MD, [facility]       Jaziel Lloyd MD, [facility]  Gabino Lima MD, [facility]    MD Moy Kendrick MD Tariq Rizvi, MD Melissa Kellis, DENVER Qureshi, DENVER Clifford, DENVER Rodriguez, APRN        Cardiology Progress Note      3/27/2025    RE: Toña Winchester  (1955)                             Primary cardiologist: Dr. Dillon Reed       Subjective:  CC:   1. Coronary artery disease involving native coronary artery of native heart without angina pectoris    2. Essential hypertension    3. Mixed hyperlipidemia    4. Venous reflux    5. Vasovagal attack            HPI: Toña Winchester, who is a  69 y.o. year old female with a past medical history as listed below.  Patient presents to the office for follow up on CAD (PCI of LAD in 2012 and RCA in 2013), HTN, vasovagal attacks, venous reflux and hyperlipidemia.  Patient wore Holter monitor in the past which did not show any significant abnormalities but noted to have symptoms associated with sinus bradycardia.  Patient has vasovagal attacks.  Stress test, tilt table, and echo normal.  Last syncopal episode occurred while at rest in Feburary of 2025, found to be secondary to dehydration.     Past Medical History:   Diagnosis Date    Anxiety     CAD (coronary artery disease)     Chest pain     Chronic back pain     Depression     Essential hypertension 04/02/2021    GERD (gastroesophageal reflux disease)     H/O cardiovascular stress test     7/26/12-No scintigraphic evidence of inducible myocardial ischemia. Global Left ventricular sytolic function normal. Rest EF 64%.  No ECG changes. Unremarkable pharmacological stress test. Normal Myocardial Perfusion Study. 3/30/11- EF70% Normal Myocardial Perfusion study. 2/18/2010 EF =>55%;1/2009 EF 65%, 1/2008, 1/2007, 5/2006, 2/2006 EF70%

## 2025-03-27 NOTE — PROGRESS NOTES
CLINICAL STAFF DOCUMENTATION    Rey Rodriguez, FÉLIX     Toña Winchester  1955  [id number]    Have you had any Chest Pain recently? - No  Have you had any Shortness of Breath - No  Have you had any dizziness -  Pt has history of vertigo  Have you had any palpitations recently? - No  Do you have any edema - swelling in  Pt has a health complication that she is medicated for. Her body circulation doesn't circulate well.     When did you have your last labs drawn 5/1/24  What doctor ordered Chambers  Do we have the labs in their chart Yes  Do you have a surgery or procedure scheduled in the near future - No  Do use tobacco products? - No  Do you drink alcohol? - No  Do you use any illicit drugs? - Yes edibles occasionally  Caffeine? - No  Check medication list thoroughly!!! AND RECONCILE OUTSIDE MEDICATIONS  If dose has changed change the entire order not just the MG  BE SURE TO ASK PATIENT IF THEY NEED MEDICATION REFILLS  Verify Pharmacy and update if incorrect    Add to every patient's \"wrap up\" the following dot phrase AFTERVISITCARDIOHEARTHOUSE and ensure we explain this to our patients

## 2025-04-11 ENCOUNTER — TELEPHONE (OUTPATIENT)
Dept: CARDIOLOGY CLINIC | Age: 70
End: 2025-04-11

## 2025-04-11 NOTE — TELEPHONE ENCOUNTER
LVM for office to call us back / Calling patients PCP to have most recent lipid within the last year faxed to our office.       ----- Message from DENVER Shannon CNP sent at 3/27/2025  3:19 PM EDT -----  Regarding: labs  Please see if patient had lipid panel in the last year from PCP. If not please order panel.

## (undated) DEVICE — SYRINGE, LUER LOCK, 3ML: Brand: MEDLINE

## (undated) DEVICE — GLOVE ORANGE PI 7 1/2   MSG9075

## (undated) DEVICE — CHLORAPREP 26ML ORANGE

## (undated) DEVICE — TOWEL,OR,DSP,ST,BLUE,STD,6/PK,12PK/CS: Brand: MEDLINE

## (undated) DEVICE — SUTURE VCRL SZ 3-0 L27IN ABSRB UD L26MM SH 1/2 CIR J416H

## (undated) DEVICE — Z INACTIVE USE 2863041 SPONGE GZ W4XL4IN 100% COT 16 PLY RADPQ HIGHLY ABSRB

## (undated) DEVICE — SPONGE LAP W18XL18IN WHT COT 4 PLY FLD STRUNG RADPQ DISP ST 2 PER PACK

## (undated) DEVICE — YANKAUER,FLEXIBLE HANDLE,REGLR CAPACITY: Brand: MEDLINE INDUSTRIES, INC.

## (undated) DEVICE — NEEDLE HYPO 23GA L1.5IN TURQ POLYPR HUB S STL THN WALL IM

## (undated) DEVICE — SHEET, T, LAPAROTOMY, STERILE: Brand: MEDLINE

## (undated) DEVICE — GLOVE SURG SZ 6 THK91MIL LTX FREE SYN POLYISOPRENE ANTI

## (undated) DEVICE — PACK,BASIC,SIRUS,V: Brand: MEDLINE

## (undated) DEVICE — GLOVE ORANGE PI 7   MSG9070

## (undated) DEVICE — COUNTER NDL 30 COUNT FOAM STRP SGL MAG

## (undated) DEVICE — SHEET,DRAPE,53X77,STERILE: Brand: MEDLINE

## (undated) DEVICE — MARKER SURG SKIN UTIL REGULAR/FINE 2 TIP W/ RUL AND 9 LBL

## (undated) DEVICE — GOWN,ECLIPSE,POLYRNF,BRTHSLV,L,30/CS: Brand: MEDLINE

## (undated) DEVICE — TUBING, SUCTION, 9/32" X 10', STRAIGHT: Brand: MEDLINE

## (undated) DEVICE — SYRINGE MED 10ML LUERLOCK TIP W/O SFTY DISP

## (undated) DEVICE — ADHESIVE SKIN CLSR 0.7ML TOP DERMBND ADV

## (undated) DEVICE — GOWN,SIRUS,POLYRNF,BRTHSLV,XLN/XL,20/CS: Brand: MEDLINE

## (undated) DEVICE — PENCIL ES CRD L10FT HND SWCHING ROCK SWCH W/ EDGE COAT BLDE

## (undated) DEVICE — INTENDED FOR TISSUE SEPARATION, AND OTHER PROCEDURES THAT REQUIRE A SHARP SURGICAL BLADE TO PUNCTURE OR CUT.: Brand: BARD-PARKER ® STAINLESS STEEL BLADES